# Patient Record
Sex: FEMALE | Race: WHITE | Employment: OTHER | URBAN - METROPOLITAN AREA
[De-identification: names, ages, dates, MRNs, and addresses within clinical notes are randomized per-mention and may not be internally consistent; named-entity substitution may affect disease eponyms.]

---

## 2019-03-18 ENCOUNTER — HOSPITAL ENCOUNTER (EMERGENCY)
Age: 69
Discharge: HOME | End: 2019-03-19
Attending: EMERGENCY MEDICINE | Admitting: EMERGENCY MEDICINE
Payer: COMMERCIAL

## 2019-03-18 DIAGNOSIS — M79.18 PAIN OF LEFT DELTOID: Primary | ICD-10-CM

## 2019-03-18 DIAGNOSIS — M79.10 MYALGIA: ICD-10-CM

## 2019-03-18 PROCEDURE — 99284 EMERGENCY DEPT VISIT MOD MDM: CPT | Performed by: EMERGENCY MEDICINE

## 2019-03-18 PROCEDURE — 74011636637 HC RX REV CODE- 636/637: Performed by: EMERGENCY MEDICINE

## 2019-03-18 PROCEDURE — 74011250637 HC RX REV CODE- 250/637: Performed by: EMERGENCY MEDICINE

## 2019-03-18 RX ORDER — PREDNISONE 20 MG/1
40 TABLET ORAL
Status: COMPLETED | OUTPATIENT
Start: 2019-03-18 | End: 2019-03-18

## 2019-03-18 RX ORDER — METHYLPREDNISOLONE 4 MG/1
4 TABLET ORAL
Qty: 1 DOSE PACK | Refills: 0 | Status: SHIPPED | OUTPATIENT
Start: 2019-03-18 | End: 2019-04-23

## 2019-03-18 RX ORDER — METOPROLOL SUCCINATE 25 MG/1
25 TABLET, EXTENDED RELEASE ORAL DAILY
COMMUNITY

## 2019-03-18 RX ORDER — HYDROCODONE BITARTRATE AND ACETAMINOPHEN 7.5; 325 MG/1; MG/1
1 TABLET ORAL
Status: COMPLETED | OUTPATIENT
Start: 2019-03-18 | End: 2019-03-18

## 2019-03-18 RX ORDER — IBUPROFEN 800 MG/1
800 TABLET ORAL
Status: COMPLETED | OUTPATIENT
Start: 2019-03-18 | End: 2019-03-18

## 2019-03-18 RX ADMIN — IBUPROFEN 800 MG: 800 TABLET, FILM COATED ORAL at 23:14

## 2019-03-18 RX ADMIN — PREDNISONE 40 MG: 20 TABLET ORAL at 23:14

## 2019-03-18 RX ADMIN — HYDROCODONE BITARTRATE AND ACETAMINOPHEN 1 TABLET: 7.5; 325 TABLET ORAL at 23:14

## 2019-03-19 VITALS
HEIGHT: 68 IN | DIASTOLIC BLOOD PRESSURE: 64 MMHG | WEIGHT: 155 LBS | SYSTOLIC BLOOD PRESSURE: 135 MMHG | TEMPERATURE: 98.5 F | OXYGEN SATURATION: 99 % | HEART RATE: 90 BPM | RESPIRATION RATE: 16 BRPM | BODY MASS INDEX: 23.49 KG/M2

## 2019-03-19 NOTE — ED NOTES
I have reviewed discharge instructions with the patient. The patient verbalized understanding. Patient left ED via Discharge Method: ambulatory to Home with daughter. Opportunity for questions and clarification provided. Patient given 1 scripts. To continue your aftercare when you leave the hospital, you may receive an automated call from our care team to check in on how you are doing. This is a free service and part of our promise to provide the best care and service to meet your aftercare needs.  If you have questions, or wish to unsubscribe from this service please call [phone]. Thank you for Choosing our [facility] Emergency Department.

## 2019-03-19 NOTE — DISCHARGE INSTRUCTIONS
1) Use sling for comfort, begin early range of motion exercises as we discussed as soon as possible  2) Use ibuprofen and tylenol for pain  3) Take medrol dose radha for inflammation  4) Take zyrtec as well for non-sedating antihistamine

## 2019-04-23 ENCOUNTER — APPOINTMENT (OUTPATIENT)
Dept: GENERAL RADIOLOGY | Age: 69
End: 2019-04-23
Attending: EMERGENCY MEDICINE
Payer: COMMERCIAL

## 2019-04-23 ENCOUNTER — HOSPITAL ENCOUNTER (EMERGENCY)
Age: 69
Discharge: HOME | End: 2019-04-23
Attending: EMERGENCY MEDICINE
Payer: COMMERCIAL

## 2019-04-23 VITALS
TEMPERATURE: 98.3 F | BODY MASS INDEX: 23.49 KG/M2 | WEIGHT: 155 LBS | HEIGHT: 68 IN | RESPIRATION RATE: 16 BRPM | DIASTOLIC BLOOD PRESSURE: 66 MMHG | OXYGEN SATURATION: 100 % | SYSTOLIC BLOOD PRESSURE: 139 MMHG | HEART RATE: 71 BPM

## 2019-04-23 DIAGNOSIS — F41.1 ANXIETY STATE: Primary | ICD-10-CM

## 2019-04-23 LAB
ALBUMIN SERPL-MCNC: 4 G/DL (ref 3.2–4.6)
ALBUMIN/GLOB SERPL: 1.2 {RATIO}
ALP SERPL-CCNC: 96 U/L (ref 50–130)
ALT SERPL-CCNC: 28 U/L (ref 12–65)
ANION GAP SERPL CALC-SCNC: 8 MMOL/L
AST SERPL-CCNC: 25 U/L (ref 15–37)
ATRIAL RATE: 92 BPM
BASOPHILS # BLD: 0 K/UL (ref 0–0.2)
BASOPHILS NFR BLD: 1 % (ref 0–2)
BILIRUB SERPL-MCNC: 0.6 MG/DL (ref 0.2–1.1)
BUN SERPL-MCNC: 15 MG/DL (ref 8–23)
CALCIUM SERPL-MCNC: 9.8 MG/DL (ref 8.3–10.4)
CALCULATED P AXIS, ECG09: 50 DEGREES
CALCULATED R AXIS, ECG10: 67 DEGREES
CALCULATED T AXIS, ECG11: 41 DEGREES
CHLORIDE SERPL-SCNC: 105 MMOL/L (ref 98–107)
CO2 SERPL-SCNC: 27 MMOL/L (ref 21–32)
CREAT SERPL-MCNC: 1.07 MG/DL (ref 0.6–1)
DIAGNOSIS, 93000: NORMAL
DIFFERENTIAL METHOD BLD: NORMAL
EOSINOPHIL # BLD: 0.2 K/UL (ref 0–0.8)
EOSINOPHIL NFR BLD: 3 % (ref 0.5–7.8)
ERYTHROCYTE [DISTWIDTH] IN BLOOD BY AUTOMATED COUNT: 12.4 % (ref 11.9–14.6)
GLOBULIN SER CALC-MCNC: 3.3 G/DL (ref 2.3–3.5)
GLUCOSE SERPL-MCNC: 133 MG/DL (ref 65–100)
HCT VFR BLD AUTO: 41.5 % (ref 35.8–46.3)
HGB BLD-MCNC: 13.6 G/DL (ref 11.7–15.4)
IMM GRANULOCYTES # BLD AUTO: 0 K/UL (ref 0–0.5)
IMM GRANULOCYTES NFR BLD AUTO: 0 % (ref 0–5)
LYMPHOCYTES # BLD: 1.7 K/UL (ref 0.5–4.6)
LYMPHOCYTES NFR BLD: 32 % (ref 13–44)
MCH RBC QN AUTO: 28.6 PG (ref 26.1–32.9)
MCHC RBC AUTO-ENTMCNC: 32.8 G/DL (ref 31.4–35)
MCV RBC AUTO: 87.4 FL (ref 79.6–97.8)
MONOCYTES # BLD: 0.4 K/UL (ref 0.1–1.3)
MONOCYTES NFR BLD: 8 % (ref 4–12)
NEUTS SEG # BLD: 3.1 K/UL (ref 1.7–8.2)
NEUTS SEG NFR BLD: 56 % (ref 43–78)
NRBC # BLD: 0 K/UL (ref 0–0.2)
P-R INTERVAL, ECG05: 152 MS
PLATELET # BLD AUTO: 226 K/UL (ref 150–450)
PMV BLD AUTO: 10.1 FL (ref 9.4–12.3)
POTASSIUM SERPL-SCNC: 3.3 MMOL/L (ref 3.5–5.1)
PROT SERPL-MCNC: 7.3 G/DL
Q-T INTERVAL, ECG07: 358 MS
QRS DURATION, ECG06: 88 MS
QTC CALCULATION (BEZET), ECG08: 442 MS
RBC # BLD AUTO: 4.75 M/UL (ref 4.05–5.2)
SODIUM SERPL-SCNC: 140 MMOL/L (ref 136–145)
TROPONIN I BLD-MCNC: 0 NG/ML (ref 0.02–0.05)
VENTRICULAR RATE, ECG03: 92 BPM
WBC # BLD AUTO: 5.4 K/UL (ref 4.3–11.1)

## 2019-04-23 PROCEDURE — 93005 ELECTROCARDIOGRAM TRACING: CPT | Performed by: EMERGENCY MEDICINE

## 2019-04-23 PROCEDURE — 99284 EMERGENCY DEPT VISIT MOD MDM: CPT | Performed by: EMERGENCY MEDICINE

## 2019-04-23 PROCEDURE — 74011250636 HC RX REV CODE- 250/636: Performed by: EMERGENCY MEDICINE

## 2019-04-23 PROCEDURE — 85025 COMPLETE CBC W/AUTO DIFF WBC: CPT

## 2019-04-23 PROCEDURE — 96374 THER/PROPH/DIAG INJ IV PUSH: CPT | Performed by: EMERGENCY MEDICINE

## 2019-04-23 PROCEDURE — 84484 ASSAY OF TROPONIN QUANT: CPT

## 2019-04-23 PROCEDURE — 80053 COMPREHEN METABOLIC PANEL: CPT

## 2019-04-23 PROCEDURE — 71045 X-RAY EXAM CHEST 1 VIEW: CPT

## 2019-04-23 RX ORDER — ONDANSETRON 2 MG/ML
4 INJECTION INTRAMUSCULAR; INTRAVENOUS
Status: COMPLETED | OUTPATIENT
Start: 2019-04-23 | End: 2019-04-23

## 2019-04-23 RX ADMIN — ONDANSETRON 4 MG: 2 INJECTION INTRAMUSCULAR; INTRAVENOUS at 21:40

## 2019-04-24 NOTE — ED PROVIDER NOTES
Patient is a 65-year-old female with a history of anxiety who was cleaning in her kitchen around 5 PM tonight and she had the acute onset of some nausea, some shortness of breath, and felt uneasy and anxious. She also had a little fluttering. There was no syncope. No chest pain. She feels much better now. She noticed her blood pressure was high and she checked it several more times and it was persistently elevated so she got nervous. The history is provided by the patient. Shortness of Breath This is a new problem. The current episode started 3 to 5 hours ago. The problem has been resolved. Pertinent negatives include no fever, no headaches, no rhinorrhea, no sore throat, no neck pain, no cough, no wheezing, no chest pain, no syncope, no vomiting, no abdominal pain, no rash, no leg pain and no leg swelling. She has tried nothing for the symptoms. Associated medical issues do not include asthma, COPD, CAD or past MI. Past Medical History:  
Diagnosis Date  Ill-defined condition History reviewed. No pertinent surgical history. History reviewed. No pertinent family history. Social History Socioeconomic History  Marital status:  Spouse name: Not on file  Number of children: Not on file  Years of education: Not on file  Highest education level: Not on file Occupational History  Not on file Social Needs  Financial resource strain: Not on file  Food insecurity:  
  Worry: Not on file Inability: Not on file  Transportation needs:  
  Medical: Not on file Non-medical: Not on file Tobacco Use  Smoking status: Not on file Substance and Sexual Activity  Alcohol use: Not on file  Drug use: Not on file  Sexual activity: Not on file Lifestyle  Physical activity:  
  Days per week: Not on file Minutes per session: Not on file  Stress: Not on file Relationships  Social connections: Talks on phone: Not on file Gets together: Not on file Attends Sikhism service: Not on file Active member of club or organization: Not on file Attends meetings of clubs or organizations: Not on file Relationship status: Not on file  Intimate partner violence:  
  Fear of current or ex partner: Not on file Emotionally abused: Not on file Physically abused: Not on file Forced sexual activity: Not on file Other Topics Concern  Not on file Social History Narrative  Not on file ALLERGIES: Ciprofloxacin and Erythromycin Review of Systems Constitutional: Negative for chills, fatigue and fever. HENT: Negative for congestion, rhinorrhea and sore throat. Eyes: Negative for pain, discharge and visual disturbance. Respiratory: Positive for shortness of breath. Negative for cough and wheezing. Cardiovascular: Positive for palpitations. Negative for chest pain, leg swelling and syncope. Gastrointestinal: Positive for nausea. Negative for abdominal pain, diarrhea and vomiting. Endocrine: Negative for polydipsia and polyuria. Genitourinary: Negative for dysuria, frequency and urgency. Musculoskeletal: Negative for back pain and neck pain. Skin: Negative for rash. Neurological: Negative for seizures, syncope, weakness and headaches. Hematological: Negative. Vitals:  
 04/23/19 1930 BP: 177/80 Pulse: (!) 101 Resp: 18 Temp: 98 °F (36.7 °C) SpO2: 100% Weight: 70.3 kg (155 lb) Height: 5' 8\" (1.727 m) Physical Exam  
Constitutional: She is oriented to person, place, and time. She appears well-developed and well-nourished. HENT:  
Head: Normocephalic and atraumatic. Eyes: Pupils are equal, round, and reactive to light. Conjunctivae and EOM are normal.  
Neck: Normal range of motion. Neck supple. Cardiovascular: Normal rate, regular rhythm and intact distal pulses. Pulmonary/Chest: Effort normal and breath sounds normal.  
Abdominal: Soft. There is no tenderness. There is no rebound and no guarding. Musculoskeletal: Normal range of motion. She exhibits no edema or deformity. Lymphadenopathy:  
  She has no cervical adenopathy. Neurological: She is alert and oriented to person, place, and time. She has normal strength. No cranial nerve deficit or sensory deficit. GCS eye subscore is 4. GCS verbal subscore is 5. GCS motor subscore is 6. Skin: Skin is warm and dry. No rash noted. Nursing note and vitals reviewed. MDM Number of Diagnoses or Management Options Diagnosis management comments: Blood pressures trended down and is now 732 systolic. EKG reviewed by me normal sinus rhythm rate of 92 left ventricular block with no acute ischemia Blood work unremarkable Troponin 0 Chest x-ray clear Evaluation here is all negative patient does have a history of anxiety in the past.  This was likely the culprit tonight. Advised follow-up with her doctor or return for any other acute concerns. Voice dictation software was used during the making of this note. This software is not perfect and grammatical and other typographical errors may be present. This note has been proofread, but may still contain errors. Vernon Armenta MD; 4/23/2019 @9:15 PM  
=================================================================== Amount and/or Complexity of Data Reviewed Clinical lab tests: ordered and reviewed Tests in the radiology section of CPT®: ordered and reviewed Review and summarize past medical records: yes Independent visualization of images, tracings, or specimens: yes Risk of Complications, Morbidity, and/or Mortality Presenting problems: low Management options: low Patient Progress Patient progress: stable Procedures

## 2019-04-24 NOTE — ED NOTES
I have reviewed discharge instructions with the patient. The patient verbalized understanding. Patient left ED via Discharge Method: ambulatory to Home with daughter. Opportunity for questions and clarification provided. Patient given 0 scripts. To continue your aftercare when you leave the hospital, you may receive an automated call from our care team to check in on how you are doing. This is a free service and part of our promise to provide the best care and service to meet your aftercare needs.  If you have questions, or wish to unsubscribe from this service please call [phone]. Thank you for Choosing our [facility] Emergency Department.

## 2019-05-30 NOTE — ED PROVIDER NOTES
Sebastián Ingram Setting is a 81 y.o. female seen on 3/18/2019 at 11:01 PM in the [facility] EMERGENCY DEPT in room FT12/12. Chief Complaint   Patient presents with    Arm Pain       HPI:  24-year-old female presented to the emergency department for evaluation of left shoulder pain following a pneumonia vaccination. Today around 10 AM at her primary care doctor's office she received pneumonia vaccine. She states that since receiving this and her left shoulder she has had increasing pain in her left shoulder. Pain is worse with any movement of her left arm. The pain does not radiate she describes it as a significant aching sensation it's worse right at this site where the shot was given. She is also complaining of some low grade fever and myalgias. No numbness or tingling or weakness in extremities    Historian: patient    REVIEW OF SYSTEMS     Review of Systems   Constitutional: Negative for fever. HENT: Negative. Eyes: Negative. Respiratory: Negative for cough, chest tightness, shortness of breath and wheezing. Cardiovascular: Negative for chest pain. Gastrointestinal: Negative for abdominal distention, abdominal pain, constipation, diarrhea and vomiting. Endocrine: Negative. Genitourinary: Negative for dysuria, flank pain, frequency and urgency. Musculoskeletal: Positive for arthralgias and myalgias. Neurological: Negative for dizziness, syncope and headaches. Psychiatric/Behavioral: Negative. All other systems reviewed and are negative. PAST MEDICAL HISTORY     Past Medical History:   Diagnosis Date    Ill-defined condition      History reviewed. No pertinent surgical history.   Social History     Socioeconomic History    Marital status:      Spouse name: Not on file    Number of children: Not on file    Years of education: Not on file    Highest education level: Not on file     Prior to Admission Medications Prescriptions Last Dose Informant Patient Reported? Taking?   metoprolol succinate (TOPROL-XL) 25 mg XL tablet   Yes Yes   Sig: Take 25 mg by mouth daily. Facility-Administered Medications: None     Allergies   Allergen Reactions    Ciprofloxacin Rash    Erythromycin Diarrhea        PHYSICAL EXAM       Vitals:    03/18/19 2144 03/18/19 2156 03/18/19 2227 03/18/19 2228   BP: 128/76 128/68  137/65   Pulse: 94  (!) 101 (!) 104   Resp: 18   18   Temp: 98.4 °F (36.9 °C)   100.3 °F (37.9 °C)   SpO2: 99%  95% 98%    Vital signs were reviewed. Physical Exam   Constitutional: She is oriented to person, place, and time. She appears well-developed and well-nourished. No distress. HENT:   Head: Normocephalic and atraumatic. Eyes: EOM are normal. Pupils are equal, round, and reactive to light. Neck: Normal range of motion. Neck supple. No midline tenderness, no step-offs or deformities, no meningismus   Cardiovascular: Normal rate, regular rhythm, normal heart sounds and intact distal pulses. Exam reveals no gallop and no friction rub. No murmur heard. Pulmonary/Chest: Effort normal and breath sounds normal. No stridor. No respiratory distress. She has no wheezes. Abdominal: Soft. Bowel sounds are normal. She exhibits no distension and no mass. There is no tenderness. There is no rebound and no guarding. Musculoskeletal: She exhibits tenderness. She exhibits no edema or deformity.    The left shoulder has exquisite tenderness to palpation over the deltoid muscle, significant discomfort with range of motion of the left shoulder, there is no tenderness over the biceps muscle and triceps, no tenderness at the elbow, full range of motion at the elbow and wrist without any discomfort, sensation is intact in all 3 distributions in the left hand, good capillary refill, 2+ radial and ulnar pulses are palpable, normal strength in all 3 distributions of the bautista, clavicles are nontender, AC joints are nontender, there is no overlying erythema, edema, or increased warmth over the shoulder   Neurological: She is alert and oriented to person, place, and time. No sensory deficit. No focal neuro deficits   Skin: Skin is warm and dry. No rash noted. She is not diaphoretic. No erythema. No erythema or increased warmth   Psychiatric: She has a normal mood and affect. Her behavior is normal.   Vitals reviewed. MEDICAL DECISION MAKING       ED Course: The patient has focal localized tenderness over the deltoid muscle at the site of the injection. I think this most likely represents a significant inflammatory response to the vaccination. No evidence of septic arthritis, cellulitis, abscess, etc. OVerall pt is well appearing, HDS. Given the degree of her discomfort, I will place a sling for short-term immobilization and give steroids for inflammatory control. I have recommended early range of motion exercises. She is comfortable with this plan and understands reasons to return to the emergency department. Disposition:  Dc to home  Diagnosis:  Deltoid pain    ____________________________________________________________________  A portion of this note was generated using voice recognition dictation software. While the note has been reviewed for accuracy, please note certain words and phrases may not be transcribed as intended that some grammatical and/or typographical errors may be present. Eye Protection Verbiage: Before proceeding with the stage, a plastic scleral shield was inserted. The globe was anesthetized with a few drops of 1% lidocaine with 1:100,000 epinephrine. Then, an appropriate sized scleral shield was chosen and coated with lacrilube ointment. The shield was gently inserted and left in place for the duration of each stage. After the stage was completed, the shield was gently removed.

## 2019-10-27 ENCOUNTER — HOSPITAL ENCOUNTER (EMERGENCY)
Age: 69
Discharge: HOME | End: 2019-10-27
Attending: EMERGENCY MEDICINE
Payer: COMMERCIAL

## 2019-10-27 ENCOUNTER — APPOINTMENT (OUTPATIENT)
Dept: GENERAL RADIOLOGY | Age: 69
End: 2019-10-27
Attending: EMERGENCY MEDICINE
Payer: COMMERCIAL

## 2019-10-27 VITALS
OXYGEN SATURATION: 96 % | BODY MASS INDEX: 22.13 KG/M2 | TEMPERATURE: 98.3 F | WEIGHT: 146 LBS | DIASTOLIC BLOOD PRESSURE: 67 MMHG | RESPIRATION RATE: 19 BRPM | HEIGHT: 68 IN | HEART RATE: 77 BPM | SYSTOLIC BLOOD PRESSURE: 101 MMHG

## 2019-10-27 DIAGNOSIS — R07.9 CHEST PAIN, UNSPECIFIED TYPE: Primary | ICD-10-CM

## 2019-10-27 LAB
ALBUMIN SERPL-MCNC: 4.1 G/DL (ref 3.2–4.6)
ALBUMIN/GLOB SERPL: 1.1 {RATIO} (ref 1.2–3.5)
ALP SERPL-CCNC: 75 U/L (ref 50–136)
ALT SERPL-CCNC: 20 U/L (ref 12–65)
ANION GAP SERPL CALC-SCNC: 9 MMOL/L (ref 7–16)
AST SERPL-CCNC: 24 U/L (ref 15–37)
BASOPHILS # BLD: 0 K/UL (ref 0–0.2)
BASOPHILS NFR BLD: 1 % (ref 0–2)
BILIRUB SERPL-MCNC: 0.8 MG/DL (ref 0.2–1.1)
BNP SERPL-MCNC: 25 PG/ML
BUN SERPL-MCNC: 15 MG/DL (ref 8–23)
CALCIUM SERPL-MCNC: 9.2 MG/DL (ref 8.3–10.4)
CHLORIDE SERPL-SCNC: 104 MMOL/L (ref 98–107)
CO2 SERPL-SCNC: 25 MMOL/L (ref 21–32)
CREAT SERPL-MCNC: 1.15 MG/DL (ref 0.6–1)
D DIMER PPP FEU-MCNC: <0.27 UG/ML(FEU)
DIFFERENTIAL METHOD BLD: ABNORMAL
EOSINOPHIL # BLD: 0 K/UL (ref 0–0.8)
EOSINOPHIL NFR BLD: 0 % (ref 0.5–7.8)
ERYTHROCYTE [DISTWIDTH] IN BLOOD BY AUTOMATED COUNT: 12.5 % (ref 11.9–14.6)
GLOBULIN SER CALC-MCNC: 3.8 G/DL (ref 2.3–3.5)
GLUCOSE SERPL-MCNC: 107 MG/DL (ref 65–100)
HCT VFR BLD AUTO: 41.5 % (ref 35.8–46.3)
HGB BLD-MCNC: 14.1 G/DL (ref 11.7–15.4)
IMM GRANULOCYTES # BLD AUTO: 0 K/UL (ref 0–0.5)
IMM GRANULOCYTES NFR BLD AUTO: 0 % (ref 0–5)
LYMPHOCYTES # BLD: 1.2 K/UL (ref 0.5–4.6)
LYMPHOCYTES NFR BLD: 14 % (ref 13–44)
MCH RBC QN AUTO: 29.6 PG (ref 26.1–32.9)
MCHC RBC AUTO-ENTMCNC: 34 G/DL (ref 31.4–35)
MCV RBC AUTO: 87 FL (ref 79.6–97.8)
MONOCYTES # BLD: 0.5 K/UL (ref 0.1–1.3)
MONOCYTES NFR BLD: 6 % (ref 4–12)
NEUTS SEG # BLD: 7 K/UL (ref 1.7–8.2)
NEUTS SEG NFR BLD: 80 % (ref 43–78)
NRBC # BLD: 0 K/UL (ref 0–0.2)
PLATELET # BLD AUTO: 272 K/UL (ref 150–450)
PMV BLD AUTO: 10.3 FL (ref 9.4–12.3)
POTASSIUM SERPL-SCNC: 3.2 MMOL/L (ref 3.5–5.1)
PROT SERPL-MCNC: 7.9 G/DL (ref 6.3–8.2)
RBC # BLD AUTO: 4.77 M/UL (ref 4.05–5.2)
SODIUM SERPL-SCNC: 138 MMOL/L (ref 136–145)
TROPONIN I BLD-MCNC: 0 NG/ML (ref 0.02–0.05)
TROPONIN I SERPL-MCNC: <0.02 NG/ML (ref 0.02–0.05)
WBC # BLD AUTO: 8.8 K/UL (ref 4.3–11.1)

## 2019-10-27 PROCEDURE — 84484 ASSAY OF TROPONIN QUANT: CPT

## 2019-10-27 PROCEDURE — 80053 COMPREHEN METABOLIC PANEL: CPT

## 2019-10-27 PROCEDURE — 83880 ASSAY OF NATRIURETIC PEPTIDE: CPT

## 2019-10-27 PROCEDURE — 85379 FIBRIN DEGRADATION QUANT: CPT

## 2019-10-27 PROCEDURE — 74011250637 HC RX REV CODE- 250/637: Performed by: EMERGENCY MEDICINE

## 2019-10-27 PROCEDURE — 99285 EMERGENCY DEPT VISIT HI MDM: CPT | Performed by: EMERGENCY MEDICINE

## 2019-10-27 PROCEDURE — 74011250636 HC RX REV CODE- 250/636: Performed by: EMERGENCY MEDICINE

## 2019-10-27 PROCEDURE — 93005 ELECTROCARDIOGRAM TRACING: CPT | Performed by: EMERGENCY MEDICINE

## 2019-10-27 PROCEDURE — 74011000250 HC RX REV CODE- 250: Performed by: EMERGENCY MEDICINE

## 2019-10-27 PROCEDURE — 96375 TX/PRO/DX INJ NEW DRUG ADDON: CPT | Performed by: EMERGENCY MEDICINE

## 2019-10-27 PROCEDURE — 94640 AIRWAY INHALATION TREATMENT: CPT

## 2019-10-27 PROCEDURE — 71046 X-RAY EXAM CHEST 2 VIEWS: CPT

## 2019-10-27 PROCEDURE — 85025 COMPLETE CBC W/AUTO DIFF WBC: CPT

## 2019-10-27 PROCEDURE — 96374 THER/PROPH/DIAG INJ IV PUSH: CPT | Performed by: EMERGENCY MEDICINE

## 2019-10-27 RX ORDER — LORAZEPAM 2 MG/ML
0.5 INJECTION INTRAMUSCULAR
Status: COMPLETED | OUTPATIENT
Start: 2019-10-27 | End: 2019-10-27

## 2019-10-27 RX ORDER — GUAIFENESIN 100 MG/5ML
162 LIQUID (ML) ORAL
Status: COMPLETED | OUTPATIENT
Start: 2019-10-27 | End: 2019-10-27

## 2019-10-27 RX ORDER — IPRATROPIUM BROMIDE AND ALBUTEROL SULFATE 2.5; .5 MG/3ML; MG/3ML
3 SOLUTION RESPIRATORY (INHALATION)
Status: COMPLETED | OUTPATIENT
Start: 2019-10-27 | End: 2019-10-27

## 2019-10-27 RX ORDER — NITROGLYCERIN 0.4 MG/1
0.4 TABLET SUBLINGUAL
Status: DISCONTINUED | OUTPATIENT
Start: 2019-10-27 | End: 2019-10-27 | Stop reason: HOSPADM

## 2019-10-27 RX ORDER — METOPROLOL TARTRATE 5 MG/5ML
5 INJECTION INTRAVENOUS ONCE
Status: COMPLETED | OUTPATIENT
Start: 2019-10-27 | End: 2019-10-27

## 2019-10-27 RX ADMIN — METOPROLOL TARTRATE 5 MG: 5 INJECTION INTRAVENOUS at 19:11

## 2019-10-27 RX ADMIN — ASPIRIN 81 MG 162 MG: 81 TABLET ORAL at 18:32

## 2019-10-27 RX ADMIN — IPRATROPIUM BROMIDE AND ALBUTEROL SULFATE 3 ML: .5; 3 SOLUTION RESPIRATORY (INHALATION) at 18:15

## 2019-10-27 RX ADMIN — NITROGLYCERIN 0.4 MG: 0.4 TABLET SUBLINGUAL at 18:38

## 2019-10-27 RX ADMIN — LORAZEPAM 0.5 MG: 2 INJECTION INTRAMUSCULAR; INTRAVENOUS at 18:50

## 2019-10-27 NOTE — ED PROVIDER NOTES
73-year-old female presents with a one-week history of intermittent dyspnea and chest fullness. She sometimes feels that the pressure sensation in the center of her chest radiates up to the throat. It is not exertional or positional.  There is no concomitant nausea or diaphoresis. She sometimes feels a little dizzy but does not experience paresthesias of the face, feet, or hands. Symptoms come and go and may last 15 to 30 minutes at a time. Dyspnea is bad enough that she needs to stop doing what she is doing. No PE risk factors specifically no recent travel, surgeries, hormone replacement. No prior history of DVT or PE. Patient takes metoprolol for palpitations and is actually due for 1-1/2 of her 25 mg metoprolol XL's now. Past Medical History:   Diagnosis Date    Ill-defined condition        History reviewed. No pertinent surgical history. History reviewed. No pertinent family history.     Social History     Socioeconomic History    Marital status:      Spouse name: Not on file    Number of children: Not on file    Years of education: Not on file    Highest education level: Not on file   Occupational History    Not on file   Social Needs    Financial resource strain: Not on file    Food insecurity:     Worry: Not on file     Inability: Not on file    Transportation needs:     Medical: Not on file     Non-medical: Not on file   Tobacco Use    Smoking status: Not on file   Substance and Sexual Activity    Alcohol use: Not on file    Drug use: Not on file    Sexual activity: Not on file   Lifestyle    Physical activity:     Days per week: Not on file     Minutes per session: Not on file    Stress: Not on file   Relationships    Social connections:     Talks on phone: Not on file     Gets together: Not on file     Attends Faith service: Not on file     Active member of club or organization: Not on file     Attends meetings of clubs or organizations: Not on file Relationship status: Not on file    Intimate partner violence:     Fear of current or ex partner: Not on file     Emotionally abused: Not on file     Physically abused: Not on file     Forced sexual activity: Not on file   Other Topics Concern    Not on file   Social History Narrative    Not on file         ALLERGIES: Ciprofloxacin and Erythromycin    Review of Systems   Constitutional: Negative for chills and fever. HENT: Negative for rhinorrhea and sore throat. Eyes: Negative for discharge and redness. Respiratory: Positive for shortness of breath. Negative for cough. Cardiovascular: Positive for chest pain and palpitations. Negative for leg swelling. Gastrointestinal: Negative for abdominal pain, diarrhea, nausea and vomiting. Musculoskeletal: Negative for arthralgias and back pain. Skin: Negative for rash. Neurological: Negative for dizziness and headaches. All other systems reviewed and are negative. Vitals:    10/27/19 1614 10/27/19 1816   BP: 140/74    Pulse: 98    Resp: 16    Temp: 98.3 °F (36.8 °C)    SpO2: 100% 98%   Weight: 66.2 kg (146 lb)    Height: 5' 8\" (1.727 m)             Physical Exam   Constitutional: She is oriented to person, place, and time. She appears well-developed and well-nourished. No distress. HENT:   Head: Normocephalic and atraumatic. Eyes: Pupils are equal, round, and reactive to light. Conjunctivae are normal. Right eye exhibits no discharge. Left eye exhibits no discharge. No scleral icterus. Neck: Normal range of motion. Neck supple. Cardiovascular: Normal rate, regular rhythm and normal heart sounds. Exam reveals no gallop. No murmur heard. Pulmonary/Chest: Effort normal and breath sounds normal. No respiratory distress. She has no wheezes. She has no rales. Abdominal: Soft. Bowel sounds are normal. There is no tenderness. There is no guarding. Musculoskeletal: Normal range of motion. She exhibits no edema.    Neurological: She is alert and oriented to person, place, and time. She exhibits normal muscle tone. cni 2-12 grossly   Skin: Skin is warm and dry. She is not diaphoretic. Psychiatric: She has a normal mood and affect. Her behavior is normal.   Nursing note and vitals reviewed. MDM  Number of Diagnoses or Management Options  Diagnosis management comments: Medical decision making note:  53-year-old female with intermittent chest fullness and dyspnea. She shows ischemic changes with ST depression inferolaterally. No improvement with a DuoNeb which was ordered empirically for the dyspnea, fact patient now states she feels like her head is \"buzzing\". When EKG is repeated with consistent inferolateral ST depression. Troponin is negative, and d-dimer and BNP have been added on. Cardiology is asked to see the patient, in light of the EKG changes with intermittent chest fullness and dyspnea    6:45 PM apparently, RN went to give a single nitroglycerin, and 5 of Lopressor IV, and patient's heart rate spiked up from the 110s to the 140s. seems to be overtly anxious now. She states had trouble with anxiety in the past, and used to take Xanax intermittently. We will do a third EKG with the new tachycardia  feels better after 0.5mg iv ativan, and st depression persists. No relief with 1 ntg, s.l. Care turned over to dr bradley at 19:000 on bedside rounds with patient and , opportunities to ask questions provided. Cardiology consult, d-dimer, and bnp pending. If discharged should get a p.o.c. Troponin at 19:19    This concludes the \"medical decision making note\" part of this emergency department visit note. Tomas Bradley.   ALONDRA troponin negative. D-dimer and BNP are good. Cardiology saw EKGs and state no change from previous in April and okay to follow-up. Patient feeling better after Ativan here. Will discharge with follow-up.          Procedures

## 2019-10-27 NOTE — ED TRIAGE NOTES
Pt ambulatory to triage without complications. Pt reports intermittent SOB for few days. Pt states she has productive cough with clear sputum and states it feels like a \"lump\" in her throat. Pt reports hx of palpitations. Pt denies any further cardiac hx. Pt denies hx of COPD or asthma. Pt denies cp, jaw pain, neck pain, shoulder pain, or abd pain. Pt denies coughing up any blood. Pt denies taking anitcoags. Pt denies hx of PE or DVT or long car rides or plane rides lately.

## 2019-10-27 NOTE — ED NOTES
Pt explained the side effects of nitro. When pt started to experience the headache, pt became panicked. Pt states she is prone to anxiety attacks and worries about migraines. Pt HR gone up to 140 Dr. Milagros Woo informed, and meds given.

## 2019-10-28 LAB
ATRIAL RATE: 100 BPM
ATRIAL RATE: 106 BPM
ATRIAL RATE: 136 BPM
ATRIAL RATE: 98 BPM
CALCULATED P AXIS, ECG09: 74 DEGREES
CALCULATED P AXIS, ECG09: 77 DEGREES
CALCULATED P AXIS, ECG09: 79 DEGREES
CALCULATED P AXIS, ECG09: 80 DEGREES
CALCULATED R AXIS, ECG10: 72 DEGREES
CALCULATED R AXIS, ECG10: 73 DEGREES
CALCULATED R AXIS, ECG10: 76 DEGREES
CALCULATED R AXIS, ECG10: 78 DEGREES
CALCULATED T AXIS, ECG11: -55 DEGREES
CALCULATED T AXIS, ECG11: 25 DEGREES
CALCULATED T AXIS, ECG11: 41 DEGREES
CALCULATED T AXIS, ECG11: 51 DEGREES
DIAGNOSIS, 93000: NORMAL
P-R INTERVAL, ECG05: 146 MS
P-R INTERVAL, ECG05: 154 MS
P-R INTERVAL, ECG05: 158 MS
P-R INTERVAL, ECG05: 196 MS
Q-T INTERVAL, ECG07: 284 MS
Q-T INTERVAL, ECG07: 336 MS
Q-T INTERVAL, ECG07: 356 MS
Q-T INTERVAL, ECG07: 362 MS
QRS DURATION, ECG06: 76 MS
QRS DURATION, ECG06: 80 MS
QRS DURATION, ECG06: 82 MS
QRS DURATION, ECG06: 86 MS
QTC CALCULATION (BEZET), ECG08: 427 MS
QTC CALCULATION (BEZET), ECG08: 446 MS
QTC CALCULATION (BEZET), ECG08: 459 MS
QTC CALCULATION (BEZET), ECG08: 462 MS
VENTRICULAR RATE, ECG03: 100 BPM
VENTRICULAR RATE, ECG03: 106 BPM
VENTRICULAR RATE, ECG03: 136 BPM
VENTRICULAR RATE, ECG03: 98 BPM

## 2019-10-28 NOTE — DISCHARGE INSTRUCTIONS

## 2019-10-28 NOTE — ED NOTES
I have reviewed discharge instructions with the patient. The patient verbalized understanding. Patient left ED via Discharge Method: ambulatory to Home with family. Opportunity for questions and clarification provided. Patient given 0 scripts. To continue your aftercare when you leave the hospital, you may receive an automated call from our care team to check in on how you are doing. This is a free service and part of our promise to provide the best care and service to meet your aftercare needs.  If you have questions, or wish to unsubscribe from this service please call [phone]. Thank you for Choosing our [facility] Emergency Department.

## 2020-01-21 ENCOUNTER — HOSPITAL ENCOUNTER (OUTPATIENT)
Dept: MAMMOGRAPHY | Age: 70
Discharge: HOME | End: 2020-01-21
Attending: NURSE PRACTITIONER
Payer: COMMERCIAL

## 2020-01-21 DIAGNOSIS — Z12.31 VISIT FOR SCREENING MAMMOGRAM: ICD-10-CM

## 2020-01-21 PROCEDURE — 77067 SCR MAMMO BI INCL CAD: CPT

## 2020-03-15 ENCOUNTER — HOSPITAL ENCOUNTER (OUTPATIENT)
Age: 70
Setting detail: OBSERVATION
Discharge: HOME | End: 2020-03-16
Attending: EMERGENCY MEDICINE | Admitting: INTERNAL MEDICINE
Payer: COMMERCIAL

## 2020-03-15 ENCOUNTER — APPOINTMENT (OUTPATIENT)
Dept: CT IMAGING | Age: 70
End: 2020-03-15
Attending: EMERGENCY MEDICINE
Payer: COMMERCIAL

## 2020-03-15 DIAGNOSIS — R20.0 NUMBNESS: Primary | ICD-10-CM

## 2020-03-15 PROBLEM — R00.2 PALPITATIONS: Status: ACTIVE | Noted: 2020-03-15

## 2020-03-15 PROBLEM — F41.9 ANXIETY: Status: ACTIVE | Noted: 2020-03-15

## 2020-03-15 PROBLEM — I63.81 LEFT SIDED LACUNAR INFARCTION (HCC): Status: ACTIVE | Noted: 2020-03-15

## 2020-03-15 PROBLEM — G45.9 TIA (TRANSIENT ISCHEMIC ATTACK): Status: ACTIVE | Noted: 2020-03-15

## 2020-03-15 LAB
ALBUMIN SERPL-MCNC: 3.9 G/DL (ref 3.2–4.6)
ALBUMIN/GLOB SERPL: 1.1 {RATIO} (ref 1.2–3.5)
ALP SERPL-CCNC: 76 U/L (ref 50–130)
ALT SERPL-CCNC: 20 U/L (ref 12–65)
ANION GAP SERPL CALC-SCNC: 6 MMOL/L (ref 7–16)
AST SERPL-CCNC: 27 U/L (ref 15–37)
ATRIAL RATE: 92 BPM
BASOPHILS # BLD: 0.1 K/UL (ref 0–0.2)
BASOPHILS NFR BLD: 1 % (ref 0–2)
BILIRUB SERPL-MCNC: 0.8 MG/DL (ref 0.2–1.1)
BUN SERPL-MCNC: 14 MG/DL (ref 8–23)
CALCIUM SERPL-MCNC: 9.6 MG/DL (ref 8.3–10.4)
CALCULATED P AXIS, ECG09: 79 DEGREES
CALCULATED R AXIS, ECG10: 74 DEGREES
CALCULATED T AXIS, ECG11: 52 DEGREES
CHLORIDE SERPL-SCNC: 105 MMOL/L (ref 98–107)
CO2 SERPL-SCNC: 26 MMOL/L (ref 21–32)
CREAT SERPL-MCNC: 0.86 MG/DL (ref 0.6–1)
DIAGNOSIS, 93000: NORMAL
DIFFERENTIAL METHOD BLD: NORMAL
EOSINOPHIL # BLD: 0.1 K/UL (ref 0–0.8)
EOSINOPHIL NFR BLD: 2 % (ref 0.5–7.8)
ERYTHROCYTE [DISTWIDTH] IN BLOOD BY AUTOMATED COUNT: 12.3 % (ref 11.9–14.6)
GLOBULIN SER CALC-MCNC: 3.7 G/DL (ref 2.3–3.5)
GLUCOSE BLD STRIP.AUTO-MCNC: 98 MG/DL (ref 65–100)
GLUCOSE SERPL-MCNC: 97 MG/DL (ref 65–100)
HCT VFR BLD AUTO: 42.8 % (ref 35.8–46.3)
HGB BLD-MCNC: 14.2 G/DL (ref 11.7–15.4)
IMM GRANULOCYTES # BLD AUTO: 0 K/UL (ref 0–0.5)
IMM GRANULOCYTES NFR BLD AUTO: 0 % (ref 0–5)
INR PPP: 0.9
LYMPHOCYTES # BLD: 1.1 K/UL (ref 0.5–4.6)
LYMPHOCYTES NFR BLD: 18 % (ref 13–44)
MCH RBC QN AUTO: 28.5 PG (ref 26.1–32.9)
MCHC RBC AUTO-ENTMCNC: 33.2 G/DL (ref 31.4–35)
MCV RBC AUTO: 85.8 FL (ref 79.6–97.8)
MONOCYTES # BLD: 0.4 K/UL (ref 0.1–1.3)
MONOCYTES NFR BLD: 7 % (ref 4–12)
NEUTS SEG # BLD: 4.4 K/UL (ref 1.7–8.2)
NEUTS SEG NFR BLD: 72 % (ref 43–78)
NRBC # BLD: 0 K/UL (ref 0–0.2)
P-R INTERVAL, ECG05: 152 MS
PLATELET # BLD AUTO: 256 K/UL (ref 150–450)
PMV BLD AUTO: 10.3 FL (ref 9.4–12.3)
POTASSIUM SERPL-SCNC: 3.6 MMOL/L (ref 3.5–5.1)
PROT SERPL-MCNC: 7.6 G/DL (ref 6.3–8.2)
PROTHROMBIN TIME: 12.8 SEC (ref 12–14.7)
Q-T INTERVAL, ECG07: 360 MS
QRS DURATION, ECG06: 82 MS
QTC CALCULATION (BEZET), ECG08: 445 MS
RBC # BLD AUTO: 4.99 M/UL (ref 4.05–5.2)
SODIUM SERPL-SCNC: 137 MMOL/L (ref 136–145)
VENTRICULAR RATE, ECG03: 92 BPM
WBC # BLD AUTO: 6.1 K/UL (ref 4.3–11.1)

## 2020-03-15 PROCEDURE — 77030040361 HC SLV COMPR DVT MDII -B

## 2020-03-15 PROCEDURE — 80053 COMPREHEN METABOLIC PANEL: CPT

## 2020-03-15 PROCEDURE — 93005 ELECTROCARDIOGRAM TRACING: CPT | Performed by: EMERGENCY MEDICINE

## 2020-03-15 PROCEDURE — 70450 CT HEAD/BRAIN W/O DYE: CPT

## 2020-03-15 PROCEDURE — 99218 HC RM OBSERVATION: CPT

## 2020-03-15 PROCEDURE — 74011250637 HC RX REV CODE- 250/637: Performed by: FAMILY MEDICINE

## 2020-03-15 PROCEDURE — 85025 COMPLETE CBC W/AUTO DIFF WBC: CPT

## 2020-03-15 PROCEDURE — 82962 GLUCOSE BLOOD TEST: CPT

## 2020-03-15 PROCEDURE — 74011250637 HC RX REV CODE- 250/637: Performed by: EMERGENCY MEDICINE

## 2020-03-15 PROCEDURE — 85610 PROTHROMBIN TIME: CPT

## 2020-03-15 PROCEDURE — 99285 EMERGENCY DEPT VISIT HI MDM: CPT

## 2020-03-15 PROCEDURE — 74011250637 HC RX REV CODE- 250/637: Performed by: INTERNAL MEDICINE

## 2020-03-15 RX ORDER — GUAIFENESIN 100 MG/5ML
81 LIQUID (ML) ORAL DAILY
Status: DISCONTINUED | OUTPATIENT
Start: 2020-03-16 | End: 2020-03-16 | Stop reason: HOSPADM

## 2020-03-15 RX ORDER — ACETAMINOPHEN 325 MG/1
650 TABLET ORAL
Status: DISCONTINUED | OUTPATIENT
Start: 2020-03-15 | End: 2020-03-16 | Stop reason: HOSPADM

## 2020-03-15 RX ORDER — ONDANSETRON 2 MG/ML
4 INJECTION INTRAMUSCULAR; INTRAVENOUS
Status: DISCONTINUED | OUTPATIENT
Start: 2020-03-15 | End: 2020-03-16 | Stop reason: HOSPADM

## 2020-03-15 RX ORDER — METOPROLOL SUCCINATE 25 MG/1
37.5 TABLET, EXTENDED RELEASE ORAL DAILY
Status: DISCONTINUED | OUTPATIENT
Start: 2020-03-16 | End: 2020-03-15

## 2020-03-15 RX ORDER — ALPRAZOLAM 0.25 MG/1
0.25 TABLET ORAL AS NEEDED
COMMUNITY

## 2020-03-15 RX ORDER — SODIUM CHLORIDE 0.9 % (FLUSH) 0.9 %
5-40 SYRINGE (ML) INJECTION AS NEEDED
Status: DISCONTINUED | OUTPATIENT
Start: 2020-03-15 | End: 2020-03-16 | Stop reason: HOSPADM

## 2020-03-15 RX ORDER — METOPROLOL SUCCINATE 25 MG/1
37.5 TABLET, EXTENDED RELEASE ORAL DAILY
Status: DISCONTINUED | OUTPATIENT
Start: 2020-03-15 | End: 2020-03-16 | Stop reason: HOSPADM

## 2020-03-15 RX ORDER — GUAIFENESIN 100 MG/5ML
324 LIQUID (ML) ORAL
Status: COMPLETED | OUTPATIENT
Start: 2020-03-15 | End: 2020-03-15

## 2020-03-15 RX ORDER — SODIUM CHLORIDE 0.9 % (FLUSH) 0.9 %
5-40 SYRINGE (ML) INJECTION EVERY 8 HOURS
Status: DISCONTINUED | OUTPATIENT
Start: 2020-03-15 | End: 2020-03-16 | Stop reason: HOSPADM

## 2020-03-15 RX ORDER — ATORVASTATIN CALCIUM 40 MG/1
40 TABLET, FILM COATED ORAL
Status: DISCONTINUED | OUTPATIENT
Start: 2020-03-15 | End: 2020-03-16

## 2020-03-15 RX ORDER — ALPRAZOLAM 0.5 MG/1
0.25 TABLET ORAL
Status: DISCONTINUED | OUTPATIENT
Start: 2020-03-15 | End: 2020-03-16 | Stop reason: HOSPADM

## 2020-03-15 RX ADMIN — METOPROLOL SUCCINATE 37.5 MG: 25 TABLET, EXTENDED RELEASE ORAL at 20:13

## 2020-03-15 RX ADMIN — ASPIRIN 81 MG 324 MG: 81 TABLET ORAL at 17:00

## 2020-03-15 RX ADMIN — ALPRAZOLAM 0.25 MG: 0.5 TABLET ORAL at 21:31

## 2020-03-15 RX ADMIN — Medication 10 ML: at 21:36

## 2020-03-15 NOTE — ED PROVIDER NOTES
Patient is a 82-year-old female who presents with left-sided numbness. States her symptoms began around 8:00 this morning and numbness to her left arm and left face lasted a total of about 15 minutes before resolving on its own. States then developed symptoms again at 3:00 this afternoon. No weakness, no headaches, no nausea or vomiting, no fevers or chills, no further complaints. Past Medical History:   Diagnosis Date    Ill-defined condition        History reviewed. No pertinent surgical history. History reviewed. No pertinent family history.     Social History     Socioeconomic History    Marital status:      Spouse name: Not on file    Number of children: Not on file    Years of education: Not on file    Highest education level: Not on file   Occupational History    Not on file   Social Needs    Financial resource strain: Not on file    Food insecurity     Worry: Not on file     Inability: Not on file    Transportation needs     Medical: Not on file     Non-medical: Not on file   Tobacco Use    Smoking status: Not on file   Substance and Sexual Activity    Alcohol use: Not on file    Drug use: Not on file    Sexual activity: Not on file   Lifestyle    Physical activity     Days per week: Not on file     Minutes per session: Not on file    Stress: Not on file   Relationships    Social connections     Talks on phone: Not on file     Gets together: Not on file     Attends Hindu service: Not on file     Active member of club or organization: Not on file     Attends meetings of clubs or organizations: Not on file     Relationship status: Not on file    Intimate partner violence     Fear of current or ex partner: Not on file     Emotionally abused: Not on file     Physically abused: Not on file     Forced sexual activity: Not on file   Other Topics Concern    Not on file   Social History Narrative    Not on file         ALLERGIES: Ciprofloxacin and Erythromycin    Review of Systems   Constitutional: Negative for chills and fever. HENT: Negative for rhinorrhea and sore throat. Eyes: Negative for visual disturbance. Respiratory: Negative for cough and shortness of breath. Cardiovascular: Negative for chest pain and leg swelling. Gastrointestinal: Negative for abdominal pain, diarrhea, nausea and vomiting. Genitourinary: Negative for dysuria. Musculoskeletal: Negative for back pain and neck pain. Skin: Negative for rash. Neurological: Positive for numbness. Negative for weakness and headaches. Psychiatric/Behavioral: The patient is not nervous/anxious. Vitals:    03/15/20 1605   BP: 125/69   Pulse: (!) 113   Resp: 17   Temp: 97.3 °F (36.3 °C)   SpO2: 100%   Weight: 62.6 kg (138 lb)   Height: 5' 8\" (1.727 m)            Physical Exam  Vitals signs and nursing note reviewed. Constitutional:       Appearance: She is well-developed. HENT:      Head: Normocephalic. Right Ear: External ear normal.      Left Ear: External ear normal.   Eyes:      Conjunctiva/sclera: Conjunctivae normal.      Pupils: Pupils are equal, round, and reactive to light. Neck:      Musculoskeletal: Normal range of motion and neck supple. Trachea: No tracheal deviation. Cardiovascular:      Rate and Rhythm: Normal rate and regular rhythm. Heart sounds: Normal heart sounds. No murmur. Pulmonary:      Effort: Pulmonary effort is normal. No respiratory distress. Breath sounds: Normal breath sounds. Abdominal:      Palpations: Abdomen is soft. Tenderness: There is no abdominal tenderness. Musculoskeletal: Normal range of motion. Skin:     Findings: No rash. Neurological:      Mental Status: She is alert and oriented to person, place, and time. Cranial Nerves: No cranial nerve deficit.       Comments: Cn 2-12 fully intact, strength and sensation 5/5 in all extremities, negative pronator drift, ambulates without difficulty, visual fields fully intact, EOMI, finger to nose normal. no focal deficits appreciated.             MDM  Number of Diagnoses or Management Options  Numbness: new and requires workup     Amount and/or Complexity of Data Reviewed  Clinical lab tests: ordered and reviewed  Tests in the radiology section of CPT®: ordered and reviewed  Tests in the medicine section of CPT®: ordered and reviewed  Review and summarize past medical records: yes    Risk of Complications, Morbidity, and/or Mortality  Presenting problems: high  Diagnostic procedures: high  Management options: high    Patient Progress  Patient progress: stable         Procedures  Recent Results (from the past 12 hour(s))   EKG, 12 LEAD, INITIAL    Collection Time: 03/15/20  4:33 PM   Result Value Ref Range    Ventricular Rate 92 BPM    Atrial Rate 92 BPM    P-R Interval 152 ms    QRS Duration 82 ms    Q-T Interval 360 ms    QTC Calculation (Bezet) 445 ms    Calculated P Axis 79 degrees    Calculated R Axis 74 degrees    Calculated T Axis 52 degrees    Diagnosis       Normal sinus rhythm  Possible Left atrial enlargement  ST abnormality, possible digitalis effect  Abnormal ECG  When compared with ECG of 27-OCT-2019 18:59,  No significant change was found  Confirmed by Mirela Ferreira ([id number]) on 3/15/2020 5:31:43 PM     GLUCOSE, POC    Collection Time: 03/15/20  4:37 PM   Result Value Ref Range    Glucose (POC) 98 65 - 100 mg/dL   PROTHROMBIN TIME + INR    Collection Time: 03/15/20  4:45 PM   Result Value Ref Range    Prothrombin time 12.8 12.0 - 14.7 sec    INR 0.9     CBC WITH AUTOMATED DIFF    Collection Time: 03/15/20  4:46 PM   Result Value Ref Range    WBC 6.1 4.3 - 11.1 K/uL    RBC 4.99 4.05 - 5.2 M/uL    HGB 14.2 11.7 - 15.4 g/dL    HCT 42.8 35.8 - 46.3 %    MCV 85.8 79.6 - 97.8 FL    MCH 28.5 26.1 - 32.9 PG    MCHC 33.2 31.4 - 35.0 g/dL    RDW 12.3 11.9 - 14.6 %    PLATELET 986 228 - 279 K/uL    MPV 10.3 9.4 - 12.3 FL    ABSOLUTE NRBC 0.00 0.0 - 0.2 K/uL    DF AUTOMATED NEUTROPHILS 72 43 - 78 %    LYMPHOCYTES 18 13 - 44 %    MONOCYTES 7 4.0 - 12.0 %    EOSINOPHILS 2 0.5 - 7.8 %    BASOPHILS 1 0.0 - 2.0 %    IMMATURE GRANULOCYTES 0 0.0 - 5.0 %    ABS. NEUTROPHILS 4.4 1.7 - 8.2 K/UL    ABS. LYMPHOCYTES 1.1 0.5 - 4.6 K/UL    ABS. MONOCYTES 0.4 0.1 - 1.3 K/UL    ABS. EOSINOPHILS 0.1 0.0 - 0.8 K/UL    ABS. BASOPHILS 0.1 0.0 - 0.2 K/UL    ABS. IMM. GRANS. 0.0 0.0 - 0.5 K/UL   METABOLIC PANEL, COMPREHENSIVE    Collection Time: 03/15/20  4:46 PM   Result Value Ref Range    Sodium 137 136 - 145 mmol/L    Potassium 3.6 3.5 - 5.1 mmol/L    Chloride 105 98 - 107 mmol/L    CO2 26 21 - 32 mmol/L    Anion gap 6 (L) 7 - 16 mmol/L    Glucose 97 65 - 100 mg/dL    BUN 14 8 - 23 MG/DL    Creatinine 0.86 0.6 - 1.0 MG/DL    GFR est AA >60 >60 ml/min/1.73m2    GFR est non-AA >60 >60 ml/min/1.73m2    Calcium 9.6 8.3 - 10.4 MG/DL    Bilirubin, total 0.8 0.2 - 1.1 MG/DL    ALT (SGPT) 20 12 - 65 U/L    AST (SGOT) 27 15 - 37 U/L    Alk. phosphatase 76 50 - 130 U/L    Protein, total 7.6 6.3 - 8.2 g/dL    Albumin 3.9 3.2 - 4.6 g/dL    Globulin 3.7 (H) 2.3 - 3.5 g/dL    A-G Ratio 1.1 (L) 1.2 - 3.5       Ct Head Wo Cont    Result Date: 3/15/2020  NONCONTRAST HEAD CT CLINICAL HISTORY:  Left-sided numbness since this morning. TECHNIQUE:  Axial images were obtained with spiral technique. Radiation dose reduction was achieved using one or all of the following techniques: automated exposure control, weight-based dosing, iterative reconstruction. COMPARISON:  None. REPORT:   Standard noncontrast head CT demonstrates no definite intracranial mass effect, hemorrhage, or evidence of acute geographic infarction. There is a 1.5 cm well circumscribed, near-water attenuation structure and the anterior left corona radiata which is most consistent with a remote lacunar infarct. The ventricles are normal in size and configuration, accounting for the patient's age. Orbits  and paranasal sinuses are clear where imaged.  Bone windows demonstrate no definite fracture or destruction.      IMPRESSION:     LEFT CORONA RADIATA REMOTE LACUNAR INFARCT WITH NO ACUTE INTRACRANIAL ABNORMALITY IDENTIFIED AT NONCONTRAST CT.     19-year-old female with numbness:      Admit for stroke workup

## 2020-03-15 NOTE — H&P
Hospitalist Note     Admit Date:  3/15/2020  4:11 PM   Name:  Julio Gibbs   Age:  78 y.o.  :  1950   MRN:  [id number]   PCP:  Sheyla Mercado NP  Treatment Team: Attending Provider: Christoph Monaco MD; Primary Nurse: Jonna Goldman RN    HPI/Subjective:   Mrs. Denise Millan is a 79 y/o female with a h/o anxiety p/w acute onset numbness of the LUE distal to the elbow that started around 0800 today. This lasted approximately 10 minutes then she noticed some numbness to the left side of her lower lip which last a few more minutes. Symptoms resolved, however her symptoms returned around 3pm. She has a long-standing history of palpitations and was put on Toprol nearly 15 years ago but has never been diagnosed with AFib or flutter. Lately she's been having more frequent episodes of palpitations a/w SOB, but no chest pain, orthopnea, edema or chest pain. Saw her doctor earlier this month for a migraine and was given a shot of steroids. She then developed hallucinations and went to the ER at [facility] on 3/6 and had a head CT that showed only a remote left lacunar infarct. She was ultimately discharged home. Currently her symptoms have resolved and NIH 0. Otherwise she denies headache, visual changes, hallucinations, N/V/D, abdominal pain, fevers. VS and labs are normal. Head CT with old left lacunar infarct, similar to CT on 3/6. Seen by tele-neurology and recommend stroke/TIA work up. Hospitalist consulted for admission and further management. 10 systems reviewed and negative except as noted in HPI.   Past Medical History:   Diagnosis Date    Anxiety     Ill-defined condition     Palpitations       Past Surgical History:   Procedure Laterality Date    HX CHOLECYSTECTOMY      HX HYSTERECTOMY      HX LUMBAR LAMINECTOMY        Allergies   Allergen Reactions    Ciprofloxacin Rash    Erythromycin Diarrhea      Social History     Tobacco Use    Smoking status: Never Smoker    Smokeless tobacco: Never Used   Substance Use Topics    Alcohol use: Not Currently      Family History   Problem Relation Age of Onset    Elevated Lipids Mother     Hypertension Mother     Hypertension Father     Diabetes Father     Heart Failure Father     Elevated Lipids Father       Family history reviewed and noncontributory. There is no immunization history on file for this patient. PTA Medications:  Prior to Admission Medications   Prescriptions Last Dose Informant Patient Reported? Taking? ALPRAZolam (Xanax) 0.25 mg tablet   Yes Yes   Sig: Take 0.25 mg by mouth as needed for Anxiety. metoprolol succinate (TOPROL-XL) 25 mg XL tablet   Yes Yes   Sig: Take 25 mg by mouth daily. Facility-Administered Medications: None       Objective:     Patient Vitals for the past 24 hrs:   Temp Pulse Resp BP SpO2   03/15/20 1747  95 17 150/76 100 %   03/15/20 1727  86 8 133/73 99 %   03/15/20 1708  85 10 132/70 100 %   03/15/20 1643  87 10 151/72 100 %   03/15/20 1635  93 11 138/76 100 %   03/15/20 1605 97.3 °F (36.3 °C) (!) 113 17 125/69 100 %     Oxygen Therapy  O2 Sat (%): 100 % (03/15/20 1747)  Pulse via Oximetry: 95 beats per minute (03/15/20 1747)  O2 Device: Room air (03/15/20 1605)    Estimated body mass index is 20.98 kg/m² as calculated from the following:    Height as of this encounter: 5' 8\" (1.727 m). Weight as of this encounter: 62.6 kg (138 lb). No intake or output data in the 24 hours ending 03/15/20 1812    *Note that automatically entered I/Os may not be accurate; dependent on patient compliance with collection and accurate  by assistants. Physical Exam:  General:    Well nourished. Alert. Eyes:   Normal sclerae. Extraocular movements intact. HENT:  Normocephalic, atraumatic. Moist mucous membranes  CV:   RRR. No m/r/g. Lungs:  CTAB. No wheezing, rhonchi, or rales. Abdomen: Soft, nontender, nondistended. Extremities: Warm and dry. No cyanosis or edema.   Neurologic: CN II-XII grossly intact. Sensation intact. Skin:     No rashes or jaundice. Normal coloration  Psych:  Normal mood and affect. I reviewed the labs, imaging, EKGs, telemetry, and other studies done this admission. Data Review:   Recent Results (from the past 24 hour(s))   EKG, 12 LEAD, INITIAL    Collection Time: 03/15/20  4:33 PM   Result Value Ref Range    Ventricular Rate 92 BPM    Atrial Rate 92 BPM    P-R Interval 152 ms    QRS Duration 82 ms    Q-T Interval 360 ms    QTC Calculation (Bezet) 445 ms    Calculated P Axis 79 degrees    Calculated R Axis 74 degrees    Calculated T Axis 52 degrees    Diagnosis       Normal sinus rhythm  Possible Left atrial enlargement  ST abnormality, possible digitalis effect  Abnormal ECG  When compared with ECG of 27-OCT-2019 18:59,  No significant change was found  Confirmed by Zulma Abreu ([id number]) on 3/15/2020 5:31:43 PM     GLUCOSE, POC    Collection Time: 03/15/20  4:37 PM   Result Value Ref Range    Glucose (POC) 98 65 - 100 mg/dL   PROTHROMBIN TIME + INR    Collection Time: 03/15/20  4:45 PM   Result Value Ref Range    Prothrombin time 12.8 12.0 - 14.7 sec    INR 0.9     CBC WITH AUTOMATED DIFF    Collection Time: 03/15/20  4:46 PM   Result Value Ref Range    WBC 6.1 4.3 - 11.1 K/uL    RBC 4.99 4.05 - 5.2 M/uL    HGB 14.2 11.7 - 15.4 g/dL    HCT 42.8 35.8 - 46.3 %    MCV 85.8 79.6 - 97.8 FL    MCH 28.5 26.1 - 32.9 PG    MCHC 33.2 31.4 - 35.0 g/dL    RDW 12.3 11.9 - 14.6 %    PLATELET 924 063 - 604 K/uL    MPV 10.3 9.4 - 12.3 FL    ABSOLUTE NRBC 0.00 0.0 - 0.2 K/uL    DF AUTOMATED      NEUTROPHILS 72 43 - 78 %    LYMPHOCYTES 18 13 - 44 %    MONOCYTES 7 4.0 - 12.0 %    EOSINOPHILS 2 0.5 - 7.8 %    BASOPHILS 1 0.0 - 2.0 %    IMMATURE GRANULOCYTES 0 0.0 - 5.0 %    ABS. NEUTROPHILS 4.4 1.7 - 8.2 K/UL    ABS. LYMPHOCYTES 1.1 0.5 - 4.6 K/UL    ABS. MONOCYTES 0.4 0.1 - 1.3 K/UL    ABS. EOSINOPHILS 0.1 0.0 - 0.8 K/UL    ABS. BASOPHILS 0.1 0.0 - 0.2 K/UL    ABS. IMM.  GRANS. 0.0 0.0 - 0.5 K/UL   METABOLIC PANEL, COMPREHENSIVE    Collection Time: 03/15/20  4:46 PM   Result Value Ref Range    Sodium 137 136 - 145 mmol/L    Potassium 3.6 3.5 - 5.1 mmol/L    Chloride 105 98 - 107 mmol/L    CO2 26 21 - 32 mmol/L    Anion gap 6 (L) 7 - 16 mmol/L    Glucose 97 65 - 100 mg/dL    BUN 14 8 - 23 MG/DL    Creatinine 0.86 0.6 - 1.0 MG/DL    GFR est AA >60 >60 ml/min/1.73m2    GFR est non-AA >60 >60 ml/min/1.73m2    Calcium 9.6 8.3 - 10.4 MG/DL    Bilirubin, total 0.8 0.2 - 1.1 MG/DL    ALT (SGPT) 20 12 - 65 U/L    AST (SGOT) 27 15 - 37 U/L    Alk. phosphatase 76 50 - 130 U/L    Protein, total 7.6 6.3 - 8.2 g/dL    Albumin 3.9 3.2 - 4.6 g/dL    Globulin 3.7 (H) 2.3 - 3.5 g/dL    A-G Ratio 1.1 (L) 1.2 - 3.5         All Micro Results     None          Current Facility-Administered Medications   Medication Dose Route Frequency    [START ON 3/16/2020] metoprolol succinate (TOPROL-XL) XL tablet 37.5 mg  37.5 mg Oral DAILY     Current Outpatient Medications   Medication Sig    ALPRAZolam (Xanax) 0.25 mg tablet Take 0.25 mg by mouth as needed for Anxiety.  metoprolol succinate (TOPROL-XL) 25 mg XL tablet Take 25 mg by mouth daily. Other Studies:  No results found for this visit on 03/15/20. Ct Head Wo Cont    Result Date: 3/15/2020  NONCONTRAST HEAD CT CLINICAL HISTORY:  Left-sided numbness since this morning. TECHNIQUE:  Axial images were obtained with spiral technique. Radiation dose reduction was achieved using one or all of the following techniques: automated exposure control, weight-based dosing, iterative reconstruction. COMPARISON:  None. REPORT:   Standard noncontrast head CT demonstrates no definite intracranial mass effect, hemorrhage, or evidence of acute geographic infarction. There is a 1.5 cm well circumscribed, near-water attenuation structure and the anterior left corona radiata which is most consistent with a remote lacunar infarct.   The ventricles are normal in size and configuration, accounting for the patient's age. Orbits  and paranasal sinuses are clear where imaged. Bone windows demonstrate no definite fracture or destruction. IMPRESSION:     LEFT CORONA RADIATA REMOTE LACUNAR INFARCT WITH NO ACUTE INTRACRANIAL ABNORMALITY IDENTIFIED AT NONCONTRAST CT. Assessment and Plan:     Hospital Problems as of 3/15/2020 Date Reviewed: 3/15/2020          Codes Class Noted - Resolved POA    * (Principal) Left sided numbness ICD-10-CM: R20.0  ICD-9-CM: 782.0  3/15/2020 - Present Unknown        Anxiety ICD-10-CM: F41.9  ICD-9-CM: 300.00  3/15/2020 - Present Unknown        Left sided lacunar infarction [facility]) ICD-10-CM: I63.81  ICD-9-CM: 434.91  3/15/2020 - Present Unknown        Palpitations ICD-10-CM: R00.2  ICD-9-CM: 785.1  3/15/2020 - Present Unknown        TIA (transient ischemic attack) ICD-10-CM: G45.9  ICD-9-CM: 435.9  3/15/2020 - Present Unknown              Plan:  # Left-sided numbness   - TIA v CVA. Old CVA on CT.   - Admit to obs for stroke work up, neuro consult tomorrow   - May need cardiac monitoring at discharge   - ASA, statin    # H/o palpitations   - Con't Toprol. # Anxiety   - Cause of palpitations? Discharge planning: Home when able. DVT ppx ordered; SCDs. Code status:  Full.  is POA.   Estimated LOS:  Greater than 2 midnights  Risk:  high    Signed:  Daryn Dan MD

## 2020-03-15 NOTE — ED TRIAGE NOTES
Pt c/o left arm numbness that started this morning around 0800, that lasted around 10-15 minutes then immediately left side of mouth became numb that lasted two minutes. Pt states her left arm became numb again around 1500 and lasted 15 minutes. Pt denies any numbness or tingling at this time. Pt denies history of stroke. NIH stroke scale 0.

## 2020-03-15 NOTE — PROGRESS NOTES
Received pt from ER in stable condition. Pt assisted into bed. Dual NIH performed with Kristan ESTES from the ER. Oriented pt to room, call light & meals.

## 2020-03-15 NOTE — PROGRESS NOTES
TRANSFER - IN REPORT:    Verbal report received from Ana Mccall RN (name) on Graciela Sunshine  being received from ER (unit) for routine progression of care      Report consisted of patients Situation, Background, Assessment and   Recommendations(SBAR). Information from the following report(s) SBAR, Kardex, Intake/Output, MAR and Recent Results was reviewed with the receiving nurse. Opportunity for questions and clarification was provided. Assessment completed upon patients arrival to unit and care assumed.

## 2020-03-16 ENCOUNTER — APPOINTMENT (OUTPATIENT)
Dept: MRI IMAGING | Age: 70
End: 2020-03-16
Attending: INTERNAL MEDICINE
Payer: COMMERCIAL

## 2020-03-16 VITALS
WEIGHT: 138 LBS | TEMPERATURE: 95.2 F | HEIGHT: 68 IN | HEART RATE: 115 BPM | SYSTOLIC BLOOD PRESSURE: 85 MMHG | OXYGEN SATURATION: 97 % | RESPIRATION RATE: 15 BRPM | DIASTOLIC BLOOD PRESSURE: 59 MMHG | BODY MASS INDEX: 20.92 KG/M2

## 2020-03-16 LAB
CHOLEST SERPL-MCNC: 136 MG/DL
CK SERPL-CCNC: 35 U/L (ref 21–215)
EST. AVERAGE GLUCOSE BLD GHB EST-MCNC: 108 MG/DL
HBA1C MFR BLD: 5.4 %
HDLC SERPL-MCNC: 59 MG/DL (ref 40–60)
HDLC SERPL: 2.3 {RATIO}
LDLC SERPL CALC-MCNC: 70 MG/DL
LIPID PROFILE,FLP: NORMAL
TRIGL SERPL-MCNC: 35 MG/DL (ref 35–150)
VLDLC SERPL CALC-MCNC: 7 MG/DL (ref 6–23)

## 2020-03-16 PROCEDURE — 74011250637 HC RX REV CODE- 250/637: Performed by: FAMILY MEDICINE

## 2020-03-16 PROCEDURE — 97161 PT EVAL LOW COMPLEX 20 MIN: CPT

## 2020-03-16 PROCEDURE — 80061 LIPID PANEL: CPT

## 2020-03-16 PROCEDURE — 93306 TTE W/DOPPLER COMPLETE: CPT

## 2020-03-16 PROCEDURE — 97165 OT EVAL LOW COMPLEX 30 MIN: CPT

## 2020-03-16 PROCEDURE — 74011250637 HC RX REV CODE- 250/637: Performed by: PSYCHIATRY & NEUROLOGY

## 2020-03-16 PROCEDURE — 70551 MRI BRAIN STEM W/O DYE: CPT

## 2020-03-16 PROCEDURE — 70544 MR ANGIOGRAPHY HEAD W/O DYE: CPT

## 2020-03-16 PROCEDURE — A9575 INJ GADOTERATE MEGLUMI 0.1ML: HCPCS | Performed by: INTERNAL MEDICINE

## 2020-03-16 PROCEDURE — 74011250637 HC RX REV CODE- 250/637: Performed by: INTERNAL MEDICINE

## 2020-03-16 PROCEDURE — 82550 ASSAY OF CK (CPK): CPT

## 2020-03-16 PROCEDURE — 86038 ANTINUCLEAR ANTIBODIES: CPT

## 2020-03-16 PROCEDURE — 99218 HC RM OBSERVATION: CPT

## 2020-03-16 PROCEDURE — 83036 HEMOGLOBIN GLYCOSYLATED A1C: CPT

## 2020-03-16 PROCEDURE — 74011250636 HC RX REV CODE- 250/636: Performed by: INTERNAL MEDICINE

## 2020-03-16 PROCEDURE — 74011000258 HC RX REV CODE- 258: Performed by: INTERNAL MEDICINE

## 2020-03-16 PROCEDURE — 70548 MR ANGIOGRAPHY NECK W/DYE: CPT

## 2020-03-16 PROCEDURE — 36415 COLL VENOUS BLD VENIPUNCTURE: CPT

## 2020-03-16 RX ORDER — GUAIFENESIN 100 MG/5ML
81 LIQUID (ML) ORAL DAILY
Qty: 30 TAB | Refills: 0 | Status: SHIPPED | OUTPATIENT
Start: 2020-03-17

## 2020-03-16 RX ORDER — CLOPIDOGREL BISULFATE 75 MG/1
75 TABLET ORAL DAILY
Status: DISCONTINUED | OUTPATIENT
Start: 2020-03-17 | End: 2020-03-16 | Stop reason: HOSPADM

## 2020-03-16 RX ORDER — CLOPIDOGREL BISULFATE 75 MG/1
300 TABLET ORAL ONCE
Status: COMPLETED | OUTPATIENT
Start: 2020-03-16 | End: 2020-03-16

## 2020-03-16 RX ORDER — ATORVASTATIN CALCIUM 10 MG/1
10 TABLET, FILM COATED ORAL
Qty: 30 TAB | Refills: 0 | Status: SHIPPED | OUTPATIENT
Start: 2020-03-16 | End: 2020-07-20

## 2020-03-16 RX ORDER — CLOPIDOGREL BISULFATE 75 MG/1
75 TABLET ORAL DAILY
Qty: 21 TAB | Refills: 0 | Status: SHIPPED | OUTPATIENT
Start: 2020-03-17 | End: 2020-07-20

## 2020-03-16 RX ORDER — GADOTERATE MEGLUMINE 376.9 MG/ML
20 INJECTION INTRAVENOUS
Status: COMPLETED | OUTPATIENT
Start: 2020-03-16 | End: 2020-03-16

## 2020-03-16 RX ORDER — ATORVASTATIN CALCIUM 10 MG/1
10 TABLET, FILM COATED ORAL
Status: DISCONTINUED | OUTPATIENT
Start: 2020-03-16 | End: 2020-03-16 | Stop reason: HOSPADM

## 2020-03-16 RX ADMIN — CLOPIDOGREL BISULFATE 300 MG: 75 TABLET ORAL at 14:18

## 2020-03-16 RX ADMIN — ASPIRIN 81 MG 81 MG: 81 TABLET ORAL at 08:54

## 2020-03-16 RX ADMIN — ALPRAZOLAM 0.25 MG: 0.5 TABLET ORAL at 07:28

## 2020-03-16 RX ADMIN — SODIUM CHLORIDE 100 ML: 900 INJECTION, SOLUTION INTRAVENOUS at 08:29

## 2020-03-16 RX ADMIN — GADOTERATE MEGLUMINE 20 ML: 376.9 INJECTION INTRAVENOUS at 08:29

## 2020-03-16 RX ADMIN — Medication 10 ML: at 06:16

## 2020-03-16 NOTE — DISCHARGE SUMMARY
Hospitalist Discharge Summary     Admit Date:  3/15/2020  4:11 PM   Name:  Ese Quiroz   Age:  78 y.o.  :  1950   MRN:  [id number]   PCP:  Sulma Shin NP  Treatment Team: Attending Provider: Mckenzie Clemons MD; Consulting Provider: Zainab Resendez DO; Physical Therapist: Burnetta Goltz, PT; Occupational Therapist: Leonard Pantoja OT    Problem List for this Hospitalization:  Hospital Problems as of 3/16/2020 Date Reviewed: 3/15/2020          Codes Class Noted - Resolved POA    * (Principal) Left sided numbness ICD-10-CM: R20.0  ICD-9-CM: 782.0  3/15/2020 - Present Unknown        Anxiety ICD-10-CM: F41.9  ICD-9-CM: 300.00  3/15/2020 - Present Unknown        Left sided lacunar infarction [facility]) ICD-10-CM: I63.81  ICD-9-CM: 434.91  3/15/2020 - Present Unknown        Palpitations ICD-10-CM: R00.2  ICD-9-CM: 785.1  3/15/2020 - Present Unknown        TIA (transient ischemic attack) ICD-10-CM: G45.9  ICD-9-CM: 435.9  3/15/2020 - Present Unknown            Hospital Course:  Mrs. Manuel Young is a 79 y/o female with a h/o anxiety p/w acute onset numbness of the LUE distal to the elbow that started around 0800 today. This lasted approximately 10 minutes then she noticed some numbness to the left side of her lower lip which last a few more minutes. Symptoms resolved, however her symptoms returned around 3pm. Initial head CT showed possible old left lacunar infarct (also seen on CT earlier this month which was done for a migraine). Symptoms did not recur. She was admitted for CVA/TIA work up. MRA brain/neck both normal; MRI brain showed left sided cyst vs prominent perivascular space. Neurology was consulted, suspects a possible TIA. Patient loaded with Plavix 300mg today then will con't 75mg daily for 21 days. Atorvastatin decreased to 10mg. Con't ASA 81mg. No events on telemetry. TTE pending at this time. A1C and lipids normal (LDL 70). No recurrence of symptoms.  Follow up with Dr. Hu Rojo as an outpatient. Hospital course otherwise unremarkable and she is medically stable for discharge. Disposition: Home or Self Care  Activity: Activity as tolerated   Diet: DIET CARDIAC Regular  Code Status: Full Code    Follow Up Orders:  No orders of the defined types were placed in this encounter. Follow-up Information     Follow up With Specialties Details Why Contact Shirley Shah NP Nurse Practitioner   [address]  [phone]      Melva Luther DO Neurology Schedule an appointment as soon as possible for a visit in 2 weeks  [address] [id number]  [phone]            Discharge meds at bottom of this note. Plan was discussed with patient, nursing, CM. All questions answered. Patient was stable at time of discharge. Patient will call a physician or return if any concerns. Discharge summary and encounter summary was sent to PCP electronically via \"Comm Mgt\" link in [facility], if possible. Diagnostic Imaging/Tests:   Mra Brain Wo Cont    Result Date: 3/16/2020  INDICATION:   Left sided numbness. COMPARISON:  None TECHNIQUE:  3-D time-of-flight MRA of the brain was performed. Multiplanar reconstructions were obtained. FINDINGS: The vertebral arteries are codominant. The basilar artery and its branches are normal. The internal carotid, anterior cerebral, and middle cerebral arteries are patent. There is no flow-limiting intracranial stenosis. There is no aneurysm. Fetal origin of the right PCA. Shraddha Rodas IMPRESSION:  Grossly unremarkable study. Mra Neck W Cont    Result Date: 3/16/2020  EXAM:  MRA NECK W CONT INDICATION:    Left sided numbness. TIAs COMPARISON:  None TECHNIQUE:  Contrast enhanced 3D coronal acquisition and 2D time-of-flight MRA of the neck were performed. Multiplanar MIP and 3D shaded surface display reconstructions were obtained. FINDINGS: There is conventional three vessel arch anatomy.  The bilateral subclavian, common carotid, and internal carotid arteries are patent with no flow-limiting stenosis. Mild irregularity of both bulbs suggests mild plaque. % of right carotid artery stenosis: Less than 25 % of left carotid artery stenosis: Less than 25 NASCET method was utilized for calculating stenosis. The vertebral arteries are codominant and patent. IMPRESSION:  No significant stenosis     Mri Brain Wo Cont    Result Date: 3/16/2020  Clinical History: The patient is a 78years year old Female presenting with symptoms of Left arm/face numbness. Comparison:  Head CT 3/15/2020 Technique:  Axial T2, axial FLAIR, axial diffusion-weighted,  sagittal T1 and coronal gradient-echo scans were performed. Findings: There is no evidence of acute intracranial abnormality . A tear drop shaped neural cyst or prominent perivascular space is demonstrated in the left centrum semiovale. Normal gray-white matter differentiation is seen for age. There are no abnormal extra-axial fluid collections. No evidence of mass or mass effect is seen. There is no diffusion signal abnormality. Expected flow voids are maintained in the major intracranial vessels. The cerebellum and brainstem are unremarkable. There is no evidence of Chiari malformation. The ventricular system and CSF containing spaces are unremarkable in appearance. Visualized extracranial soft tissues are unremarkable. The paranasal sinuses are well pneumatized and aerated. Impression:  1. No acute intracranial abnormality. 2. Neural cyst or prominent perivascular space in the left centrum semiovale. CPT code(s) [id number]     Ct Head Wo Cont    Result Date: 3/15/2020  NONCONTRAST HEAD CT CLINICAL HISTORY:  Left-sided numbness since this morning. TECHNIQUE:  Axial images were obtained with spiral technique. Radiation dose reduction was achieved using one or all of the following techniques: automated exposure control, weight-based dosing, iterative reconstruction.  COMPARISON: None. REPORT:   Standard noncontrast head CT demonstrates no definite intracranial mass effect, hemorrhage, or evidence of acute geographic infarction. There is a 1.5 cm well circumscribed, near-water attenuation structure and the anterior left corona radiata which is most consistent with a remote lacunar infarct. The ventricles are normal in size and configuration, accounting for the patient's age. Orbits  and paranasal sinuses are clear where imaged. Bone windows demonstrate no definite fracture or destruction. IMPRESSION:     LEFT CORONA RADIATA REMOTE LACUNAR INFARCT WITH NO ACUTE INTRACRANIAL ABNORMALITY IDENTIFIED AT NONCONTRAST CT. Echocardiogram results:  No results found for this visit on 03/15/20.     Procedures done this admission:  * No surgery found *    All Micro Results     None          Labs: Results:       BMP, Mg, Phos Recent Labs     03/15/20  1646      K 3.6      CO2 26   AGAP 6*   BUN 14   CREA 0.86   CA 9.6   GLU 97      CBC Recent Labs     03/15/20  1646   WBC 6.1   RBC 4.99   HGB 14.2   HCT 42.8      GRANS 72   LYMPH 18   EOS 2   MONOS 7   BASOS 1   IG 0   ANEU 4.4   ABL 1.1   ROBERT 0.1   ABM 0.4   ABB 0.1   AIG 0.0      LFT Recent Labs     03/15/20  1646   SGOT 27   ALT 20   AP 76   TP 7.6   ALB 3.9   GLOB 3.7*   AGRAT 1.1*      Cardiac Testing Lab Results   Component Value Date/Time    BNP 25 (H) 10/27/2019 04:19 PM    Troponin-I, Qt. <0.02 (L) 10/27/2019 04:19 PM      Coagulation Tests Lab Results   Component Value Date/Time    Prothrombin time 12.8 03/15/2020 04:45 PM    INR 0.9 03/15/2020 04:45 PM      A1c Lab Results   Component Value Date/Time    Hemoglobin A1c 5.4 03/16/2020 05:16 AM      Lipid Panel Lab Results   Component Value Date/Time    Cholesterol, total 136 03/16/2020 05:16 AM    HDL Cholesterol 59 03/16/2020 05:16 AM    LDL, calculated 70 03/16/2020 05:16 AM    VLDL, calculated 7 03/16/2020 05:16 AM    Triglyceride 35 03/16/2020 05:16 AM CHOL/HDL Ratio 2.3 03/16/2020 05:16 AM      Thyroid Panel No results found for: TSH, T4, FT4, TT3, T3U, TSHEXT     Most Recent UA No results found for: COLOR, APPRN, REFSG, TOYIN, PROTU, GLUCU, KETU, BILU, BLDU, UROU, MILE, LEUKU, WBCU, RBCU, UEPI, BACTU, CASTS, UCRY, MUCUS, UCOM     Allergies   Allergen Reactions    Ciprofloxacin Rash    Erythromycin Diarrhea       There is no immunization history on file for this patient.     All Labs from Last 24 Hrs:  Recent Results (from the past 24 hour(s))   EKG, 12 LEAD, INITIAL    Collection Time: 03/15/20  4:33 PM   Result Value Ref Range    Ventricular Rate 92 BPM    Atrial Rate 92 BPM    P-R Interval 152 ms    QRS Duration 82 ms    Q-T Interval 360 ms    QTC Calculation (Bezet) 445 ms    Calculated P Axis 79 degrees    Calculated R Axis 74 degrees    Calculated T Axis 52 degrees    Diagnosis       Normal sinus rhythm  Possible Left atrial enlargement  ST abnormality, possible digitalis effect  Abnormal ECG  When compared with ECG of 27-OCT-2019 18:59,  No significant change was found  Confirmed by Chandan Moss ([id number]) on 3/15/2020 5:31:43 PM     GLUCOSE, POC    Collection Time: 03/15/20  4:37 PM   Result Value Ref Range    Glucose (POC) 98 65 - 100 mg/dL   PROTHROMBIN TIME + INR    Collection Time: 03/15/20  4:45 PM   Result Value Ref Range    Prothrombin time 12.8 12.0 - 14.7 sec    INR 0.9     CBC WITH AUTOMATED DIFF    Collection Time: 03/15/20  4:46 PM   Result Value Ref Range    WBC 6.1 4.3 - 11.1 K/uL    RBC 4.99 4.05 - 5.2 M/uL    HGB 14.2 11.7 - 15.4 g/dL    HCT 42.8 35.8 - 46.3 %    MCV 85.8 79.6 - 97.8 FL    MCH 28.5 26.1 - 32.9 PG    MCHC 33.2 31.4 - 35.0 g/dL    RDW 12.3 11.9 - 14.6 %    PLATELET 737 185 - 627 K/uL    MPV 10.3 9.4 - 12.3 FL    ABSOLUTE NRBC 0.00 0.0 - 0.2 K/uL    DF AUTOMATED      NEUTROPHILS 72 43 - 78 %    LYMPHOCYTES 18 13 - 44 %    MONOCYTES 7 4.0 - 12.0 %    EOSINOPHILS 2 0.5 - 7.8 %    BASOPHILS 1 0.0 - 2.0 %    IMMATURE GRANULOCYTES 0 0.0 - 5.0 %    ABS. NEUTROPHILS 4.4 1.7 - 8.2 K/UL    ABS. LYMPHOCYTES 1.1 0.5 - 4.6 K/UL    ABS. MONOCYTES 0.4 0.1 - 1.3 K/UL    ABS. EOSINOPHILS 0.1 0.0 - 0.8 K/UL    ABS. BASOPHILS 0.1 0.0 - 0.2 K/UL    ABS. IMM. GRANS. 0.0 0.0 - 0.5 K/UL   METABOLIC PANEL, COMPREHENSIVE    Collection Time: 03/15/20  4:46 PM   Result Value Ref Range    Sodium 137 136 - 145 mmol/L    Potassium 3.6 3.5 - 5.1 mmol/L    Chloride 105 98 - 107 mmol/L    CO2 26 21 - 32 mmol/L    Anion gap 6 (L) 7 - 16 mmol/L    Glucose 97 65 - 100 mg/dL    BUN 14 8 - 23 MG/DL    Creatinine 0.86 0.6 - 1.0 MG/DL    GFR est AA >60 >60 ml/min/1.73m2    GFR est non-AA >60 >60 ml/min/1.73m2    Calcium 9.6 8.3 - 10.4 MG/DL    Bilirubin, total 0.8 0.2 - 1.1 MG/DL    ALT (SGPT) 20 12 - 65 U/L    AST (SGOT) 27 15 - 37 U/L    Alk.  phosphatase 76 50 - 130 U/L    Protein, total 7.6 6.3 - 8.2 g/dL    Albumin 3.9 3.2 - 4.6 g/dL    Globulin 3.7 (H) 2.3 - 3.5 g/dL    A-G Ratio 1.1 (L) 1.2 - 3.5     LIPID PANEL    Collection Time: 03/16/20  5:16 AM   Result Value Ref Range    LIPID PROFILE          Cholesterol, total 136 MG/DL    Triglyceride 35 35 - 150 MG/DL    HDL Cholesterol 59 40 - 60 MG/DL    LDL, calculated 70 <100 MG/DL    VLDL, calculated 7 6.0 - 23.0 MG/DL    CHOL/HDL Ratio 2.3 <200     HEMOGLOBIN A1C WITH EAG    Collection Time: 03/16/20  5:16 AM   Result Value Ref Range    Hemoglobin A1c 5.4 %    Est. average glucose 108 mg/dL       Current Med List in Hospital:   Current Facility-Administered Medications   Medication Dose Route Frequency    atorvastatin (LIPITOR) tablet 10 mg  10 mg Oral QHS    clopidogreL (PLAVIX) tablet 300 mg  300 mg Oral ONCE    [START ON 3/17/2020] clopidogreL (PLAVIX) tablet 75 mg  75 mg Oral DAILY    sodium chloride (NS) flush 5-40 mL  5-40 mL IntraVENous Q8H    sodium chloride (NS) flush 5-40 mL  5-40 mL IntraVENous PRN    aspirin chewable tablet 81 mg  81 mg Oral DAILY    acetaminophen (TYLENOL) tablet 650 mg  650 mg Oral Q4H PRN    ondansetron (ZOFRAN) injection 4 mg  4 mg IntraVENous Q4H PRN    metoprolol succinate (TOPROL-XL) XL tablet 37.5 mg  37.5 mg Oral DAILY    ALPRAZolam (XANAX) tablet 0.25 mg  0.25 mg Oral TID PRN       Discharge Exam:  Patient Vitals for the past 24 hrs:   Temp Pulse Resp BP SpO2   03/16/20 0722 96.7 °F (35.9 °C) 84 18 110/55 98 %   03/16/20 0453 97.8 °F (36.6 °C) 84 18 100/64 95 %   03/15/20 2240 97.9 °F (36.6 °C) 82 18 105/69 98 %   03/15/20 2013 98.4 °F (36.9 °C) 97 18 111/59 96 %   03/15/20 1851 96.8 °F (36 °C) 82 14 138/61 98 %   03/15/20 1807  88 10 121/77 98 %   03/15/20 1747  95 17 150/76 100 %   03/15/20 1727  86 8 133/73 99 %   03/15/20 1708  85 10 132/70 100 %   03/15/20 1643  87 10 151/72 100 %   03/15/20 1635  93 11 138/76 100 %   03/15/20 1605 97.3 °F (36.3 °C) (!) 113 17 125/69 100 %     Oxygen Therapy  O2 Sat (%): 98 % (03/16/20 0722)  Pulse via Oximetry: 89 beats per minute (03/15/20 1807)  O2 Device: Room air (03/15/20 1605)    Estimated body mass index is 20.98 kg/m² as calculated from the following:    Height as of this encounter: 5' 8\" (1.727 m). Weight as of this encounter: 62.6 kg (138 lb). No intake or output data in the 24 hours ending 03/16/20 1101    *Note that automatically entered I/Os may not be accurate; dependent on patient compliance with collection and accurate  by assistants. General:    Well nourished. Alert. Eyes:   Normal sclerae. Extraocular movements intact. ENT:  Normocephalic, atraumatic. Moist mucous membranes  CV:   Regular rate and rhythm. No murmur, rub, or gallop. Lungs:  Clear to auscultation bilaterally. No wheezing, rhonchi, or rales. Abdomen: Soft, nontender, nondistended. Extremities: Warm and dry. No cyanosis or edema. Neurologic: CN II-XII grossly intact. No gross focal deficits   Skin:     No rashes or jaundice. Psych:  Normal mood and affect.       Discharge Info:   Current Discharge Medication List START taking these medications    Details   aspirin 81 mg chewable tablet Take 1 Tab by mouth daily. Qty: 30 Tab, Refills: 0      atorvastatin (LIPITOR) 10 mg tablet Take 1 Tab by mouth nightly. Qty: 30 Tab, Refills: 0      clopidogreL (PLAVIX) 75 mg tab Take 1 Tab by mouth daily. Qty: 21 Tab, Refills: 0         CONTINUE these medications which have NOT CHANGED    Details   ALPRAZolam (Xanax) 0.25 mg tablet Take 0.25 mg by mouth as needed for Anxiety. metoprolol succinate (TOPROL-XL) 25 mg XL tablet Take 25 mg by mouth daily. Time spent in patient discharge planning and coordination 35 minutes.     Signed:  Rafael Bautista MD

## 2020-03-16 NOTE — PROGRESS NOTES
Xanax 0.25 mg po given. Patient given a Patient Stroke Education book. Patient educated on signs and symptoms of stroke and importance of getting to nearest ED as soon as symptoms occur. Patient educated on risk factors of stroke. Patient refused Lipitor stating she needs read up on medication more before she agrees to take it.

## 2020-03-16 NOTE — CONSULTS
I saw the patient via computer. I did not bill for this encounter. The patient had a 10 to 15-minute episode of unilateral sensory changes in the hand associated with numbness around the lips. It self resolved. Brain MRI is normal.  Otherwise, the patient has a sense of generalized weakness. 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 ASA 81 mg daily    Cholesterol is pretty good. The patient has generalized soreness. I would decrease atorvastatin to 10 mg.        Follow-up with me as an outpatient

## 2020-03-16 NOTE — PROGRESS NOTES
Patient alert and oriented x 4. Slight numbness to left side of face and leg. Denies any pain. Patient has increased anxiety. Remote telemetry on and functioning properly. Lung sounds clear. Spouse at bed side. Side rails up x 2. Bed in low position. Call light in reach. Instructed to call with any needs, patient verbalized understanding.

## 2020-03-16 NOTE — PROGRESS NOTES
Problem: Falls - Risk of  Goal: *Absence of Falls  Description: Document Guzman Liriano Fall Risk and appropriate interventions in the flowsheet.   Outcome: Progressing Towards Goal  Note: Fall Risk Interventions:  Mobility Interventions: Patient to call before getting OOB, PT Consult for mobility concerns         Medication Interventions: Patient to call before getting OOB, Evaluate medications/consider consulting pharmacy    Elimination Interventions: Call light in reach, Patient to call for help with toileting needs, Toileting schedule/hourly rounds              Problem: Patient Education: Go to Patient Education Activity  Goal: Patient/Family Education  Outcome: Progressing Towards Goal     Problem: TIA/CVA Stroke: 0-24 hours  Goal: Activity/Safety  Outcome: Progressing Towards Goal  Goal: Diagnostic Test/Procedures  Outcome: Progressing Towards Goal  Goal: Nutrition/Diet  Outcome: Progressing Towards Goal  Goal: Discharge Planning  Outcome: Progressing Towards Goal  Goal: Medications  Outcome: Progressing Towards Goal  Goal: Respiratory  Outcome: Progressing Towards Goal  Goal: Treatments/Interventions/Procedures  Outcome: Progressing Towards Goal  Goal: Psychosocial  Outcome: Progressing Towards Goal  Goal: *Hemodynamically stable  Outcome: Progressing Towards Goal  Goal: *Neurologically stable  Description: Absence of additional neurological deficits    Outcome: Progressing Towards Goal  Goal: *Verbalizes anxiety and depression are reduced or absent  Outcome: Progressing Towards Goal  Goal: *Absence of deep venous thrombosis signs and symptoms(Stroke Metric)  Outcome: Progressing Towards Goal  Goal: *Ability to perform ADLs and demonstrates progressive mobility and function  Outcome: Progressing Towards Goal  Goal: *Stroke education started(Stroke Metric)  Outcome: Progressing Towards Goal  Goal: *Dysphagia screen performed(Stroke Metric)  Outcome: Progressing Towards Goal     Problem: Anxiety  Goal: *Alleviation of anxiety  Outcome: Progressing Towards Goal  Goal: *Alleviation of anxiety (Palliative Care)  Outcome: Progressing Towards Goal

## 2020-03-16 NOTE — PROGRESS NOTES
Received pt from RN Juventino Torre) in stable condition. Pt in bed resting quietly. Resp even & unlabored on room air; no acute signs of distress noted. Dual NIH performed. Bed low & locked; call light in reach; no needs voiced.  at bedside.

## 2020-03-16 NOTE — DISCHARGE INSTRUCTIONS
Patient Education        Transient Ischemic Attack: Care Instructions  Your Care Instructions    A transient ischemic attack (TIA) is when blood flow to a part of your brain is blocked for a short time. A TIA is like a stroke but usually lasts only a few minutes. A TIA does not cause lasting brain damage. Any vision problems, slurred speech, or other symptoms usually go away in 10 to 20 minutes. But they may last for up to 24 hours. TIAs are often warning signs of a stroke. Some people who have a TIA may have a stroke in the future. A stroke can cause symptoms like those of a TIA. But a stroke causes lasting damage to your brain. You can take steps to help prevent a stroke. One thing you can do is get early treatment. If you have other new symptoms, or if your symptoms do not get better, go back to the emergency room or call your doctor right away. Getting treatment right away may prevent long-term brain damage caused by a stroke. The doctor has checked you carefully, but problems can develop later. If you notice any problems or new symptoms, get medical treatment right away. Follow-up care is a key part of your treatment and safety. Be sure to make and go to all appointments, and call your doctor if you are having problems. It's also a good idea to know your test results and keep a list of the medicines you take. How can you care for yourself at home? Medicines    · Be safe with medicines. Take your medicines exactly as prescribed. Call your doctor if you think you are having a problem with your medicine.     · If you take a blood thinner, such as aspirin, be sure you get instructions about how to take your medicine safely.  Blood thinners can cause serious bleeding problems.     · Call your doctor if you are not able to take your medicines for any reason.     · Do not take any over-the-counter medicines or herbal products without talking to your doctor first.     · If you take birth control pills or hormone therapy, talk to your doctor. Ask if these treatments are right for you.    Lifestyle changes    · Do not smoke. If you need help quitting, talk to your doctor about stop-smoking programs and medicines.     · Be active. If your doctor recommends it, get more exercise. Walking is a good choice. Bit by bit, increase the amount you walk every day. Try for at least 30 minutes on most days of the week. You also may want to swim, bike, or do other activities.     · Eat heart-healthy foods. These include fruits, vegetables, high-fiber foods, lean meats, beans, peas, nuts, seeds, and soy products, and foods that are low in sodium, saturated fat, and trans fat.     · Stay at a healthy weight. Lose weight if you need to.     · Limit alcohol to 2 drinks a day for men and 1 drink a day for women.    Staying healthy    · Manage other health problems such as diabetes, high blood pressure, and high cholesterol.     · Get the flu vaccine every year. When should you call for help? Call 911 anytime you think you may need emergency care. For example, call if:    · You have new or worse symptoms of a stroke. These may include:  ? Sudden numbness, tingling, weakness, or loss of movement in your face, arm, or leg, especially on only one side of your body. ? Sudden vision changes. ? Sudden trouble speaking. ? Sudden confusion or trouble understanding simple statements. ? Sudden problems with walking or balance. ? A sudden, severe headache that is different from past headaches. Call  911 even if these symptoms go away in a few minutes.     · You feel like you are having another TIA.    Watch closely for changes in your health, and be sure to contact your doctor if you have any problems. Where can you learn more? Go to http://zoya-tamanna.info/  Enter I231 in the search box to learn more about \"Transient Ischemic Attack: Care Instructions. \"  Current as of: July 14, 2019Content Version: 12.4  © 1763-4592 [employer]. Care instructions adapted under license by [employer] (which disclaims liability or warranty for this information). If you have questions about a medical condition or this instruction, always ask your healthcare professional. [address] any warranty or liability for your use of this information.

## 2020-03-16 NOTE — PROGRESS NOTES
Problem: Mobility Impaired (Adult and Pediatric)  Goal: *Acute Goals and Plan of Care (Insert Text)  Description: STG:  (1.)Ms. Herlinda Lane will move from supine to sit and sit to supine  with MODIFIED INDEPENDENCE within 1-3 treatment day(s). (2.)Ms. Herlinda Lane will transfer from bed to chair and chair to bed with SUPERVISION using the least restrictive device within 1-3 treatment day(s). (3.)Ms. Herlinda Lane will ambulate with SUPERVISION for 200 feet with the least restrictive device within 1-3 treatment day(s). (4.) Ms. Herlinda Lane will be able to ascend/descend 8 steps with L railing with CGA in 1-3 days. ________________________________________________________________________________________________   Outcome: Progressing Towards Goal     PHYSICAL THERAPY: Initial Assessment 3/16/2020  OBSERVATION: PT Visit Days : 1  Payor: Fernando Trevizo / Plan: [facility] / Product Type: PPO /       NAME/AGE/GENDER: Baldo Zavala is a 78 y.o. female   PRIMARY DIAGNOSIS: TIA (transient ischemic attack) [G45.9] Left sided numbness Left sided numbness        ICD-10: Treatment Diagnosis:    Generalized Muscle Weakness (M62.81)  Difficulty in walking, Not elsewhere classified (R26.2)   Precaution/Allergies:  Ciprofloxacin and Erythromycin      ASSESSMENT:     Ms. Herlinda Lane came to ER due to L distal LE numbness and L facial twitch. These symptoms have resolved per patient. She presents supine in bed- had taken something for anxiety. She transferred to EOB with SBA. She stood and reported she was dizzy. Returned to siting and BP taken 104/66 HR 93 , she stood again  BP 85/59 . She reported she has been dizzy and fatigued since having a steroid shot for her migraines almost two weeks ago. She also reported she is having visual disturbance--\" halogram\" of a person is what she reports she is seeing. She ambulated 150' with chair pushed behind her. Had to sit due to complaints of LE weakness. Poor endurance. She lives with her , 3year old grand daughter and [de-identified]year old mother. She declined Home health PT services. Will follow her in the acute care setting. This section established at most recent assessment   PROBLEM LIST (Impairments causing functional limitations):  Decreased Strength  Decreased ADL/Functional Activities  Decreased Transfer Abilities  Decreased Ambulation Ability/Technique  Decreased Activity Tolerance  Increased Fatigue   INTERVENTIONS PLANNED: (Benefits and precautions of physical therapy have been discussed with the patient.)  Gait Training  Therapeutic Activites  Therapeutic Exercise/Strengthening  Transfer Training     TREATMENT PLAN: Frequency/Duration: daily for duration of hospital stay  Rehabilitation Potential For Stated Goals: Good     REHAB RECOMMENDATIONS (at time of discharge pending progress):    Placement: It is my opinion, based on this patient's performance to date, that Ms. Kemal Rowe may benefit from participating in 1-2 additional therapy sessions in order to continue to assess for rehab potential and then make recommendation for disposition at discharge. Would benefit from HHPT but is not interested. Equipment:   None at this time              HISTORY:   History of Present Injury/Illness (Reason for Referral): PER MD note:  Mrs. Kemal Rowe is a 79 y/o female with a h/o anxiety p/w acute onset numbness of the LUE distal to the elbow that started around 0800 today. This lasted approximately 10 minutes then she noticed some numbness to the left side of her lower lip which last a few more minutes. Symptoms resolved, however her symptoms returned around 3pm. She has a long-standing history of palpitations and was put on Toprol nearly 15 years ago but has never been diagnosed with AFib or flutter. Lately she's been having more frequent episodes of palpitations a/w SOB, but no chest pain, orthopnea, edema or chest pain.  Saw her doctor earlier this month for a migraine and was given a shot of steroids. She then developed hallucinations and went to the ER at [place] on 3/6 and had a head CT that showed only a remote left lacunar infarct. She was ultimately discharged home. Currently her symptoms have resolved and NIH 0. Otherwise she denies headache, visual changes, hallucinations, N/V/D, abdominal pain, fevers. VS and labs are normal. Head CT with old left lacunar infarct, similar to CT on 3/6. Seen by tele-neurology and recommend stroke/TIA work up. Hospitalist consulted for admission and further management. Past Medical History/Comorbidities:   Ms. Ab Malik  has a past medical history of Anxiety, Ill-defined condition, and Palpitations. Ms. Ab Malik  has a past surgical history that includes hx cholecystectomy; hx hysterectomy; and hx lumbar laminectomy. Social History/Living Environment:   Home Environment: Private residence(split level)  # Steps to Enter: (2)  One/Two Story Residence: Split level(8 steps then 5 steps left)  # of Interior Steps: 8  Interior Rails: Left  Living Alone: No  Support Systems: Spouse/Significant Other/Partner([de-identified]year old mother and 3year old grand daughter live with h)  Patient Expects to be Discharged to[de-identified] Private residence  Current DME Used/Available at Home: None  Tub or Shower Type: Shower  Prior Level of Function/Work/Activity:  independent     Number of Personal Factors/Comorbidities that affect the Plan of Care: 1-2: MODERATE COMPLEXITY   EXAMINATION:   Most Recent Physical Functioning:   Gross Assessment:  AROM: Within functional limits  Strength: Within functional limits  Coordination: Within functional limits  Sensation: ( decreased L LE since LB surgery)               Posture:     Balance:  Sitting: Intact  Standing: With support; Without support Bed Mobility:  Supine to Sit: Stand-by assistance  Sit to Supine: Stand-by assistance  Scooting: Stand-by assistance  Wheelchair Mobility:     Transfers:  Sit to Stand: Stand-by assistance;Contact guard assistance  Stand to Sit: Stand-by assistance;Contact guard assistance  Bed to Chair: Stand-by assistance;Contact guard assistance  Gait:     Speed/Rosmery: Slow  Gait Abnormalities: (unsteady)  Distance (ft): 150 Feet (ft)(pulled chair behind her)  Assistive Device: Gait belt  Ambulation - Level of Assistance: Stand-by assistance;Contact guard assistance  Interventions: Manual cues; Safety awareness training;Verbal cues      Body Structures Involved:  Nerves Body Functions Affected:  Neuromusculoskeletal  Movement Related Activities and Participation Affected:  General Tasks and Demands  Mobility   Number of elements that affect the Plan of Care: 3: MODERATE COMPLEXITY   CLINICAL PRESENTATION:   Presentation: Evolving clinical presentation with changing clinical characteristics: MODERATE COMPLEXITY   CLINICAL DECISION MAKIN [facility] Inpatient Short Form  How much difficulty does the patient currently have. .. Unable A Lot A Little None   1. Turning over in bed (including adjusting bedclothes, sheets and blankets)? [] 1   [] 2   [] 3   [x] 4   2. Sitting down on and standing up from a chair with arms ( e.g., wheelchair, bedside commode, etc.)   [] 1   [] 2   [x] 3   [] 4   3. Moving from lying on back to sitting on the side of the bed? [] 1   [] 2   [x] 3   [] 4   How much help from another person does the patient currently need. .. Total A Lot A Little None   4. Moving to and from a bed to a chair (including a wheelchair)? [] 1   [] 2   [x] 3   [] 4   5. Need to walk in hospital room? [] 1   [] 2   [x] 3   [] 4   6. Climbing 3-5 steps with a railing? [] 1   [] 2   [x] 3   [] 4   © 2007, Trustees of [employer], under license to [employer].  All rights reserved      Score:  Initial: 19 Most Recent: X (Date: -- )    Interpretation of Tool:  Represents activities that are increasingly more difficult (i.e. Bed mobility, Transfers, Gait).    Medical Necessity:     Patient demonstrates   good   rehab potential due to higher previous functional level. Reason for Services/Other Comments:  Patient   continues to require present interventions due to patient's inability to safely and independently perform functional mobility    . Use of outcome tool(s) and clinical judgement create a POC that gives a: Clear prediction of patient's progress: LOW COMPLEXITY            TREATMENT:   (In addition to Assessment/Re-Assessment sessions the following treatments were rendered)   Pre-treatment Symptoms/Complaints:  no pain; weakness and lightheadedness  Pain: Initial:   Pain Intensity 1: 0  Post Session:  0     Assessment/Reassessment only, no treatment provided today    Braces/Orthotics/Lines/Etc:   O2 Device: Room air  Treatment/Session Assessment:    Response to Treatment:  tolerated fair; poor endurance  Interdisciplinary Collaboration:   Physical Therapist  Occupational Therapist  Registered Nurse    After treatment position/precautions:   Supine in bed  Bed/Chair-wheels locked  Bed in low position  Call light within reach  RN notified  Family at bedside  Side rails x 2   Compliance with Program/Exercises: Will assess as treatment progresses  Recommendations/Intent for next treatment session: \"Next visit will focus on advancements to more challenging activities\".   Total Treatment Duration:  PT Patient Time In/Time Out  Time In: 1130  Time Out: [address]

## 2020-03-16 NOTE — PROGRESS NOTES
Care Management Interventions  PCP Verified by CM: Yes  Transition of Care Consult (CM Consult): Discharge Planning  Physical Therapy Consult: Yes  Occupational Therapy Consult: Yes  Current Support Network: Own Home, Lives with Spouse  Confirm Follow Up Transport: Family  Discharge Location  Discharge Placement: Home        Per MD pt stable for d/c. Pt was admitted for CVA/TIA. Per d/c summary it was determined pt had a TIA. SW spoke with pt who is A&O, indep with ADL'S. Spouse at bedside. Pt was evaluated by PT and OT who state pt would benefit from [facility] . Pt was offered [facility] services but pt declined, stating she did not need. Pt did not voice any other needs.

## 2020-03-16 NOTE — PROGRESS NOTES
Problem: Self Care Deficits Care Plan (Adult)  Goal: *Acute Goals and Plan of Care (Insert Text)  Description: 1. Patient will perform grooming with supervision. 2. Patient will perform Upper body dressing with supervision  3. Patient will perform lower body dressing with supervision  4. Patient will perform upper and lower body bathing with supervision. 5. Patient will perform toilet transfers with SBA  6. Patient will perform shower transfer with SBA  7. Patient will participate in 30 + minutes of ADL/ therapeutic exercise/therapeutic activity with min rest breaks to increase activity tolerance for self care. 8. Patient will perform ADL functional mobility in room with SBA    Goals to be achieved in 7 days. Outcome: Progressing Towards Goal     OCCUPATIONAL THERAPY: Initial Assessment and AM 3/16/2020  OBSERVATION: OT Visit Days: 1  Payor: Sarah Valdez / Plan: [address] / Product Type: PPO /      NAME/AGE/GENDER: Connie Bashir is a 78 y.o. female   PRIMARY DIAGNOSIS:  TIA (transient ischemic attack) [G45.9] Left sided numbness Left sided numbness        ICD-10: Treatment Diagnosis:    Generalized Muscle Weakness (M62.81)  Other lack of cordination (R27.8)  Difficulty in walking, Not elsewhere classified (R26.2)   Precautions/Allergies:     Ciprofloxacin and Erythromycin      ASSESSMENT:     Ms. Gonzalez Smoker presents with above diagnosis. She came to ER due to L distal LE numbness and L facial twitch. She presents supine in bed had taken something for anxiety. She transferred to EOB with SBA. She donned socks without difficulty. Her B UE at symmetrical and grossly 4+/5 strength  with no odd sensation. All her symptoms have resolved. She stood and reported she was dizzy. Returned to siting and BP taken 104/66 HR 93 , she stood with BP 85/59 . She reported she has been dizzy and fatigued since having a steroid shot for her migraines almost two weeks ago.  She also reported she is having visual disturbance\" halogram\" is what she is seeing. She was able read the board without difficulty while sitting on EOB. She ambulated with PT and appears weak with decreased endurance. She lives with her , 3year old grand daughter and [de-identified]year old mother. She declined Home health OT services. Will follow her in the acute care setting. This section established at most recent assessment   PROBLEM LIST (Impairments causing functional limitations):  Decreased Strength  Decreased ADL/Functional Activities  Decreased Transfer Abilities  Decreased Ambulation Ability/Technique  Decreased Balance   INTERVENTIONS PLANNED: (Benefits and precautions of occupational therapy have been discussed with the patient.)  Activities of daily living training  Adaptive equipment training  Balance training  Clothing management  Therapeutic activity  Therapeutic exercise     TREATMENT PLAN: Frequency/Duration: Follow patient 2 times to address above goals. Rehabilitation Potential For Stated Goals: Good     REHAB RECOMMENDATIONS (at time of discharge pending progress):    Placement: It is my opinion, based on this patient's performance to date, that Ms. Raj Nunez may benefit from  home health but declined services at this time  Equipment:   None at this time              Jemma 86:   History of Present Injury/Illness (Reason for Referral):  See H and P  Past Medical History/Comorbidities:   Ms. Raj Nunez  has a past medical history of Anxiety, Ill-defined condition, and Palpitations. Ms. Raj Nunez  has a past surgical history that includes hx cholecystectomy; hx hysterectomy; and hx lumbar laminectomy.   Social History/Living Environment:   Home Environment: Private residence(split level)  # Steps to Enter: (2)  One/Two Story Residence: Split level(8 steps then 5 steps left)  # of Interior Steps: 8  Interior Rails: Left  Living Alone: No  Support Systems: Spouse/Significant Other/Partner([de-identified]year old mother and 3year old grand daughter live with h)  Patient Expects to be Discharged to[de-identified] Private residence  Current DME Used/Available at Home: None  Tub or Shower Type: Shower  Prior Level of Function/Work/Activity:  independent     Number of Personal Factors/Comorbidities that affect the Plan of Care: Brief history (0):  LOW COMPLEXITY   ASSESSMENT OF OCCUPATIONAL PERFORMANCE[de-identified]   Activities of Daily Living:   Basic ADLs (From Assessment) Complex ADLs (From Assessment)   Feeding: Supervision  Oral Facial Hygiene/Grooming: Supervision  Bathing: Stand-by assistance, Contact guard assistance  Upper Body Dressing: Supervision  Lower Body Dressing: Stand-by assistance, Contact guard assistance  Toileting: Stand by assistance, Contact guard assistance     Grooming/Bathing/Dressing Activities of Daily Living     Cognitive Retraining  Safety/Judgement: Awareness of environment; Fall prevention; Insight into deficits                       Bed/Mat Mobility  Supine to Sit: Stand-by assistance  Sit to Supine: Stand-by assistance  Sit to Stand: Stand-by assistance;Contact guard assistance  Stand to Sit: Stand-by assistance;Contact guard assistance  Bed to Chair: Stand-by assistance;Contact guard assistance  Scooting: Stand-by assistance     Most Recent Physical Functioning:   Gross Assessment:                  Posture:     Balance:  Sitting: Intact  Standing: With support; Without support Bed Mobility:  Supine to Sit: Stand-by assistance  Sit to Supine: Stand-by assistance  Scooting: Stand-by assistance  Wheelchair Mobility:     Transfers:  Sit to Stand: Stand-by assistance;Contact guard assistance  Stand to Sit: Stand-by assistance;Contact guard assistance  Bed to Chair: Stand-by assistance;Contact guard assistance            Patient Vitals for the past 6 hrs:   BP BP Patient Position SpO2 Pulse   03/16/20 0722 110/55 At rest 98 % 84   03/16/20 1115 104/66 -- -- 93   03/16/20 1130 (!) 85/59 -- -- (!) 115       Mental Status  Neurologic State: Alert, Appropriate for age  Orientation Level: Oriented X4  Cognition: Follows commands, Appropriate decision making  Perception: Appears intact  Perseveration: No perseveration noted  Safety/Judgement: Awareness of environment, Fall prevention, Insight into deficits                          Physical Skills Involved:  Strength  Activity Tolerance Cognitive Skills Affected (resulting in the inability to perform in a timely and safe manner):  none  Psychosocial Skills Affected:  Self-Awareness   Number of elements that affect the Plan of Care: 1-3:  LOW COMPLEXITY   CLINICAL DECISION MAKIN[address] [place] [address] AM-PAC 6 Clicks   Daily Activity Inpatient Short Form  How much help from another person does the patient currently need. .. Total A Lot A Little None   1. Putting on and taking off regular lower body clothing? [] 1   [] 2   [x] 3   [] 4   2. Bathing (including washing, rinsing, drying)? [] 1   [] 2   [x] 3   [] 4   3. Toileting, which includes using toilet, bedpan or urinal?   [] 1   [] 2   [x] 3   [] 4   4. Putting on and taking off regular upper body clothing? [] 1   [] 2   [] 3   [x] 4   5. Taking care of personal grooming such as brushing teeth? [] 1   [] 2   [] 3   [x] 4   6. Eating meals? [] 1   [] 2   [] 3   [x] 4   © , Trustees of [address] [place] [address], under license to [employer]. All rights reserved      Score:  Initial: 21 Most Recent: X (Date: -- )    Interpretation of Tool:  Represents activities that are increasingly more difficult (i.e. Bed mobility, Transfers, Gait).   Medical Necessity:     · Patient is expected to demonstrate progress in   · Self care skills and functional mobility  ·     Reason for Services/Other Comments:  · Patient continues to require skilled intervention due to   · Above listed deficits     Use of outcome tool(s) and clinical judgement create a POC that gives a:          TREATMENT:   (In addition to Assessment/Re-Assessment sessions the following treatments were rendered)     Pre-treatment Symptoms/Complaints:    Pain: Initial:   Pain Intensity 1: 2  Pain Location 1: Hip  Pain Orientation 1: Right  Pain Intervention(s) 1: Ambulation/Increased Activity  Post Session:  0/10     Assessment/Reassessment only, no treatment provided today    Braces/Orthotics/Lines/Etc:   O2 Device: Room air  Treatment/Session Assessment:    Response to Treatment:  tolerated well, drowsy form anxiety meds  Interdisciplinary Collaboration:   Physical Therapist  Occupational Therapist  Registered Nurse    After treatment position/precautions:   Supine in bed  Bed/Chair-wheels locked  Bed in low position  Call light within reach  RN notified  Family at bedside  Side rails x 3   Compliance with Program/Exercises: Compliant all of the time. Recommendations/Intent for next treatment session: \"Next visit will focus on advancements to more challenging activities and reduction in assistance provided\".   Total Treatment Duration:  OT Patient Time In/Time Out  Time In: 1115  Time Out: 9152 [facility],

## 2020-03-16 NOTE — PROGRESS NOTES
Discharge instructions & prescription given to pt. Verbalized understanding. IV removed. Tip intact. Opportunity for questions provided. Instructed pt to call when ready to be taken down.

## 2020-03-16 NOTE — PROGRESS NOTES
Patient takes 0.25 mg of Xanax as needed at home and is requesting to be ordered here since she feels like her anxiety is increasing. Dr. Lion Camara notified, order received.

## 2020-03-17 LAB — ANA SER QL: NEGATIVE

## 2021-05-26 ENCOUNTER — TRANSCRIBE ORDER (OUTPATIENT)
Dept: SCHEDULING | Age: 71
End: 2021-05-26

## 2021-05-26 DIAGNOSIS — Z78.0 ASYMPTOMATIC MENOPAUSE: Primary | ICD-10-CM

## 2021-05-26 DIAGNOSIS — Z12.31 ENCOUNTER FOR SCREENING MAMMOGRAM FOR BREAST CANCER: Primary | ICD-10-CM

## 2021-07-27 ENCOUNTER — HOSPITAL ENCOUNTER (OUTPATIENT)
Dept: MAMMOGRAPHY | Age: 71
Discharge: HOME | End: 2021-07-27
Attending: NURSE PRACTITIONER
Payer: COMMERCIAL

## 2021-07-27 DIAGNOSIS — Z12.31 ENCOUNTER FOR SCREENING MAMMOGRAM FOR BREAST CANCER: ICD-10-CM

## 2021-07-27 DIAGNOSIS — Z78.0 ASYMPTOMATIC MENOPAUSE: ICD-10-CM

## 2021-07-27 PROCEDURE — 77063 BREAST TOMOSYNTHESIS BI: CPT

## 2021-07-27 PROCEDURE — 77080 DXA BONE DENSITY AXIAL: CPT

## 2021-08-06 ENCOUNTER — HOSPITAL ENCOUNTER (OUTPATIENT)
Dept: MRI IMAGING | Age: 71
Discharge: HOME | End: 2021-08-06
Attending: PSYCHIATRY & NEUROLOGY
Payer: COMMERCIAL

## 2021-08-06 DIAGNOSIS — G89.29 CHRONIC NECK PAIN WITH ABNORMAL NEUROLOGIC EXAMINATION: ICD-10-CM

## 2021-08-06 DIAGNOSIS — M54.2 CHRONIC NECK PAIN WITH ABNORMAL NEUROLOGIC EXAMINATION: ICD-10-CM

## 2021-08-06 DIAGNOSIS — R29.2 HYPERREFLEXIA: ICD-10-CM

## 2021-08-06 PROCEDURE — 72141 MRI NECK SPINE W/O DYE: CPT

## 2022-02-16 PROBLEM — L81.9 DISCOLORATION OF SKIN OF TOE: Status: ACTIVE | Noted: 2022-02-16

## 2022-03-18 PROBLEM — G45.9 TIA (TRANSIENT ISCHEMIC ATTACK): Status: ACTIVE | Noted: 2020-03-15

## 2022-03-18 PROBLEM — I63.81 LEFT SIDED LACUNAR INFARCTION (HCC): Status: ACTIVE | Noted: 2020-03-15

## 2022-03-19 PROBLEM — F41.9 ANXIETY: Status: ACTIVE | Noted: 2020-03-15

## 2022-03-19 PROBLEM — R00.2 PALPITATIONS: Status: ACTIVE | Noted: 2020-03-15

## 2022-03-19 PROBLEM — R20.0 LEFT SIDED NUMBNESS: Status: ACTIVE | Noted: 2020-03-15

## 2022-03-19 PROBLEM — L81.9 DISCOLORATION OF SKIN OF TOE: Status: ACTIVE | Noted: 2022-02-16

## 2022-06-02 ENCOUNTER — PROCEDURE VISIT (OUTPATIENT)
Dept: NEUROLOGY | Age: 72
End: 2022-06-02

## 2022-06-02 VITALS
HEART RATE: 82 BPM | DIASTOLIC BLOOD PRESSURE: 83 MMHG | SYSTOLIC BLOOD PRESSURE: 101 MMHG | BODY MASS INDEX: 22.88 KG/M2 | WEIGHT: 151 LBS | HEIGHT: 68 IN

## 2022-06-02 DIAGNOSIS — G20 PARKINSON DISEASE (HCC): Primary | ICD-10-CM

## 2022-06-02 PROCEDURE — 11105 PUNCH BX SKIN EA SEP/ADDL: CPT | Performed by: PSYCHIATRY & NEUROLOGY

## 2022-06-02 PROCEDURE — 11104 PUNCH BX SKIN SINGLE LESION: CPT | Performed by: PSYCHIATRY & NEUROLOGY

## 2022-06-02 NOTE — PROGRESS NOTES
Skin Biopsy Procedure Note:       Indication: Evaluation for Parkinson's disease spectrum. Consent: Written consent was completed        The patient was placed on an examining table to the right side. Total 3 biopsy sites. The left lower limb- The first site was lateral lower leg 10 cm above the lateral malleolus. The second site the left lateral lower thigh 10 cm above the knee. The third site the left posterior cervical 3 cm laterally from C7 vertebral process. Each site was steriled with Betadine swabs x 3, and then alcohol patch x 1.  2% Lidocaine was injected intra and subcutaneously in V shape to each site. The biopsy punch and other instrument were provided by [employer]. About 3 mm diameter and 7-8 mm depth of dermis/ epidermis was sampled from the above sites, and each placed into individual solution of a test tube provided by [employer]. The label of each tube was correctly matched with the sample. Minimal bleeding was easily stopped by pressure with sterile Quikclot 4 x 4, then the each biopsy site was covered a wide Band-Aid. Patient tolerated the procedure well. Postprocedural instruction was given. Patient was advised to call for any discomfort related to the procedure.

## 2022-09-09 ENCOUNTER — OFFICE VISIT (OUTPATIENT)
Dept: NEUROLOGY | Age: 72
End: 2022-09-09
Payer: MEDICARE

## 2022-09-09 VITALS
SYSTOLIC BLOOD PRESSURE: 118 MMHG | DIASTOLIC BLOOD PRESSURE: 82 MMHG | WEIGHT: 149 LBS | HEART RATE: 58 BPM | HEIGHT: 68 IN | BODY MASS INDEX: 22.58 KG/M2

## 2022-09-09 DIAGNOSIS — G20 PARKINSON'S DISEASE (HCC): Primary | ICD-10-CM

## 2022-09-09 DIAGNOSIS — G89.29 CHRONIC NECK PAIN: ICD-10-CM

## 2022-09-09 DIAGNOSIS — M54.2 CHRONIC NECK PAIN: ICD-10-CM

## 2022-09-09 DIAGNOSIS — G25.2 RESTING TREMOR: ICD-10-CM

## 2022-09-09 DIAGNOSIS — Z86.73 HISTORY OF TIA (TRANSIENT ISCHEMIC ATTACK): ICD-10-CM

## 2022-09-09 PROCEDURE — 3017F COLORECTAL CA SCREEN DOC REV: CPT | Performed by: PSYCHIATRY & NEUROLOGY

## 2022-09-09 PROCEDURE — 99214 OFFICE O/P EST MOD 30 MIN: CPT | Performed by: PSYCHIATRY & NEUROLOGY

## 2022-09-09 PROCEDURE — G8428 CUR MEDS NOT DOCUMENT: HCPCS | Performed by: PSYCHIATRY & NEUROLOGY

## 2022-09-09 PROCEDURE — G8399 PT W/DXA RESULTS DOCUMENT: HCPCS | Performed by: PSYCHIATRY & NEUROLOGY

## 2022-09-09 PROCEDURE — 1090F PRES/ABSN URINE INCON ASSESS: CPT | Performed by: PSYCHIATRY & NEUROLOGY

## 2022-09-09 PROCEDURE — 1123F ACP DISCUSS/DSCN MKR DOCD: CPT | Performed by: PSYCHIATRY & NEUROLOGY

## 2022-09-09 PROCEDURE — 1036F TOBACCO NON-USER: CPT | Performed by: PSYCHIATRY & NEUROLOGY

## 2022-09-09 PROCEDURE — G8420 CALC BMI NORM PARAMETERS: HCPCS | Performed by: PSYCHIATRY & NEUROLOGY

## 2022-09-09 NOTE — PROGRESS NOTES
Negative for constipation. Genitourinary:  Negative for dysuria. Musculoskeletal:  Positive for arthralgias and neck pain. Skin:  Negative for rash. Allergic/Immunologic: Negative for immunocompromised state. Neurological:  Positive for tremors. Psychiatric/Behavioral:  The patient is nervous/anxious. Lab/Imaging Review:   I REVIEWED PERTINENT LABS, IMAGES, AND REPORTS WITH THE PATIENT PERSONALLY, DIRECTLY AND FULLY. 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 There is reduced intraepidermal nerve fiber density consistent with a small fiber neuropathy. There is no pathologic evidence of amyloid deposition in cutaneous nerves. MRI Cervical Spine August 2021:  Findings: The cervical vertebral bodies are normal in height and signal. There is multilevel degenerative disc signal with disc space narrowing noted at C4-C5, C5-C6 and C6-C7. The cervical spinal cord is normal in signal throughout. The craniocervical junction and imaged posterior fossa are unremarkable. There is no prevertebral or posterior soft tissue edema. C2-C3: No central stenosis or foramina narrowing. C3-C4: There is no central stenosis or foramina narrowing. C4-C5: There is a shallow broad-based disc osteophyte complex without central stenosis or foramina narrowing. C5-C6: There is bulky broad-based disc osteophyte complex present. This results in mild central spinal stenosis. The foramina are patent. C6-C7: There is a broad-based slightly eccentric to the left disc osteophyte complex. Mild spinal stenosis is present. Foramina are patent. C7-T1: No central spinal stenosis or foramina narrowing. Limited evaluation the paraspinal soft tissues is unremarkable. IMPRESSION  1.  Multilevel degenerative disc disease most evident at C5-C6 resulting in mild central spinal stenosis. There is mild central spinal stenosis also evident at C6-C7. 2. No acute musculoskeletal abnormality or abnormal spinal cord signal evident. MRI Brain March 2020: Impression:    1. No acute intracranial abnormality. 2. Neural cyst or prominent perivascular space in the left centrum semiovale. MRA Brain March 2020:  IMPRESSION:  Grossly unremarkable study. MRA Neck March 2020:  IMPRESSION:  No significant stenosis       Past Medical History:     Past medical history, surgical history, social history, family history, medications, and allergies were reviewed and updated as appropriate. PAST MEDICAL HISTORY:  Past Medical History:   Diagnosis Date    Anxiety     Ill-defined condition     Palpitations      PAST SURGICAL HISTORY:   Past Surgical History:   Procedure Laterality Date    CHOLECYSTECTOMY      HYSTERECTOMY (CERVIX STATUS UNKNOWN)      LUMBAR LAMINECTOMY       FAMILY HISTORY:  Family History   Problem Relation Age of Onset    Hypertension Mother     Hypertension Father     Diabetes Father     Heart Failure Father     Elevated Lipids Father     Breast Cancer Neg Hx     Elevated Lipids Mother       SOCIAL HISTORY:  Social History     Socioeconomic History    Marital status:      Spouse name: None    Number of children: None    Years of education: None    Highest education level: None   Tobacco Use    Smoking status: Never    Smokeless tobacco: Never   Substance and Sexual Activity    Alcohol use: Not Currently    Drug use: Not Currently       Medications/Allergies:     MEDICATIONS:   Outpatient Encounter Medications as of 9/9/2022   Medication Sig Dispense Refill    ALPRAZolam (XANAX) 0.25 MG tablet Take 0.25 mg by mouth as needed.       aspirin 81 MG chewable tablet Take 81 mg by mouth daily      calcium carbonate (OYSTER SHELL CALCIUM 500 MG) 1250 (500 Ca) MG tablet Take by mouth daily      Cholecalciferol 50 MCG (2000 UT) TABS Take by mouth      cyanocobalamin 100 MCG tablet Take 100 mcg by mouth daily      fexofenadine (ALLEGRA) 180 MG tablet Take by mouth      metoprolol succinate (TOPROL XL) 25 MG extended release tablet Take 25 mg by mouth daily       No facility-administered encounter medications on file as of 9/9/2022. ALLERGIES:  Allergies   Allergen Reactions    Dexamethasone Other (See Comments)    Erythromycin Diarrhea    Ciprofloxacin Rash       Physical Exam:     /82   Pulse 58   Ht 5' 8\" (1.727 m)   Wt 149 lb (67.6 kg)   BMI 22.66 kg/m²     General Exam:  General: Well developed, well nourished, in no apparent distress. She is quite anxious. HEENT: Normocephalic, atraumatic. Sclera anicteric. Oropharynx clear. Neck: There is some subjective stiffness and limited range of motion particularly with head turning to the left. Cardiovascular: Regular rate and rhythm. No carotid bruits. Lungs: Non-labored breathing. Abdomen: Soft, nontender, nondistended. Extremities: Peripheral pulses intact. No edema and no rashes. Neurological Exam:      MS/Language/Speech:  Alert. Oriented x 3. Language fluent. Speech normal.      Cranial Nerves: PERRL. Eye movements full. No nystagmus. Face was symmetric with good activation and normal sensation. Tongue and palate were midline. Shoulder shrug symmetric. Motor: Strength was full in all proximal and distal muscle groups. There is a mild increase in tone of the right upper extremity with some cogwheeling noted which enhances with contralateral activation. Abnormal Movements: There is an intermittent resting tremor of the right upper extremity which augments with distraction. The tremor tends to attenuate with kinesis and is not noticeable with finger-nose-finger. There is no tremor on the left. Rapid alternating movements show mild bradykinesia. Her handwriting is very legible and spiral drawing showed no discernible evidence of tremor. Sensory: Normal to light touch throughout.      Cerebellar: No ataxia or dysmetria with finger-nose-finger bilaterally. Reflexes (R/L): Biceps (3+/3+), Brachioradialis (3+/3+), Patellar (3+/3+). Gait: She is able to rise from a seated position without difficulty. Her posture is upright. Romberg was normal. She walks with a relatively fluid gait with adequate stride length though there is really diminished arm swing on the right with tremor noted. There is no freezing or hesitation. Assessment and Plan:     Alex Grant is a 81 y.o. female who presents with the following issues:     Mora Lawrence was seen today for neurologic problem. Diagnoses and all orders for this visit:    Parkinson's disease ([address]    Resting tremor    History of TIA (transient ischemic attack)    Chronic neck pain  -     [address] - Physical Therapy, [facility]    Patient presents for follow-up of a unilateral resting tremor with other evidence of parkinsonism clinically suggestive of idiopathic Parkinson's disease. We have reviewed the results of her recent cutaneous nerve/skin biopsy which does show suggestive evidence for an synucleinopathy and would be supportive of the diagnosis of PD. We addressed the diagnosis today and I answered her questions to the best of my ability. We again discussed medication options such as dopaminergic therapy but again she has declined at this time as her tremor is still not causing enough in the way of significant day-to-day limitations. We have discussed the benefits of aerobic exercise with respect to maintaining functional mobility and delaying disease progression. She will continue follow-up with her orthopedic surgeons with respect to her shoulder. MRI of the cervical spine has been reviewed with only evidence of mild degenerative changes but nothing that looks amenable to surgical intervention at this time. I did offer to send her to physical therapy locally in [place] for the chronic neck pain.      No other significant nonmotor symptoms to address except possibly chronic fatigue. We will continue to monitor. Follow up in 6 months. Signature: Rd Campos MD      Date:  9/11/2022    [facility]   [address] , [facility], [address] [place] [address]  Ph: [phone]  Fax: [phone]         I have personally interviewed and examined Mrs. Mehul Mccoy and I have personally reviewed all relevant records including labs and imaging as noted above. I have written all aspects of this note. More than 50% of this time was used for counseling regarding my diagnosis, prognosis, and plans for management. Total visit time: 36 minutes.

## 2022-09-11 ASSESSMENT — ENCOUNTER SYMPTOMS
CONSTIPATION: 0
COUGH: 0

## 2022-09-21 ENCOUNTER — HOSPITAL ENCOUNTER (OUTPATIENT)
Dept: PHYSICAL THERAPY | Age: 72
Setting detail: RECURRING SERIES
Discharge: HOME | End: 2022-09-24
Payer: MEDICARE

## 2022-09-21 PROCEDURE — 97110 THERAPEUTIC EXERCISES: CPT

## 2022-09-21 PROCEDURE — 97161 PT EVAL LOW COMPLEX 20 MIN: CPT

## 2022-09-21 NOTE — PROGRESS NOTES
Mehul Mccoy  : 1950  Primary: Medicare Part A And B  Secondary: David Cornejo @ [place] [address] [address]  Phone: [phone]  Fax: [phone] Plan Frequency: 1-2 visits per week for 8 weeks  Plan of Care/Certification Expiration Date: 22    PT Visit Info:   Visit Count:  1   OUTPATIENT PHYSICAL THERAPY:OP NOTE TYPE: Treatment Note 2022       Episode  }Appt Desk             Treatment Diagnosis:  Cervicalgia (M54.2)  Medical/Referring Diagnosis:  Chronic neck pain [M54.2, G89.29]  Referring Physician:  Griselda Galindo MD MD Orders:  PT Eval and Treat   Date of Onset:  Onset Date: 21 (Approximately 1 year ago)   Allergies:   Dexamethasone, Erythromycin, and Ciprofloxacin  Restrictions/Precautions:  No data recordedNo data recorded   Interventions Planned (Treatment may consist of any combination of the following):    Current Treatment Recommendations: Strengthening; ROM; Integrated dry needling; Modalities; Patient/Caregiver education & training; Home exercise program; Manual Therapy - Soft Tissue Mobilization; Manual Therapy - Joint Manipulation; Neuromuscular re-education   Subjective Comments:  See evaluation note for details. Initial:}     (None at rest. States that when having an episode she has intense pain.)/10Post Session:        (Not rated)/10  Medications Last Reviewed:  2022  Updated Objective Findings:  See evaluation note from today  Treatment   THERAPEUTIC EXERCISE: (10 minutes) in order to reduce neck pain and tightness. Patient performed cervical retractions in supine x 10 with demonstration and verbal cues to perform. Patient performed scapular retractions x 10 in sitting with demonstration. Upper trapezius stretching in sitting to reduce tightness to bilateral upper trapezius. HEP: Patient received handout for the exercises above.     Treatment/Session Summary:    Treatment Assessment:  Patient was able to perform exercise well, but with some increase in pain. She was able to perform cervical retractions with minimal cueing. Communication/Consultation:  None today  Equipment provided today:  None  Recommendations/Intent for next treatment session: Next visit will focus on reducing cervical pain and tightness.     Total Treatment Billable Duration:  10 minutes for therex + 30 minutes for evaluation  Time In: 1500  Time Out: [address] JADE, PT       Charge Capture  }Post Session Pain  PT Visit Info  [facility] Portal  MD Guidelines  Scanned Media  Benefits  MyChart    Future Appointments   Date Time Provider Sarai Pozo   9/23/2022  9:15 AM Cheryal Bollard, PT [facility]   9/28/2022  8:15 AM Aleida Quan, PTA [facility]   9/30/2022  9:15 AM Cheryal Bollard, PT [facility]   10/4/2022  9:30 AM Cheryal Bollard, PT [facility]   10/6/2022  9:30 AM Cheryal Bollard, PT [facility]   10/11/2022  9:30 AM Cheryal Bollard, PT [facility]   10/13/2022  9:30 AM Cheryal Bollard, PT [facility]   10/18/2022  9:30 AM Cheryal Bollard, PT [facility]   10/20/2022  8:45 AM Aleida Quan, PTA [facility]   10/25/2022  9:30 AM Cheryal Bollard, PT [facility]   10/27/2022  9:30 AM Cheryal Bollard, PT [facility]   11/1/2022  8:45 AM Aleida Quan, PTA [facility]   11/3/2022  9:30 AM Cheryal Bollard, PT [facility]   3/17/2023  9:30 AM Ronald Stephens MD [facility]

## 2022-09-21 NOTE — THERAPY EVALUATION
Yanira Harvey  : 1950  Primary: Medicare Part A And B  Secondary: [address] @ [address] [address]  Phone: [phone]  Fax: [phone] Plan Frequency: 1-2 visits per week for 8 weeksFrequency: 1-2 visits per week for 8 weeks  Plan of Care/Certification Expiration Date: 2022. Plan of Care/Certification Expiration Date: 22    PT Visit Info:         Visit Count:  1    OUTPATIENT PHYSICAL THERAPY:OP NOTE TYPE: Initial Assessment 2022               Episode  Appt Desk         Treatment Diagnosis:  Cervicalgia (M54.2)  Medical/Referring Diagnosis:  Chronic neck pain [M54.2, G89.29]  Referring Physician:  Linda Pacheco MD MD Orders:  PT Eval and Treat   Return MD Appt:  TBD  Date of Onset:  Onset Date: 21 (Approximately 1 year ago)   Allergies:  Dexamethasone, Erythromycin, and Ciprofloxacin  Restrictions/Precautions:         Medications Last Reviewed:  2022     SUBJECTIVE   History of Injury/Illness (Reason for Referral):  Patient reports that approximately one year ago she fell and landed on her elbows. She received treatment for shoulder impingement afterward and her neck pain began about this time as well. Will have episodes where he neck \"locking down\" when she sneezes or has high anxiety. This is very painful, and it occurs daily. Has been having episodes of L pinkie finger tingling. The duration of the episode is inconsistent. Has to change positions to relieve symptoms, or massage the area. Had a neck injury approximately 40 years ago when she tore some tendons and has had some pain on again/off again since. Has Parkinson's Disease. Patient Stated Goal(s):   \"To be able to sneeze without pain\"  Initial:      (None at rest. States that when having an episode she has intense pain.)/10 Post Session:      (Not rated)/10  Past Medical History/Comorbidities: per EMR  Ms. Pelayo Case  has a past medical history of Anxiety, Ill-defined condition, and Palpitations. Ms. Cecily Mcburney  has a past surgical history that includes Cholecystectomy; lumbar laminectomy; and Hysterectomy. Social History/Living Environment:   Lives With: Spouse  Type of Home: House   Prior Level of Function/Work/Activity:   Prior level of function: Independent Education: >4 years of college   Learning:   Does the patient/guardian have any barriers to learning?: No barriers  Will there be a co-learner?: No  What is the preferred language of the patient/guardian?: English  Is an  required?: No  How does the patient/guardian prefer to learn new concepts?: Listening; Pictures/Videos; Demonstration; Reading   Fall Risk Scale: Gordillo Total Score: 15  Gordillo Fall Risk: Low (0-24)       OBJECTIVE   Observations:    Description: Rounded shoulders, forward head posture. Decreased cervical lordosis. Sensation:  Overall Sensation Status: WNL (Sensation to light touch symmetrical to bilateral upper extremities along all dermatomes). Tender to palpation of bilateral upper trapezius. Cervical:  AROM Cervical Spine   Cervical spine general AROM: Flexion and extension WFL; rotation 60% to L, 70% rotation to R.  Side bending 50% limited bilaterally with pain on left  Cervical and UE Strength  R Shoulder Flexion: 5/5  R Shoulder ABduction: 5/5  R Shoulder External Rotation: 5/5  R Elbow Flexion: 5/5  L Shoulder Flexion: 5/5  L Shoulder ABduction: 5/5  L Shoulder External Rotation: 5/5  L Elbow Flexion: 5/5  Shoulder:  AROM Shoulder (Degrees)  R Shoulder Flexion 0-180: 120 (Forward elevation, in sitting)  L Shoulder Flexion 0-180: 112 (Forward elevation, in sitting)  PROM Shoulder (Degrees)  R Shoulder Flex  0-180: 160  R Shoulder ABduction 0-180: 160  R Shoulder Int Rotation  0-70: 55  R Shoulder Ext Rotation  0-90: 90  L Shoulder Flex  0-170: 150 (with pain at end range)  L Shoulder ABduction 0-140: 150 (with pain at end range)  L Shoulder Int Rotation  0-70: 60  L Shoulder Ext Rotation  0-90: 90  Shoulder Elbow Strength Testing (MMT)  R Shoulder Flexion: 5/5  R Shoulder ABduction: 5/5  R Shoulder External Rotation: 5/5  R Elbow Flexion: 5/5  L Shoulder Flexion: 5/5  L Shoulder ABduction: 5/5  L Shoulder External Rotation: 5/5  L Elbow Flexion: 5/5  Special Tests:   Cervical compression did not reproduce symptoms  Felt relief with cervical distraction  ASSESSMENT   Initial Assessment:  Patient presents to PT with chronic neck pain that is aggravated by sneezing or high anxiety, which leads to bouts of her neck \"locking down\". She has point tenderness to bilateral upper trapezius, R more than the L. She also exhibits reduced shoulder range of motion bilaterally. She is likely to benefit from PT intervention in order to reduce B upper trapezius pain and tightness. Problem List: (Impacting functional limitations): Body Structures, Functions, Activity Limitations Requiring Skilled Therapeutic Intervention: Decreased ROM; Increased pain; Decreased posture; Decreased strength   Therapy Prognosis:   Therapy Prognosis: Good   Assessment Complexity:   Low Complexity  PLAN   Effective Dates: 9/21/2022 TO Plan of Care/Certification Expiration Date: 11/18/22   Frequency/Duration: Plan Frequency: 1-2 visits per week for 8 weeks   Interventions Planned (Treatment may consist of any combination of the following):    Current Treatment Recommendations: Strengthening; ROM; Integrated dry needling; Modalities; Patient/Caregiver education & training; Home exercise program; Manual Therapy - Soft Tissue Mobilization; Manual Therapy - Joint Manipulation; Neuromuscular re-education   Goals: (Goals have been discussed and agreed upon with patient.)    Discharge Goals: Time Frame: 8 weeks  Patient will report cessation of episodes of neck \"locking down\" with sneezing or anxiety. Patient will be independent with HEP.   Patient will improve score on NDI to <10% limited. Patient will have 125 degrees of active shoulder forward elevation bilaterally. Patient will have symmetrical cervical rotation range of motion without increased discomfort. Outcome Measure: Tool Used: Neck Disability Index (NDI)  Score:  Initial: 10/50 or 20% limited Most Recent: X/50 (Date: -- )   Interpretation of Score: The Neck Disability Index is a revised form of the Oswestry Low Back Pain Index and is designed to measure the activities of daily living in person's with neck pain. Each section is scored on a 0-5 scale, 5 representing the greatest disability. The scores of each section are added together for a total score of 50. Medical Necessity:   > Skilled intervention continues to be required due to chronic neck pain and subsequent reduction in quality of life. Reason For Services/Other Comments:  > Patient continues to require skilled intervention due to chronic neck pain and subsequent deficits with functional activity. Regarding Tequila Ivan therapy, I certify that the treatment plan above will be carried out by a therapist or under their direction.   Thank you for this referral,    Asael Chew PT       Referring Physician Signature: Atilano Bumpers, MD _______________________________ Date _____________        Post Session Pain  Charge Capture  PT Visit Info MD Maury Urbina

## 2022-09-23 ENCOUNTER — HOSPITAL ENCOUNTER (OUTPATIENT)
Dept: PHYSICAL THERAPY | Age: 72
Setting detail: RECURRING SERIES
Discharge: HOME | End: 2022-09-26
Payer: MEDICARE

## 2022-09-23 PROCEDURE — 97110 THERAPEUTIC EXERCISES: CPT

## 2022-09-23 PROCEDURE — 97140 MANUAL THERAPY 1/> REGIONS: CPT

## 2022-09-23 NOTE — PROGRESS NOTES
Alex Grant  : 1950  Primary: Medicare Part A And B  Secondary: [address] @ [address] [address]  Phone: [phone]  Fax: [phone] Plan Frequency: 1-2 visits per week for 8 weeks  Plan of Care/Certification Expiration Date: 22      PT Visit Info:   Visit Count:  2   OUTPATIENT PHYSICAL THERAPY:OP NOTE TYPE: Treatment Note 2022       Episode  }Appt Desk             Treatment Diagnosis:  Cervicalgia (M54.2)  Medical/Referring Diagnosis:  Cervicalgia [M54.2]  Other chronic pain [G89.29]  Referring Physician:  Santosh Mcneil MD MD Orders:  PT Eval and Treat   Date of Onset:  Onset Date: 21 (Approximately 1 year ago)     Allergies:   Dexamethasone, Erythromycin, and Ciprofloxacin  Restrictions/Precautions:  No data recordedNo data recorded   Interventions Planned (Treatment may consist of any combination of the following):    Current Treatment Recommendations: Strengthening; ROM; Integrated dry needling; Modalities; Patient/Caregiver education & training; Home exercise program; Manual Therapy - Soft Tissue Mobilization; Manual Therapy - Joint Manipulation; Neuromuscular re-education     Subjective Comments:  Reports that she is having some allergy/sinus issues today. Wishes for treatment to be a little bit shorter today secondary to that. She was in more discomfort after her first visit. Initial:}     (Not rated)/10Post Session:        (Not rated)/10  Medications Last Reviewed:  2022  Updated Objective Findings:  None Today  Treatment   THERAPEUTIC EXERCISE: (15 minutes) in order to reduce neck pain and tightness. Stretches to B upper trapezius to reduce tightness and discomfort. Active-assisted PNF diagonals to each upper quarter for anterior elevation/posterior depression and anterior depression/posterior elevation.  Scapular retractions with elbows flexed 3 x 10, and with elbows extended 3 x 10 with yellow resistance band. Standing Y's with forearms supinated 3 x 10 bilaterally. MANUAL THERAPY (15 minutes) to reduce bilateral upper trapezius discomfort. Gentle manual traction and gentle soft tissue mobilizations to each upper trapezius to reduce discomfort. Grade 2-3 accessory joint mobilizations to R glenohumeral joint. Grade 3-4 physio mobilizations to shoulder flexion, abduction, IR and ER to each shoulder in supine. HEP: Patient received handout for the exercises above. Treatment/Session Summary:    Treatment Assessment:  Patient did well with strengthening exercises. Remains tight to bilateral upper trapezius muscles. Communication/Consultation:  None today  Equipment provided today:  None  Recommendations/Intent for next treatment session: Next visit will focus on reducing cervical pain and tightness.     Total Treatment Billable Duration:  30 minutes  Time In: 7823  Time Out: 7176    Marilynn Moreno, PT       Charge Capture  }Post Session Pain  PT Visit Info  [facility] Portal  MD Guidelines  Scanned Media  Benefits  MyChart    Future Appointments   Date Time Provider Sarai Pozo   9/28/2022  8:15 AM Roxine Meth, PTA [facility]   9/30/2022  9:15 AM Sonna Lords, PT [facility]   10/4/2022  9:30 AM Sonna Lords, PT [facility]   10/6/2022  9:30 AM Sonna Lords, PT [facility]   10/11/2022  9:30 AM Sonna Lords, PT [facility]   10/13/2022  9:30 AM Sonna Lords, PT [facility]   10/18/2022  9:30 AM Sonna Lords, PT [facility]   10/20/2022  8:45 AM Roxine Meth, PTA [facility]   10/25/2022  9:30 AM Sonna Lords, PT [facility]   10/27/2022  9:30 AM Sonna Lords, PT [facility]   11/1/2022  8:45 AM Roxine Meth, PTA [facility]   11/3/2022  9:30 AM Sonna Lords, PT [facility]   3/17/2023  9:30 AM Chuy Land MD [facility]

## 2022-09-28 ENCOUNTER — HOSPITAL ENCOUNTER (OUTPATIENT)
Dept: PHYSICAL THERAPY | Age: 72
Setting detail: RECURRING SERIES
End: 2022-09-28
Payer: MEDICARE

## 2022-09-30 ENCOUNTER — HOSPITAL ENCOUNTER (OUTPATIENT)
Dept: PHYSICAL THERAPY | Age: 72
Setting detail: RECURRING SERIES
Discharge: HOME | End: 2022-10-03
Payer: MEDICARE

## 2022-09-30 PROCEDURE — 97110 THERAPEUTIC EXERCISES: CPT

## 2022-09-30 PROCEDURE — 97140 MANUAL THERAPY 1/> REGIONS: CPT

## 2022-09-30 NOTE — PROGRESS NOTES
Rad Duran  : 1950  Primary: Medicare Part A And B  Secondary: [address] @ [address] [address]  Phone: [phone]  Fax: [phone] Plan Frequency: 1-2 visits per week for 8 weeks  Plan of Care/Certification Expiration Date: 22      PT Visit Info:   Visit Count:  3   OUTPATIENT PHYSICAL THERAPY:OP NOTE TYPE: Treatment Note 2022       Episode  }Appt Desk             Treatment Diagnosis:  Cervicalgia (M54.2)  Medical/Referring Diagnosis:  Cervicalgia [M54.2]  Other chronic pain [G89.29]  Referring Physician:  Pop Resendiz MD MD Orders:  PT Eval and Treat   Date of Onset:  Onset Date: 21 (Approximately 1 year ago)     Allergies:   Dexamethasone, Erythromycin, and Ciprofloxacin  Restrictions/Precautions:  No data recordedNo data recorded   Interventions Planned (Treatment may consist of any combination of the following):    Current Treatment Recommendations: Strengthening; ROM; Integrated dry needling; Modalities; Patient/Caregiver education & training; Home exercise program; Manual Therapy - Soft Tissue Mobilization; Manual Therapy - Joint Manipulation; Neuromuscular re-education     Subjective Comments:  States that she felt much better after her last appointment. Is sore again today though. Initial:}     (Not rated, just sore)/10Post Session:        (Sore but looser)/10  Medications Last Reviewed:  2022  Updated Objective Findings:  None Today  Treatment   THERAPEUTIC EXERCISE: (25 minutes) in order to reduce neck pain and tightness. Stretches to B upper trapezius to reduce tightness and discomfort. Active-assisted PNF diagonals to each upper quarter for anterior elevation/posterior depression and anterior depression/posterior elevation. Scapular retractions with elbows flexed 3 x 10, and with elbows extended 3 x 10 with red resistance band.  Standing Y's with forearms supinated 3 x 10 bilaterally with red resistance band. Chin tucks in standing against wall, 3 x 10 with 2-3 second holds. MANUAL THERAPY (15 minutes) to reduce bilateral upper trapezius discomfort. Gentle manual traction and gentle soft tissue mobilizations to each upper trapezius to reduce discomfort. Grade 2-3 accessory joint mobilizations to R glenohumeral joint. Grade 3-4 physio mobilizations to shoulder flexion, abduction, IR and ER to each shoulder in supine. HEP: No changes today    Treatment/Session Summary:    Treatment Assessment:  Was able to perform exercises without significant increase in pain. Remains tight and tender throughout bilateral upper trapezius. Communication/Consultation:  None today  Equipment provided today:  None  Recommendations/Intent for next treatment session: Next visit will focus on reducing cervical pain and tightness.     Total Treatment Billable Duration:  40 minutes  Time In: 4272  Time Out: 9264    Valeriy Johnson PT       Charge Capture  }Post Session Pain  PT Visit Info  [employer] Portal  MD Guidelines  Scanned Media  Benefits  MyChart    Future Appointments   Date Time Provider Sarai Pozo   10/4/2022  9:30 AM Andrew Cirri, PT [facility]   10/6/2022  9:30 AM Andrew Cirri, PT [facility]   10/11/2022  9:30 AM Andrew Cirri, PT [facility]   10/13/2022  9:30 AM Andrew Cirri, PT [facility]   10/18/2022  9:30 AM Andrew Cirri, PT [facility]   10/20/2022  8:45 AM Virgilio An, PTA [facility]   10/25/2022  9:30 AM Andrew Cirri, PT [facility]   10/27/2022  9:30 AM Andrew Cirri, PT [facility]   11/1/2022  8:45 AM [place] An, PTA [facility]   11/3/2022  9:30 AM Andrew Cirri, PT [facility]   3/17/2023  9:30 AM Rock Vee MD [facility]

## 2022-10-04 ENCOUNTER — HOSPITAL ENCOUNTER (OUTPATIENT)
Dept: PHYSICAL THERAPY | Age: 72
Setting detail: RECURRING SERIES
Discharge: HOME | End: 2022-10-07
Payer: MEDICARE

## 2022-10-04 PROCEDURE — 97530 THERAPEUTIC ACTIVITIES: CPT

## 2022-10-04 PROCEDURE — 97140 MANUAL THERAPY 1/> REGIONS: CPT

## 2022-10-04 NOTE — PROGRESS NOTES
Reena Moreno  : 1950  Primary: Medicare Part A And B  Secondary: [address] @ [address] [address]  Phone: [phone]  Fax: [phone] Plan Frequency: 1-2 visits per week for 8 weeks  Plan of Care/Certification Expiration Date: 22      PT Visit Info:   Visit Count:  4   OUTPATIENT PHYSICAL THERAPY:OP NOTE TYPE: Treatment Note 10/4/2022       Episode  }Appt Desk             Treatment Diagnosis:  Cervicalgia (M54.2)  Medical/Referring Diagnosis:  Cervicalgia [M54.2]  Other chronic pain [G89.29]  Referring Physician:  Yana Davis MD MD Orders:  PT Eval and Treat   Date of Onset:  Onset Date: 21 (Approximately 1 year ago)     Allergies:   Dexamethasone, Erythromycin, and Ciprofloxacin  Restrictions/Precautions:  No data recordedNo data recorded   Interventions Planned (Treatment may consist of any combination of the following):    Current Treatment Recommendations: Strengthening; ROM; Integrated dry needling; Modalities; Patient/Caregiver education & training; Home exercise program; Manual Therapy - Soft Tissue Mobilization; Manual Therapy - Joint Manipulation; Neuromuscular re-education     Subjective Comments:  Has much less pain than she had previously. Still has issues when stressed. Initial:     (None upon arrival)/10Post Session:        (Alger looser)/10  Medications Last Reviewed:  10/4/2022  Updated Objective Findings:  None Today  Treatment   THERAPEUTIC EXERCISE: (25 minutes) in order to reduce neck pain and tightness. Stretches to B upper trapezius to reduce tightness and discomfort. Active-assisted PNF diagonals to each upper quarter for anterior elevation/posterior depression and anterior depression/posterior elevation. Scapular retractions with elbows flexed 3 x 10, and with elbows extended 3 x 10 with black resistance band. Side-lying middle trap raises 3 x 10 R.      MANUAL THERAPY (15 minutes) to reduce bilateral upper trapezius discomfort. Gentle manual traction and gentle soft tissue mobilizations to each upper trapezius to reduce discomfort. Grade 2-3 accessory joint mobilizations to R glenohumeral joint. Grade 3-4 physio mobilizations to shoulder flexion, abduction, IR and ER to each shoulder in supine. HEP: No changes today    Treatment/Session Summary:    Treatment Assessment:  Patient reports good progress with symptoms. Remains tight to upper trapezius but tolerated increased resistance with scapular strengthening. Communication/Consultation:  None today  Equipment provided today:  None  Recommendations/Intent for next treatment session: Next visit will focus on reducing cervical pain and tightness.     Total Treatment Billable Duration:  40 minutes  Time In: 0930  Time Out: 1015    Bridger Terry PT       Charge Capture  }Post Session Pain  PT Visit Info  [employer] Portal  MD Guidelines  Scanned Media  Benefits  MyChart    Future Appointments   Date Time Provider Sarai Pozo   10/6/2022  9:30 AM Christean Laser, PT [facility]   10/11/2022  9:30 [facility]   10/13/2022  9:30 AM [facility]   10/18/2022  9:30 AM Christean Laser, PT [facility]   10/20/2022  8:45 AM Joaquin Lesch, PTA [facility]   10/25/2022  9:30 AM Christean Laser, PT [facility]   10/27/2022  9:30 AM Christean Laser, PT [facility]   11/1/2022  8:45 AM Jed Lesch, PTA [facility]   11/3/2022  9:30 AM Christean Laser, PT [facility]   3/17/2023  9:30 AM Perlita Wright MD [facility]

## 2022-10-06 ENCOUNTER — APPOINTMENT (OUTPATIENT)
Dept: PHYSICAL THERAPY | Age: 72
End: 2022-10-06
Payer: MEDICARE

## 2022-10-06 ENCOUNTER — APPOINTMENT (OUTPATIENT)
Dept: GENERAL RADIOLOGY | Age: 72
End: 2022-10-06
Payer: MEDICARE

## 2022-10-06 ENCOUNTER — APPOINTMENT (OUTPATIENT)
Dept: CT IMAGING | Age: 72
End: 2022-10-06
Payer: MEDICARE

## 2022-10-06 ENCOUNTER — NURSE TRIAGE (OUTPATIENT)
Dept: OTHER | Facility: CLINIC | Age: 72
End: 2022-10-06

## 2022-10-06 ENCOUNTER — HOSPITAL ENCOUNTER (EMERGENCY)
Age: 72
Discharge: HOME | End: 2022-10-06
Attending: EMERGENCY MEDICINE
Payer: MEDICARE

## 2022-10-06 VITALS
TEMPERATURE: 98.1 F | HEIGHT: 68 IN | OXYGEN SATURATION: 99 % | RESPIRATION RATE: 11 BRPM | WEIGHT: 150 LBS | SYSTOLIC BLOOD PRESSURE: 119 MMHG | DIASTOLIC BLOOD PRESSURE: 71 MMHG | HEART RATE: 80 BPM | BODY MASS INDEX: 22.73 KG/M2

## 2022-10-06 DIAGNOSIS — R06.02 SHORTNESS OF BREATH: ICD-10-CM

## 2022-10-06 DIAGNOSIS — R42 VERTIGO: Primary | ICD-10-CM

## 2022-10-06 LAB
ALBUMIN SERPL-MCNC: 4.1 G/DL (ref 3.2–4.6)
ALBUMIN/GLOB SERPL: 1.2 {RATIO} (ref 1.2–3.5)
ALP SERPL-CCNC: 76 U/L (ref 50–136)
ALT SERPL-CCNC: 23 U/L (ref 12–65)
ANION GAP SERPL CALC-SCNC: 5 MMOL/L (ref 4–13)
AST SERPL-CCNC: 23 U/L (ref 15–37)
BASOPHILS # BLD: 0.1 K/UL (ref 0–0.2)
BASOPHILS NFR BLD: 1 % (ref 0–2)
BILIRUB SERPL-MCNC: 0.8 MG/DL (ref 0.2–1.1)
BUN SERPL-MCNC: 12 MG/DL (ref 8–23)
CALCIUM SERPL-MCNC: 9.7 MG/DL (ref 8.3–10.4)
CHLORIDE SERPL-SCNC: 103 MMOL/L (ref 101–110)
CO2 SERPL-SCNC: 30 MMOL/L (ref 21–32)
CREAT SERPL-MCNC: 0.8 MG/DL (ref 0.6–1)
DIFFERENTIAL METHOD BLD: NORMAL
EOSINOPHIL # BLD: 0.1 K/UL (ref 0–0.8)
EOSINOPHIL NFR BLD: 2 % (ref 0.5–7.8)
ERYTHROCYTE [DISTWIDTH] IN BLOOD BY AUTOMATED COUNT: 12.6 % (ref 11.9–14.6)
GLOBULIN SER CALC-MCNC: 3.5 G/DL (ref 2.3–3.5)
GLUCOSE SERPL-MCNC: 108 MG/DL (ref 65–100)
HCT VFR BLD AUTO: 44.3 % (ref 35.8–46.3)
HGB BLD-MCNC: 14.3 G/DL (ref 11.7–15.4)
IMM GRANULOCYTES # BLD AUTO: 0 K/UL (ref 0–0.5)
IMM GRANULOCYTES NFR BLD AUTO: 0 % (ref 0–5)
LYMPHOCYTES # BLD: 0.9 K/UL (ref 0.5–4.6)
LYMPHOCYTES NFR BLD: 13 % (ref 13–44)
MAGNESIUM SERPL-MCNC: 2.4 MG/DL (ref 1.8–2.4)
MCH RBC QN AUTO: 29.2 PG (ref 26.1–32.9)
MCHC RBC AUTO-ENTMCNC: 32.3 G/DL (ref 31.4–35)
MCV RBC AUTO: 90.4 FL (ref 79.6–97.8)
MONOCYTES # BLD: 0.4 K/UL (ref 0.1–1.3)
MONOCYTES NFR BLD: 7 % (ref 4–12)
NEUTS SEG # BLD: 5.1 K/UL (ref 1.7–8.2)
NEUTS SEG NFR BLD: 77 % (ref 43–78)
NRBC # BLD: 0 K/UL (ref 0–0.2)
PLATELET # BLD AUTO: 263 K/UL (ref 150–450)
PMV BLD AUTO: 10.1 FL (ref 9.4–12.3)
POTASSIUM SERPL-SCNC: 3.8 MMOL/L (ref 3.5–5.1)
PROT SERPL-MCNC: 7.6 G/DL (ref 6.3–8.2)
RBC # BLD AUTO: 4.9 M/UL (ref 4.05–5.2)
SODIUM SERPL-SCNC: 138 MMOL/L (ref 136–145)
WBC # BLD AUTO: 6.7 K/UL (ref 4.3–11.1)

## 2022-10-06 PROCEDURE — 80053 COMPREHEN METABOLIC PANEL: CPT

## 2022-10-06 PROCEDURE — 70450 CT HEAD/BRAIN W/O DYE: CPT

## 2022-10-06 PROCEDURE — 83735 ASSAY OF MAGNESIUM: CPT

## 2022-10-06 PROCEDURE — 6360000004 HC RX CONTRAST MEDICATION: Performed by: EMERGENCY MEDICINE

## 2022-10-06 PROCEDURE — 71045 X-RAY EXAM CHEST 1 VIEW: CPT

## 2022-10-06 PROCEDURE — 85025 COMPLETE CBC W/AUTO DIFF WBC: CPT

## 2022-10-06 PROCEDURE — 2580000003 HC RX 258: Performed by: EMERGENCY MEDICINE

## 2022-10-06 PROCEDURE — 99285 EMERGENCY DEPT VISIT HI MDM: CPT

## 2022-10-06 PROCEDURE — 70498 CT ANGIOGRAPHY NECK: CPT

## 2022-10-06 RX ORDER — SODIUM CHLORIDE 0.9 % (FLUSH) 0.9 %
10 SYRINGE (ML) INJECTION
Status: COMPLETED | OUTPATIENT
Start: 2022-10-06 | End: 2022-10-06

## 2022-10-06 RX ORDER — 0.9 % SODIUM CHLORIDE 0.9 %
100 INTRAVENOUS SOLUTION INTRAVENOUS
Status: COMPLETED | OUTPATIENT
Start: 2022-10-06 | End: 2022-10-06

## 2022-10-06 RX ADMIN — IOPAMIDOL 65 ML: 755 INJECTION, SOLUTION INTRAVENOUS at 10:26

## 2022-10-06 RX ADMIN — SODIUM CHLORIDE, PRESERVATIVE FREE 10 ML: 5 INJECTION INTRAVENOUS at 10:25

## 2022-10-06 RX ADMIN — SODIUM CHLORIDE 100 ML: 9 INJECTION, SOLUTION INTRAVENOUS at 10:25

## 2022-10-06 ASSESSMENT — PAIN - FUNCTIONAL ASSESSMENT: PAIN_FUNCTIONAL_ASSESSMENT: NONE - DENIES PAIN

## 2022-10-06 ASSESSMENT — ENCOUNTER SYMPTOMS
COUGH: 0
SORE THROAT: 0
COLOR CHANGE: 0
DIARRHEA: 0
BACK PAIN: 0
SHORTNESS OF BREATH: 0
NAUSEA: 0
CONSTIPATION: 0
VOMITING: 0
VISUAL CHANGE: 0
ABDOMINAL PAIN: 0
RHINORRHEA: 0

## 2022-10-06 NOTE — ED PROVIDER NOTES
Emergency Department Provider Note                   PCP:                Jackie Stephens               Age: 81 y.o. Sex: female       ICD-10-CM    1. Vertigo  R42       2. Shortness of breath  R06.02           DISPOSITION Decision To Discharge 10/06/2022 11:26:28 AM       MDM  Number of Diagnoses or Management Options  Shortness of breath  Vertigo  Diagnosis management comments: Patient with a history of vertigo with episodes of dizziness every day. Feeling better here. No acute on CT. Also with episodes of shortness of breath frequently. No longer short of breath and feels well. Will discharge. Amount and/or Complexity of Data Reviewed  Clinical lab tests: ordered and reviewed  Tests in the radiology section of CPT®: ordered and reviewed  Tests in the medicine section of CPT®: ordered and reviewed    Patient Progress  Patient progress: stable             Orders Placed This Encounter   Procedures    XR CHEST PORTABLE    CT HEAD WO CONTRAST    CTA HEAD NECK W CONTRAST    CBC with Auto Differential    Comprehensive Metabolic Panel    Magnesium    Cardiac Monitor - ED Only    Continuous Pulse Oximetry    EKG 12 Lead    Saline lock IV        Medications   iopamidol (ISOVUE-370) 76 % injection 65 mL (65 mLs IntraVENous Given 10/6/22 1026)   0.9 % sodium chloride bolus (100 mLs IntraVENous New Bag 10/6/22 1025)   sodium chloride flush 0.9 % injection 10 mL (10 mLs IntraVENous Given 10/6/22 1025)       New Prescriptions    No medications on file        Agueda Castillo is a 81 y.o. female who presents to the Emergency Department with chief complaint of    Chief Complaint   Patient presents with    Dizziness      Patient with history of Parkinson's. Has a history of vertigo and episodes of dizziness every day. This morning had an episode of dizziness that lasted longer than normal.  She also had some mild shortness of breath afterwards. Came in for evaluation. Now here feels much better. No chest pain. No headache or blurry vision. No extremity numbness or weakness. The history is provided by the patient. No  was used. Dizziness  Quality:  Vertigo  Severity:  Mild  Chronicity:  Recurrent  Relieved by:  Being still  Worsened by: Movement  Associated symptoms: no chest pain, no diarrhea, no headaches, no nausea, no palpitations, no shortness of breath, no vision changes, no vomiting and no weakness      All other systems reviewed and are negative unless otherwise stated in the history of present illness section. Review of Systems   Constitutional:  Negative for chills and fever. HENT:  Negative for rhinorrhea and sore throat. Respiratory:  Negative for cough and shortness of breath. Cardiovascular:  Negative for chest pain and palpitations. Gastrointestinal:  Negative for abdominal pain, constipation, diarrhea, nausea and vomiting. Genitourinary:  Negative for dysuria and hematuria. Musculoskeletal:  Negative for back pain and neck pain. Skin:  Negative for color change and rash. Neurological:  Positive for dizziness and tremors. Negative for facial asymmetry, weakness, light-headedness, numbness and headaches. Psychiatric/Behavioral:  Negative for confusion. All other systems reviewed and are negative.     Past Medical History:   Diagnosis Date    Anxiety     Ill-defined condition     Palpitations         Past Surgical History:   Procedure Laterality Date    CHOLECYSTECTOMY      HYSTERECTOMY (CERVIX STATUS UNKNOWN)      LUMBAR LAMINECTOMY          Family History   Problem Relation Age of Onset    Hypertension Mother     Hypertension Father     Diabetes Father     Heart Failure Father     Elevated Lipids Father     Breast Cancer Neg Hx     Elevated Lipids Mother         Social History     Socioeconomic History    Marital status:    Tobacco Use    Smoking status: Never    Smokeless tobacco: Never   Substance and Sexual Activity    Alcohol use: Not Currently Drug use: Not Currently        Allergies: Dexamethasone, Erythromycin, and Ciprofloxacin    Previous Medications    ALPRAZOLAM (XANAX) 0.25 MG TABLET    Take 0.25 mg by mouth as needed. ASPIRIN 81 MG CHEWABLE TABLET    Take 81 mg by mouth daily    CALCIUM CARBONATE (OYSTER SHELL CALCIUM 500 MG) 1250 (500 CA) MG TABLET    Take by mouth daily    CHOLECALCIFEROL 50 MCG (2000 UT) TABS    Take by mouth    CYANOCOBALAMIN 100 MCG TABLET    Take 100 mcg by mouth daily    FEXOFENADINE (ALLEGRA) 180 MG TABLET    Take by mouth    METOPROLOL SUCCINATE (TOPROL XL) 25 MG EXTENDED RELEASE TABLET    Take 25 mg by mouth daily        Vitals signs and nursing note reviewed. Patient Vitals for the past 4 hrs:   Temp Pulse Resp BP SpO2   10/06/22 1000 -- 82 14 129/61 100 %   10/06/22 0924 98.1 °F (36.7 °C) 94 20 136/76 99 %          Physical Exam  Vitals and nursing note reviewed. Constitutional:       Appearance: Normal appearance. HENT:      Head: Normocephalic and atraumatic. Eyes:      Extraocular Movements: Extraocular movements intact. Pupils: Pupils are equal, round, and reactive to light. Cardiovascular:      Rate and Rhythm: Normal rate and regular rhythm. Pulmonary:      Effort: Pulmonary effort is normal.      Breath sounds: Normal breath sounds. No wheezing. Abdominal:      General: Bowel sounds are normal.      Palpations: Abdomen is soft. Tenderness: There is no abdominal tenderness. Musculoskeletal:         General: No swelling. Normal range of motion. Cervical back: Normal range of motion. No tenderness. Skin:     General: Skin is warm and dry. Neurological:      General: No focal deficit present. Mental Status: She is alert. Mental status is at baseline. Cranial Nerves: No cranial nerve deficit.         Procedures        Results for orders placed or performed during the hospital encounter of 10/06/22   XR CHEST PORTABLE    Narrative    EXAMINATION: XR CHEST PORTABLE 10/6/2022 9:50 AM    ACCESSION NUMBER: [id number]    COMPARISON: 10/27/2019    INDICATION: Shortness of Breath    TECHNIQUE: A single view of the chest was obtained. FINDINGS:   Support Devices:   *  None    Cardiac Silhouette: Within normal limits in size. Mediastinum: Normal mediastinal contours. Lungs: No airspace consolidation. No pneumothorax or sizable pleural effusion. Upper Abdomen: Normal     Miscellaneous: No fracture or suspicious osseous lesion. Impression    No acute cardiopulmonary abnormality. CT HEAD WO CONTRAST    Narrative    EXAMINATION: CT HEAD WO CONTRAST 10/6/2022 9:42 AM    ACCESSION NUMBER: [id number]    COMPARISON: Brain MR dated 3/16/2020    INDICATION: dizziness    TECHNIQUE: Multiple-row detector helical CT examination of the head without  intravenous contrast.     Radiation dose reduction techniques were used for this study. Our CT scanners  use one or all of the following: Automated exposure control, adjustment of the  mA and/or kV according to patient size, iterative reconstruction. FINDINGS:  BRAIN: No intracranial hemorrhage. No mass effect or herniation. The grey-white  matter differentiation is maintained. White matter is within normal limits for  age. VENTRICLES/EXTRA-AXIAL: No hydrocephalus or extra-axial fluid collection. BONES: No acute fracture. SOFT TISSUES: The paranasal sinuses and mastoid air cells are clear. Impression    No evidence of acute ischemia, hemorrhage, or mass effect. CTA HEAD NECK W CONTRAST    Narrative    Title:  CT arteriogram of the neck and head. Indication: Dizziness. .    Technique: Axial images of the neck and head were obtained after the uneventful   administration of intravenous iodinated contrast media. Contrast was used to  best identify the arterial structures.   Images were reviewed on a separate, free  standing, three-dimensional workstation as per the referring physicians request.       All stenosis percentages derived by comparing the narrowest segment with the  distal Internal Carotid Artery luminal diameter, as described in the [place]  American Symptomatic Carotid Endarterectomy Trial (NASCET) criteria. All CT scans at this facility are performed using dose reduction/dose modulation  techniques, as appropriate the performed exam, including the following:   Automated Exposure Control; Adjustment of the mA and/or kV according to patient  size (this includes techniques or standardized protocols for targeted exams  where dose is matched to indication/reason for exam); and Use of Iterative  Reconstruction Technique. Comparison: None. Findings:     Lungs:  No focal consolidation or pleural effusions. No suspicious pulmonary  nodules. .    Bones:  No osseous destruction. . Degenerative disease is present at C4-5, C5-6,  and C6-7. Paranasal sinuses:  Paranasal sinuses are clear. .    Brain:  No midline shift. No enhancing mass lesion or hydrocephalus. .  Small  focus of encephalomalacia in the posterior left frontal lobe, unchanged. Soft tissues:  Within normal limits. .      Dural venous sinuses:  Patent. Aortic arch:  Non-aneurysmal. Arch vessels are patent. .      ANTERIOR CIRCULATION    Right common carotid artery:  No significant stenosis or occlusion. Right internal carotid artery:  No significant stenosis or occlusion. Right middle cerebral artery:  No significant stenosis, occlusion, or aneurysm. Right anterior cerebral artery:  No significant stenosis, occlusion, or  aneurysm. Left common carotid artery: No significant stenosis or occlusion. Left internal carotid artery:  No significant stenosis or occlusion. Left middle cerebral artery:  No significant stenosis, occlusion, or aneurysm. Left anterior cerebral artery:  No significant stenosis, occlusion, or aneurysm.     Anterior communicating artery: No significant stenosis, occlusion, or aneurysm. POSTERIOR CIRCULATION    Right vertebral artery:  No significant stenosis or occlusion. Metal streak  artifact related to dental amalgam limits evaluation of the distal cervical  right vertebral artery. Left vertebral artery:  Patent. Beadlike appearance of the cervical left  vertebral artery at the level of C1-C2. Mild to moderate stenoses appear to be  present in this area. Basilar artery:  No significant stenosis, occlusion, or aneurysm. Right posterior communicating artery: No significant stenosis, occlusion, or  aneurysm. Left posterior communicating artery:  No significant stenosis, occlusion, or  aneurysm. Right posterior cerebral artery:  No significant stenosis, occlusion, or  aneurysm. Left posterior cerebral artery:  No significant stenosis, occlusion, or  aneurysm. Impression    Findings concerning for fibromuscular dysplasia (FMD) with a  bead-like appearance of the distal cervical left vertebral artery at the level  of C1-C2. The vertebral arteries are codominant.        CBC with Auto Differential   Result Value Ref Range    WBC 6.7 4.3 - 11.1 K/uL    RBC 4.90 4.05 - 5.2 M/uL    Hemoglobin 14.3 11.7 - 15.4 g/dL    Hematocrit 44.3 35.8 - 46.3 %    MCV 90.4 79.6 - 97.8 FL    MCH 29.2 26.1 - 32.9 PG    MCHC 32.3 31.4 - 35.0 g/dL    RDW 12.6 11.9 - 14.6 %    Platelets 804 573 - 455 K/uL    MPV 10.1 9.4 - 12.3 FL    nRBC 0.00 0.0 - 0.2 K/uL    Differential Type AUTOMATED      Seg Neutrophils 77 43 - 78 %    Lymphocytes 13 13 - 44 %    Monocytes 7 4.0 - 12.0 %    Eosinophils % 2 0.5 - 7.8 %    Basophils 1 0.0 - 2.0 %    Immature Granulocytes 0 0.0 - 5.0 %    Segs Absolute 5.1 1.7 - 8.2 K/UL    Absolute Lymph # 0.9 0.5 - 4.6 K/UL    Absolute Mono # 0.4 0.1 - 1.3 K/UL    Absolute Eos # 0.1 0.0 - 0.8 K/UL    Basophils Absolute 0.1 0.0 - 0.2 K/UL    Absolute Immature Granulocyte 0.0 0.0 - 0.5 K/UL   Comprehensive Metabolic Panel   Result Value Ref Range    Sodium 138 136 - 145 mmol/L Potassium 3.8 3.5 - 5.1 mmol/L    Chloride 103 101 - 110 mmol/L    CO2 30 21 - 32 mmol/L    Anion Gap 5 4 - 13 mmol/L    Glucose 108 (H) 65 - 100 mg/dL    BUN 12 8 - 23 MG/DL    Creatinine 0.80 0.6 - 1.0 MG/DL    Est, Glom Filt Rate >60 >60 ml/min/1.73m2    Calcium 9.7 8.3 - 10.4 MG/DL    Total Bilirubin 0.8 0.2 - 1.1 MG/DL    ALT 23 12 - 65 U/L    AST 23 15 - 37 U/L    Alk Phosphatase 76 50 - 136 U/L    Total Protein 7.6 6.3 - 8.2 g/dL    Albumin 4.1 3.2 - 4.6 g/dL    Globulin 3.5 2.3 - 3.5 g/dL    Albumin/Globulin Ratio 1.2 1.2 - 3.5     Magnesium   Result Value Ref Range    Magnesium 2.4 1.8 - 2.4 mg/dL        CTA HEAD NECK W CONTRAST   Final Result   Findings concerning for fibromuscular dysplasia (FMD) with a   bead-like appearance of the distal cervical left vertebral artery at the level   of C1-C2. The vertebral arteries are codominant. XR CHEST PORTABLE   Final Result   No acute cardiopulmonary abnormality. CT HEAD WO CONTRAST   Final Result   No evidence of acute ischemia, hemorrhage, or mass effect. NIH Stroke Scale  Interval: Hand-off/Transfer  Level of Consciousness (1a): Alert  LOC Questions (1b): Answers both correctly  LOC Commands (1c): Performs both tasks correctly  Best Gaze (2): Normal  Visual (3): No visual loss  Facial Palsy (4): Normal symmetrical movement  Motor Arm, Left (5a): No drift  Motor Arm, Right (5b): No drift  Motor Leg, Left (6a): No drift  Motor Leg, Right (6b): No drift  Limb Ataxia (7): Absent  Sensory (8): Normal  Best Language (9): No aphasia  Dysarthria (10): Normal  Extinction and Inattention (11): No abnormality  Total: 0                Voice dictation software was used during the making of this note. This software is not perfect and grammatical and other typographical errors may be present. This note has not been completely proofread for errors.      Leia Ledesma MD  10/06/22 1125

## 2022-10-06 NOTE — ED NOTES
I have reviewed discharge instructions with the patient and spouse. The patient and spouse verbalized understanding. Patient left ED via Discharge Method: ambulatory to Home with spouse. Opportunity for questions and clarification provided. Patient given 0 scripts. To continue your aftercare when you leave the hospital, you may receive an automated call from our care team to check in on how you are doing. This is a free service and part of our promise to provide the best care and service to meet your aftercare needs.  If you have questions, or wish to unsubscribe from this service please call [phone]. Thank you for Choosing our [facility] Emergency Department.         Priscilla Pablo RN  10/06/22 5415

## 2022-10-06 NOTE — TELEPHONE ENCOUNTER
Received call from [facility] at [facility] with Red Flag Complaint. Subjective: Caller states \"has extreme vertigo. Its not going away. Having trouble walking. Also has diarrhea  Hx Parkinsons  Current Symptoms: lightheaded    Onset: 1hr ago  ago;     Associated Symptoms:   Pain: denies pain    Temperature:  denies fever    What has been tried:     LMP: NA Pregnant: NA    Recommended disposition: Go to ED Now    Care advice provided, patient verbalizes understanding; denies any other questions or concerns; instructed to call back for any new or worsening symptoms. Patient/Caller agrees with recommended disposition; writer provided warm transfer to Jordon Roper at [facility] for appointment scheduling    Attention Provider: Thank you for allowing me to participate in the care of your patient. The patient was connected to triage in response to information provided to the ECC/PSC. Please do not respond through this encounter as the response is not directed to a shared pool.         Reason for Disposition   [1] Dizziness (vertigo) present now AND [2] one or more STROKE RISK FACTORS (i.e., hypertension, diabetes, prior stroke/TIA, heart attack)  (Exception: prior physician evaluation for this AND no different/worse than usual)    Protocols used: Dizziness - Vertigo-ADULT-

## 2022-10-06 NOTE — ED TRIAGE NOTES
Patient ambulatory to triage with mask in place. Patient reports dizziness that started around 0800 this morning. Pt also reports some shob this morning that started prior to the vertigo. Pt reports generalized weakness.  Denies chest pain, fever, cough, sore throat

## 2022-10-06 NOTE — PROGRESS NOTES
Karen Estrada (:  1950) is a 81 y.o. female,New patient, here for evaluation of the following chief complaint(s):  New Patient (Establish with doctor)         ASSESSMENT/PLAN:  1. Spinal stenosis, cervical region  MRI cervical spine from 2021 with multilevel DDD most evident at C5-6 resulting in mild central spinal stenosis; mild central spinal stenosis also evident at C6-7  Recommended topical therapy. Continue with physical therapy    2. Parkinson's disease (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 Last notes reviewed with patient declining medication at this time    3. Vitamin D deficiency  Obtain last labs  Continue current vitamin D supplementation    4. B12 deficiency  Obtain last labs  Continue current B12 supplementation    5. Allergic rhinitis, unspecified seasonality, unspecified trigger  Continue Allegra and Flonase    6. Chest pain, unspecified type  EKG in office today shows NSR, normal axis, no acute ST segment or T wave abnormalities  Suspect chest congestion, occasional tightness and shortness of breath due to underlying allergies and possible reactive airway disease  Management of allergies as above  Recent chest x-ray unremarkable  Offered prescription for as needed albuterol inhaler but respectfully declines at this time    - EKG 12 Lead; Future  - EKG 12 Lead    7. Palpitations  EKG as noted above  Continue metoprolol succinate    - metoprolol succinate (TOPROL XL) 25 MG extended release tablet; Take 1.5 tablets by mouth daily  Dispense: 135 tablet; Refill: 1    8.  Anxiety and depression  PHQ-9 score of 15, JANINE-7 score of 18 in the office today however likely in large part due to patient's recent diagnosis of Parkinson's disease  Encouraged follow-up with therapist, PDMP reviewed. Refill as needed alprazolam with patient counseled on potential sedating effects and not to drive or operate heavy machinery after use  Verbal contract made with patient that should she experience worsening mood symptoms including new onset suicidal/homicidal ideations that she will contact provider on call or seek other immediate medical attention    - ALPRAZolam (XANAX) 0.25 MG tablet; Take 1 tablet by mouth 3 times daily as needed for Anxiety for up to 60 days. Dispense: 90 tablet; Refill: 1    9. Abnormal CT scan  CTA of the head and neck on 10/6/2022 with beadlike appearance of cervical left vertebral artery at the level of C1-2 (mild to moderate stenoses appear to be present in this area); small focus of encephalomalacia in the posterior left frontal lobe, unchanged from prior  Echocardiogram on 3/16/2020 with a EF of 55 to 60%, no evidence of right to left shunt with provocative testing  I will reach out to neurology to discuss imaging findings to see if additional treatment and/or work-up is needed    No follow-ups on file. Subjective   SUBJECTIVE/OBJECTIVE:  Patient is a 79-year-old  female who presents to the office today to establish care. Patient was recently diagnosed with Parkinson's disease causing her a great amount of distress with some anxiety and depression. Does have occasional thoughts that would be better if she were not here but denies suicidal or homicidal intention or plan. Is thinking about establishing back with her therapist that she was seeing before the pandemic. Does have chronic neck pain currently undergoing physical therapy which seems to help. Describes a burning sensation.   Does occasionally have tingling in the the left fourth and fifth fingers and around her left eye but often times improved with using a manual massager to her neck. Does have a history of a torn ligament in her neck in the past.  Does also have chronic chest congestion with some shortness of breath and occasional chest tightness. Congestion has been present for years with some cough with clear sputum production in the morning. Does suffer from postnasal drip year long. Remains on Allegra and Flonase. Chest tightness is sternal but does not radiate, occurs randomly and has been present for the past few months. Patient was seen in the emergency room yesterday for new onset vertigo and some shortness of breath. Does suffer from chronic insomnia with difficulty remaining asleep, typically awakening once or twice a night to urinate but states if she cuts off her fluid intake earlier in the evening this is improved. No nighttime reflux. Denies fevers, chills, paroxysmal nocturnal dyspnea, apnea or hemoptysis. Does have a good support system in the form of her  and one of her daughters. Review of Systems   Constitutional:  Negative for chills and fever. HENT:  Positive for congestion. Respiratory:  Positive for cough, chest tightness, shortness of breath and wheezing. Denies hemoptysis, paroxysmal nocturnal dyspnea or apnea   Cardiovascular:  Positive for chest pain. Gastrointestinal:  Negative for nausea and vomiting. Musculoskeletal:  Positive for myalgias and neck pain. Neurological:         Positive for paresthesias along the left thigh and along the left fourth and fifth fingers   Psychiatric/Behavioral:  Positive for sleep disturbance. Negative for self-injury and suicidal ideas. The patient is nervous/anxious. Positive for depression        Objective   Physical Exam  Vitals reviewed. Constitutional:       General: She is not in acute distress. Appearance: She is not ill-appearing, toxic-appearing or diaphoretic. Interventions: She is not intubated.      Comments: Elderly, frail appearing  female in no acute cardiopulmonary distress   HENT:      Head: Normocephalic and atraumatic. Eyes:      General:         Right eye: No discharge. Left eye: No discharge. Extraocular Movements: Extraocular movements intact. Neck:      Vascular: No carotid bruit. Cardiovascular:      Rate and Rhythm: Normal rate and regular rhythm. Heart sounds: No murmur heard. No friction rub. No gallop. Pulmonary:      Effort: Pulmonary effort is normal. No tachypnea, accessory muscle usage, respiratory distress or retractions. She is not intubated. Breath sounds: No wheezing, rhonchi or rales. Abdominal:      General: Abdomen is flat. Bowel sounds are normal.      Palpations: Abdomen is soft. Tenderness: There is no abdominal tenderness. There is no guarding. Skin:     General: Skin is warm and dry. Coloration: Skin is not jaundiced. Neurological:      Cranial Nerves: No cranial nerve deficit, dysarthria or facial asymmetry. Sensory: Sensation is intact. Motor: Tremor (Resting tremor of right hand) present. No weakness (Full strength 5/5 in all 4 extremities). Psychiatric:         Attention and Perception: Attention normal.         Mood and Affect: Mood is depressed. Affect is blunt. Affect is not tearful. Speech: Speech normal. Speech is not rapid and pressured or slurred. Behavior: Behavior normal. Behavior is not agitated, aggressive or combative. Behavior is cooperative. Thought Content: Thought content normal. Thought content does not include homicidal or suicidal plan. On this date 10/7/2022 I have spent 60 minutes reviewing previous notes, test results and face to face with the patient discussing the diagnosis and importance of compliance with the treatment plan as well as documenting on the day of the visit. An electronic signature was used to authenticate this note.     --Nacho Barker DO

## 2022-10-07 ENCOUNTER — OFFICE VISIT (OUTPATIENT)
Dept: INTERNAL MEDICINE CLINIC | Facility: CLINIC | Age: 72
End: 2022-10-07
Payer: MEDICARE

## 2022-10-07 VITALS
OXYGEN SATURATION: 100 % | HEART RATE: 83 BPM | DIASTOLIC BLOOD PRESSURE: 72 MMHG | WEIGHT: 146.8 LBS | SYSTOLIC BLOOD PRESSURE: 116 MMHG | BODY MASS INDEX: 22.25 KG/M2 | TEMPERATURE: 97.7 F | HEIGHT: 68 IN

## 2022-10-07 DIAGNOSIS — G20 PARKINSON'S DISEASE (HCC): ICD-10-CM

## 2022-10-07 DIAGNOSIS — R07.9 CHEST PAIN, UNSPECIFIED TYPE: ICD-10-CM

## 2022-10-07 DIAGNOSIS — R00.2 PALPITATIONS: ICD-10-CM

## 2022-10-07 DIAGNOSIS — R93.89 ABNORMAL CT SCAN: ICD-10-CM

## 2022-10-07 DIAGNOSIS — M48.02 SPINAL STENOSIS, CERVICAL REGION: Primary | ICD-10-CM

## 2022-10-07 DIAGNOSIS — J30.9 ALLERGIC RHINITIS, UNSPECIFIED SEASONALITY, UNSPECIFIED TRIGGER: ICD-10-CM

## 2022-10-07 DIAGNOSIS — E55.9 VITAMIN D DEFICIENCY: ICD-10-CM

## 2022-10-07 DIAGNOSIS — E53.8 B12 DEFICIENCY: ICD-10-CM

## 2022-10-07 DIAGNOSIS — F41.9 ANXIETY AND DEPRESSION: ICD-10-CM

## 2022-10-07 DIAGNOSIS — F51.04 CHRONIC INSOMNIA: ICD-10-CM

## 2022-10-07 DIAGNOSIS — F32.A ANXIETY AND DEPRESSION: ICD-10-CM

## 2022-10-07 PROCEDURE — 93000 ELECTROCARDIOGRAM COMPLETE: CPT | Performed by: STUDENT IN AN ORGANIZED HEALTH CARE EDUCATION/TRAINING PROGRAM

## 2022-10-07 PROCEDURE — 3017F COLORECTAL CA SCREEN DOC REV: CPT | Performed by: STUDENT IN AN ORGANIZED HEALTH CARE EDUCATION/TRAINING PROGRAM

## 2022-10-07 PROCEDURE — 1090F PRES/ABSN URINE INCON ASSESS: CPT | Performed by: STUDENT IN AN ORGANIZED HEALTH CARE EDUCATION/TRAINING PROGRAM

## 2022-10-07 PROCEDURE — G8484 FLU IMMUNIZE NO ADMIN: HCPCS | Performed by: STUDENT IN AN ORGANIZED HEALTH CARE EDUCATION/TRAINING PROGRAM

## 2022-10-07 PROCEDURE — G8399 PT W/DXA RESULTS DOCUMENT: HCPCS | Performed by: STUDENT IN AN ORGANIZED HEALTH CARE EDUCATION/TRAINING PROGRAM

## 2022-10-07 PROCEDURE — G8420 CALC BMI NORM PARAMETERS: HCPCS | Performed by: STUDENT IN AN ORGANIZED HEALTH CARE EDUCATION/TRAINING PROGRAM

## 2022-10-07 PROCEDURE — 1036F TOBACCO NON-USER: CPT | Performed by: STUDENT IN AN ORGANIZED HEALTH CARE EDUCATION/TRAINING PROGRAM

## 2022-10-07 PROCEDURE — 1123F ACP DISCUSS/DSCN MKR DOCD: CPT | Performed by: STUDENT IN AN ORGANIZED HEALTH CARE EDUCATION/TRAINING PROGRAM

## 2022-10-07 PROCEDURE — G8427 DOCREV CUR MEDS BY ELIG CLIN: HCPCS | Performed by: STUDENT IN AN ORGANIZED HEALTH CARE EDUCATION/TRAINING PROGRAM

## 2022-10-07 PROCEDURE — 99205 OFFICE O/P NEW HI 60 MIN: CPT | Performed by: STUDENT IN AN ORGANIZED HEALTH CARE EDUCATION/TRAINING PROGRAM

## 2022-10-07 RX ORDER — ALPRAZOLAM 0.25 MG/1
0.25 TABLET ORAL 3 TIMES DAILY PRN
Qty: 90 TABLET | Refills: 1 | Status: SHIPPED | OUTPATIENT
Start: 2022-10-07 | End: 2022-12-06

## 2022-10-07 RX ORDER — METOPROLOL SUCCINATE 25 MG/1
TABLET, EXTENDED RELEASE ORAL
Qty: 135 TABLET | Refills: 1 | Status: SHIPPED | OUTPATIENT
Start: 2022-10-07

## 2022-10-07 ASSESSMENT — ANXIETY QUESTIONNAIRES
1. FEELING NERVOUS, ANXIOUS, OR ON EDGE: 3
IF YOU CHECKED OFF ANY PROBLEMS ON THIS QUESTIONNAIRE, HOW DIFFICULT HAVE THESE PROBLEMS MADE IT FOR YOU TO DO YOUR WORK, TAKE CARE OF THINGS AT HOME, OR GET ALONG WITH OTHER PEOPLE: VERY DIFFICULT
2. NOT BEING ABLE TO STOP OR CONTROL WORRYING: 3
GAD7 TOTAL SCORE: 18
3. WORRYING TOO MUCH ABOUT DIFFERENT THINGS: 3
7. FEELING AFRAID AS IF SOMETHING AWFUL MIGHT HAPPEN: 3
5. BEING SO RESTLESS THAT IT IS HARD TO SIT STILL: 2
4. TROUBLE RELAXING: 3
6. BECOMING EASILY ANNOYED OR IRRITABLE: 1

## 2022-10-07 ASSESSMENT — COLUMBIA-SUICIDE SEVERITY RATING SCALE - C-SSRS
2. HAVE YOU ACTUALLY HAD ANY THOUGHTS OF KILLING YOURSELF?: NO
6. HAVE YOU EVER DONE ANYTHING, STARTED TO DO ANYTHING, OR PREPARED TO DO ANYTHING TO END YOUR LIFE?: NO
1. WITHIN THE PAST MONTH, HAVE YOU WISHED YOU WERE DEAD OR WISHED YOU COULD GO TO SLEEP AND NOT WAKE UP?: YES

## 2022-10-07 ASSESSMENT — PATIENT HEALTH QUESTIONNAIRE - PHQ9
1. LITTLE INTEREST OR PLEASURE IN DOING THINGS: 2
SUM OF ALL RESPONSES TO PHQ9 QUESTIONS 1 & 2: 5
3. TROUBLE FALLING OR STAYING ASLEEP: 0
6. FEELING BAD ABOUT YOURSELF - OR THAT YOU ARE A FAILURE OR HAVE LET YOURSELF OR YOUR FAMILY DOWN: 1
7. TROUBLE CONCENTRATING ON THINGS, SUCH AS READING THE NEWSPAPER OR WATCHING TELEVISION: 1
5. POOR APPETITE OR OVEREATING: 2
8. MOVING OR SPEAKING SO SLOWLY THAT OTHER PEOPLE COULD HAVE NOTICED. OR THE OPPOSITE, BEING SO FIGETY OR RESTLESS THAT YOU HAVE BEEN MOVING AROUND A LOT MORE THAN USUAL: 2
SUM OF ALL RESPONSES TO PHQ QUESTIONS 1-9: 15
9. THOUGHTS THAT YOU WOULD BE BETTER OFF DEAD, OR OF HURTING YOURSELF: 1
SUM OF ALL RESPONSES TO PHQ QUESTIONS 1-9: 15
10. IF YOU CHECKED OFF ANY PROBLEMS, HOW DIFFICULT HAVE THESE PROBLEMS MADE IT FOR YOU TO DO YOUR WORK, TAKE CARE OF THINGS AT HOME, OR GET ALONG WITH OTHER PEOPLE: 1
SUM OF ALL RESPONSES TO PHQ QUESTIONS 1-9: 15
SUM OF ALL RESPONSES TO PHQ QUESTIONS 1-9: 14
2. FEELING DOWN, DEPRESSED OR HOPELESS: 3
4. FEELING TIRED OR HAVING LITTLE ENERGY: 3

## 2022-10-07 ASSESSMENT — ENCOUNTER SYMPTOMS
COUGH: 1
WHEEZING: 1
SHORTNESS OF BREATH: 1
NAUSEA: 0
CHEST TIGHTNESS: 1
VOMITING: 0

## 2022-10-11 ENCOUNTER — HOSPITAL ENCOUNTER (OUTPATIENT)
Dept: PHYSICAL THERAPY | Age: 72
Setting detail: RECURRING SERIES
Discharge: HOME | End: 2022-10-14
Payer: MEDICARE

## 2022-10-11 PROCEDURE — 97110 THERAPEUTIC EXERCISES: CPT

## 2022-10-11 PROCEDURE — 97140 MANUAL THERAPY 1/> REGIONS: CPT

## 2022-10-11 NOTE — PROGRESS NOTES
Francisco Pan  : 1950  Primary: Medicare Part A And B  Secondary: [address] @ [address] [address]  Phone: [phone]  Fax: [phone] Plan Frequency: 1-2 visits per week for 8 weeks  Plan of Care/Certification Expiration Date: 22      PT Visit Info:   Visit Count:  5   OUTPATIENT PHYSICAL THERAPY:OP NOTE TYPE: Treatment Note 10/11/2022       Episode  }Appt Desk             Treatment Diagnosis:  Cervicalgia (M54.2)  Medical/Referring Diagnosis:  Cervicalgia [M54.2]  Other chronic pain [G89.29]  Referring Physician:  Soy Rivero MD MD Orders:  PT Eval and Treat   Date of Onset:  Onset Date: 21 (Approximately 1 year ago)     Allergies:   Dexamethasone, Erythromycin, Gluten, and Ciprofloxacin  Restrictions/Precautions:  No data recordedNo data recorded   Interventions Planned (Treatment may consist of any combination of the following):    Current Treatment Recommendations: Strengthening; ROM; Integrated dry needling; Modalities; Patient/Caregiver education & training; Home exercise program; Manual Therapy - Soft Tissue Mobilization; Manual Therapy - Joint Manipulation; Neuromuscular re-education     Subjective Comments:  Has been having less bouts of severe onsets of pain. Was very sore after her last appointment. Has found relief from back/neck pain with chin tucks. Initial:     (Not rated)/10Post Session:        (Not rated - feels looser)/10  Medications Last Reviewed:  10/11/2022  Updated Objective Findings:  None Today  Treatment   THERAPEUTIC EXERCISE: (30 minutes) in order to reduce neck pain and tightness. Stretches to B upper trapezius to reduce tightness and discomfort. Active-assisted PNF diagonals to each upper quarter for anterior elevation/posterior depression and anterior depression/posterior elevation.  Scapular retractions with elbows flexed 3 x 10, and with elbows extended 3 x 10 with blue resistance band. Supine chin tucks 3 x 10. MANUAL THERAPY (15 minutes) to reduce bilateral upper trapezius discomfort. Gentle manual traction and gentle soft tissue mobilizations to each upper trapezius to reduce discomfort. HEP: No changes today    Treatment/Session Summary:    Treatment Assessment:  Good performance with exercises. Demonstrates good form with exercises. Communication/Consultation:  None today  Equipment provided today:  None  Recommendations/Intent for next treatment session: Next visit will focus on reducing cervical pain and tightness.     Total Treatment Billable Duration:  45 minutes  Time In: 5630  Time Out: Princess HANSEN PT       Charge Capture  }Post Session Pain  PT Visit Info  [facility] Portal  MD Guidelines  Scanned Media  Benefits  MyChart    Future Appointments   Date Time Provider Sarai Pozo   10/13/2022  9:30 AM Ramona Youssef, PT [facility]   10/18/2022  9:30 AM Ramona Youssef, PT [facility]   10/20/2022  8:45 AM Swethaa Sous, PTA [facility]   10/25/2022  9:30 AM Ramona Youssef, PT [facility]   10/27/2022  9:30 AM Ramona Youssef, PT [facility]   11/1/2022  8:45 AM Marni Sous, PTA [facility]   11/3/2022  9:30 AM Ramona Youssef, PT [facility]   11/23/2022 10:20 AM DO PATRICIO Ferrer [facility]   3/17/2023  9:30 AM MD RENAY Ring [facility] AM[facility]

## 2022-10-13 ENCOUNTER — HOSPITAL ENCOUNTER (OUTPATIENT)
Dept: PHYSICAL THERAPY | Age: 72
Setting detail: RECURRING SERIES
Discharge: HOME | End: 2022-10-16
Payer: MEDICARE

## 2022-10-13 PROCEDURE — 97110 THERAPEUTIC EXERCISES: CPT

## 2022-10-13 PROCEDURE — 97140 MANUAL THERAPY 1/> REGIONS: CPT

## 2022-10-13 ASSESSMENT — PAIN SCALES - GENERAL: PAINLEVEL_OUTOF10: 0

## 2022-10-13 NOTE — PROGRESS NOTES
Fredrick Judah  : 1950  Primary: Medicare Part A And B  Secondary: [address] @ [address] [address]  Phone: [phone]  Fax: [phone] Plan Frequency: 1-2 visits per week for 8 weeks  Plan of Care/Certification Expiration Date: 22      PT Visit Info:   Visit Count:  6   OUTPATIENT PHYSICAL THERAPY:OP NOTE TYPE: Treatment Note 10/13/2022       Episode  }Appt Desk             Treatment Diagnosis:  Cervicalgia (M54.2)  Medical/Referring Diagnosis:  Cervicalgia [M54.2]  Other chronic pain [G89.29]  Referring Physician:  Sultana Locke MD MD Orders:  PT Eval and Treat   Date of Onset:  Onset Date: 21 (Approximately 1 year ago)     Allergies:   Dexamethasone, Erythromycin, Gluten, and Ciprofloxacin  Restrictions/Precautions:  No data recordedNo data recorded   Interventions Planned (Treatment may consist of any combination of the following):    Current Treatment Recommendations: Strengthening; ROM; Integrated dry needling; Modalities; Patient/Caregiver education & training; Home exercise program; Manual Therapy - Soft Tissue Mobilization; Manual Therapy - Joint Manipulation; Neuromuscular re-education     Subjective Comments:  Reports just a little bit of soreness upon arrival to PT. Initial:    0/10Post Session:        (Not rated)/10  Medications Last Reviewed:  10/13/2022  Updated Objective Findings:  None Today  Treatment   THERAPEUTIC EXERCISE: (25 minutes) in order to reduce neck pain and tightness. Stretches to B upper trapezius to reduce tightness and discomfort. Active-assisted PNF diagonals to each upper quarter for anterior elevation/posterior depression and anterior depression/posterior elevation. Scapular retractions with elbows flexed 3 x 10 using black resistance band. Supine chin tucks 3 x 10. Side-lying middle trap raises 3 x 10 to R UE.  Active-assisted scapular protraction-retraction in side-lying. MANUAL THERAPY (15 minutes) to reduce bilateral upper trapezius discomfort. Gentle manual traction and gentle soft tissue mobilizations to each upper trapezius to reduce discomfort. HEP: No changes today    Treatment/Session Summary:    Treatment Assessment:  Was unable to perform middle trap raises in prone, but was able to perform in side lying. Fatigued with middle trap raises. Not as palpably tight to upper trapezius today. Communication/Consultation:  None today  Equipment provided today:  None  Recommendations/Intent for next treatment session: Next visit will focus on reducing cervical pain and tightness.     Total Treatment Billable Duration:  40 minutes  Time In: 0930  Time Out: 1010    Candice Pardo PT       Charge Capture  }Post Session Pain  PT Visit Info  [facility] Portal  MD Guidelines  Scanned Media  Benefits  MyChart    Future Appointments   Date Time Provider Sarai Pozo   10/18/2022  9:30 AM Jeremie Ruelas, PT [facility]   10/20/2022  8:45 AM TIFFANIE MakiORPTTATO [facility]   10/25/2022  9:30 AM Jeremie Ruelas, PT [facility]   10/27/2022  9:30 AM Jeremie Ruelas, PT [facility]   11/1/2022  8:45 AM Grace Bates PTA [facility]   11/3/2022  9:30 AM Jeremie Ruelas, PT [facility]   11/23/2022 10:20 AM DO PATRICIO Chandler [facility] AMB   3/17/2023  9:30 AM MD RENAY Ruiz [facility] AMB

## 2022-10-18 ENCOUNTER — HOSPITAL ENCOUNTER (OUTPATIENT)
Dept: PHYSICAL THERAPY | Age: 72
Setting detail: RECURRING SERIES
Discharge: HOME | End: 2022-10-21
Payer: MEDICARE

## 2022-10-18 PROCEDURE — 97110 THERAPEUTIC EXERCISES: CPT

## 2022-10-18 PROCEDURE — 97140 MANUAL THERAPY 1/> REGIONS: CPT

## 2022-10-18 NOTE — PROGRESS NOTES
Marco Antonio Lam  : 1950  Primary: Medicare Part A And B  Secondary: [address] @ [address] [address]  Phone: [phone]  Fax: [phone] Plan Frequency: 1-2 visits per week for 8 weeks  Plan of Care/Certification Expiration Date: 22      PT Visit Info:   Visit Count:  7   OUTPATIENT PHYSICAL THERAPY:OP NOTE TYPE: Treatment Note 10/18/2022       Episode  }Appt Desk             Treatment Diagnosis:  Cervicalgia (M54.2)  Medical/Referring Diagnosis:  Cervicalgia [M54.2]  Other chronic pain [G89.29]  Referring Physician:  Shayy Gallardo MD MD Orders:  PT Eval and Treat   Date of Onset:  Onset Date: 21 (Approximately 1 year ago)     Allergies:   Dexamethasone, Erythromycin, Gluten, and Ciprofloxacin  Restrictions/Precautions:  No data recordedNo data recorded   Interventions Planned (Treatment may consist of any combination of the following):    Current Treatment Recommendations: Strengthening; ROM; Integrated dry needling; Modalities; Patient/Caregiver education & training; Home exercise program; Manual Therapy - Soft Tissue Mobilization; Manual Therapy - Joint Manipulation; Neuromuscular re-education     Subjective Comments:  Neck pain is feeling better. Has not been having as many episodes of intense pain. Initial:     (Not rated)/10Post Session:        (Not rated - felt better)/10  Medications Last Reviewed:  10/18/2022  Updated Objective Findings:  None Today  Treatment   THERAPEUTIC EXERCISE: (25 minutes) in order to reduce neck pain and tightness. Stretches to B upper trapezius to reduce tightness and discomfort. Active-assisted PNF diagonals to each upper quarter for anterior elevation/posterior depression and anterior depression/posterior elevation. Scapular retractions with elbows flexed 2 x 15 using black resistance band. Side-lying middle trap raises 3 x 12 to B UE.  Scaption 2# 2 x 10 MANUAL THERAPY (15 minutes) to reduce bilateral upper trapezius discomfort. Gentle manual traction and gentle soft tissue mobilizations to each upper trapezius to reduce discomfort. HEP: No changes today    Treatment/Session Summary:    Treatment Assessment:  She did very well with exercises. Less palpable tightness than she had previously to bilteral upper trapezius. Communication/Consultation:  None today  Equipment provided today:  None  Recommendations/Intent for next treatment session: Next visit will focus on reducing cervical pain and tightness.     Total Treatment Billable Duration:  40 minutes  Time In: 0930  Time Out: 1010    Ana Cristina Stallworth PT       Charge Capture  }Post Session Pain  PT Visit Info  [facility] Portal  MD Guidelines  Scanned Media  Benefits  MyChart    Future Appointments   Date Time Provider Sarai Pozo   10/20/2022  8:45 AM Eileen Dhillon PTA [facility]   10/25/2022  9:30 AM Dannis Kehr, PT [facility]   10/27/2022  9:30 AM Dannis Kehr, PT [facility]   11/1/2022  8:45 AM TIFFANIE [facility]varezORPJOSE [facility]   11/3/2022  9:30 AM Dannis Kehr, PT [facility]   11/23/2022 10:20 AM DO PATRICIO PatriciaL AMB   3/17/2023  9:30 AM MD RENAY Elena [facility] AM[facility]

## 2022-10-20 ENCOUNTER — TELEPHONE (OUTPATIENT)
Dept: INTERNAL MEDICINE CLINIC | Facility: CLINIC | Age: 72
End: 2022-10-20

## 2022-10-20 ENCOUNTER — HOSPITAL ENCOUNTER (OUTPATIENT)
Dept: PHYSICAL THERAPY | Age: 72
Setting detail: RECURRING SERIES
Discharge: HOME | End: 2022-10-23
Payer: MEDICARE

## 2022-10-20 DIAGNOSIS — I67.9 INTRACRANIAL VASCULAR STENOSIS: Primary | ICD-10-CM

## 2022-10-20 PROCEDURE — 97140 MANUAL THERAPY 1/> REGIONS: CPT

## 2022-10-20 PROCEDURE — 97110 THERAPEUTIC EXERCISES: CPT

## 2022-10-20 NOTE — TELEPHONE ENCOUNTER
----- Message from Hansa Coronado DO sent at 10/20/2022 10:04 AM EDT -----  Regarding: FW: Neuroimaging  Please call patient and let her know that I have discussed her recent head imaging with her neurologist. We agree with continuing her daily aspirin. No additional imaging needed at this time. He and I would both recommend at least a low dose cholesterol medication (statin) to help with plaque buildup and lower her risk for heart attack and stroke. Potential side effects can include muscle pain or constipation but we would start her on a low dose to see how she tolerates it. If agreeable please let me know and I will place a referral. Thanks! Jackqueline Button   ----- Message -----  From: Wyatt Rinne, MD  Sent: 10/20/2022   9:30 AM EDT  To: Hansa Coronado DO  Subject: RE: Neuroimaging                                 Sorry for the delay. I think aspirin alone should be sufficient for stroke prevention. I do think a statin, even a low intensity one, would be reasonable. I do not feel strongly that any other additional imaging is needed at this time but I we will address it with her when I see her back in follow-up. Devyn Avendaño MD  [address] [place] [address] Norman Colby, [address] [place] [address]  Ph: [phone]  Fax: [phone]        ----- Message -----  From: Hansa Coronado DO  Sent: 10/7/2022   5:55 PM EDT  To: Wyatt Rinne, MD  Subject: Neuroimaging                                     Dr. Day Ribeiro,    I was hoping to touch base regarding a mutual patient, Mrs. Leticia Rivers whom you follow for newly diagnosed Parkinson's disease. I saw her for the first time today to establish care. She was seen in the emergency room yesterday for vertigo/dizziness.   A CTA of the head and neck was done showing what appeared to be fibromuscular dysplasia versus mild to moderate stenosis at the left vertebral artery as well as a small focus of encephalomalacia in the posterior left frontal lobe. I understand she has a history of a questionable TIA from 2020. Continues to remain on aspirin. However given her recent vascular findings I was wondering if you agreed with starting her on a statin and if she needed any additional imaging or antiplatelet therapy? Thanks for all your help!     Mauro Karimi

## 2022-10-20 NOTE — TELEPHONE ENCOUNTER
Pt advised  Per Dr Racquel Medeiros   Please call patient and let her know that I have discussed her recent head imaging with her neurologist. We agree with continuing her daily aspirin. No additional imaging needed at this time. He and I would both recommend at least a low dose cholesterol medication (statin) to help with plaque buildup and lower her risk for heart attack and stroke. Potential side effects can include muscle pain or constipation but we would start her on a low dose to see how she tolerates it. If agreeable please let me know and I will place a referral.   Pt ok with you sending in Med. Pt advised you were already gone this afternoon and you're off tomorrow so it may be Mon before Med sent to pharm.

## 2022-10-20 NOTE — PROGRESS NOTES
Wilmar Shoemaker  : 1950  Primary: Medicare Part A And B  Secondary: [address] @ [address] [address]  Phone: [phone]  Fax: [phone] Plan Frequency: 1-2 visits per week for 8 weeks  Plan of Care/Certification Expiration Date: 22      PT Visit Info:   Visit Count:  8   OUTPATIENT PHYSICAL THERAPY:OP NOTE TYPE: Treatment Note 10/20/2022       Episode  }Appt Desk             Treatment Diagnosis:  Cervicalgia (M54.2)  Medical/Referring Diagnosis:  Cervicalgia [M54.2]  Other chronic pain [G89.29]  Referring Physician:  Kelly Bartholomew MD MD Orders:  PT Eval and Treat   Date of Onset:  Onset Date: 21 (Approximately 1 year ago)     Allergies:   Dexamethasone, Erythromycin, Gluten, and Ciprofloxacin  Restrictions/Precautions:  No data recordedNo data recorded   Interventions Planned (Treatment may consist of any combination of the following):    Current Treatment Recommendations: Strengthening; ROM; Integrated dry needling; Modalities; Patient/Caregiver education & training; Home exercise program; Manual Therapy - Soft Tissue Mobilization; Manual Therapy - Joint Manipulation; Neuromuscular re-education     Subjective Comments:  Pt reports less tingling in the left hand but reports moderate levels of neck pain today. Initial:     moderate neck pain Post Session:        no increase reported  Medications Last Reviewed:  10/20/2022  Updated Objective Findings:  None Today  Treatment   THERAPEUTIC EXERCISE: (25 minutes) in order to reduce neck pain and tightness. Stretches to B upper trapezius to reduce tightness and discomfort.     -supine chin tucks with tactile cues x15  -cervical AROM flexion/extension x15 seated  -side lying middle trap 2x10 B 1# with tactile cues  -seated scapula retraction 2x10 B elbows flexed      MANUAL THERAPY (15 minutes) to reduce bilateral upper trapezius discomfort.  Gentle manual traction and gentle cervical PROM to reduce discomfort. Pt was in supine. HEP: No changes today    Treatment/Session Summary:    Treatment Assessment: Pt reported mild dizziness transferring from supine to sitting. She fatigues easily with middle trap exercises but is able to correct her posture with verbal cues. Communication/Consultation:  None today  Equipment provided today:  None  Recommendations/Intent for next treatment session: Next visit will focus on reducing cervical pain and tightness.     Total Treatment Billable Duration:  40 minutes  Time In: 1544  Time Out: 0930    Elizabet Benitez PTA       Charge Capture  }Post Session Pain  PT Visit Info  [facility] Portal  MD Guidelines  Scanned Media  Benefits  MyChart    Future Appointments   Date Time Provider Sarai Pozo   10/25/2022  9:30 AM Moriah Mota PT [facility]   10/27/2022  9:30 AM [facility]   11/1/2022  8:45 AM Elizabet Benitez PTA [facility]   11/3/2022  9:30 AM STEVE Rubalcava [facility]   11/23/2022 10:20 AM DO PATRICIO BatistaL AMB   3/17/2023  9:30 AM Wyatt Rinne, MD [facility]

## 2022-10-23 RX ORDER — ROSUVASTATIN CALCIUM 5 MG/1
5 TABLET, COATED ORAL DAILY
Qty: 90 TABLET | Refills: 2 | Status: SHIPPED | OUTPATIENT
Start: 2022-10-23

## 2022-10-23 NOTE — TELEPHONE ENCOUNTER
Prescription for crestor sent to pharmacy.      Orders Placed This Encounter    rosuvastatin (CRESTOR) 5 MG tablet     Sig: Take 1 tablet by mouth daily     Dispense:  90 tablet     Refill:  2

## 2022-10-25 ENCOUNTER — HOSPITAL ENCOUNTER (OUTPATIENT)
Dept: PHYSICAL THERAPY | Age: 72
Setting detail: RECURRING SERIES
Discharge: HOME | End: 2022-10-28
Payer: MEDICARE

## 2022-10-25 PROCEDURE — 97140 MANUAL THERAPY 1/> REGIONS: CPT

## 2022-10-25 PROCEDURE — 97110 THERAPEUTIC EXERCISES: CPT

## 2022-10-25 NOTE — THERAPY RECERTIFICATION
Yari Moonland  : 1950  Primary: Medicare Part A And B  Secondary: [address] @ [address] [address]  Phone: [phone]  Fax: [phone] Plan Frequency: 1-2 visits per week for 8 weeks  Frequency: 1-2 visits per week for 8 weeks  . Plan of Care/Certification Expiration Date: 22      PT Visit Info:         Visit Count:  9    OUTPATIENT PHYSICAL THERAPY:OP NOTE TYPE: Progress Report 10/25/2022               Episode  Appt Desk         Treatment Diagnosis:  Cervicalgia (M54.2)  Medical/Referring Diagnosis:  Cervicalgia [M54.2]  Other chronic pain [G89.29]  Referring Physician:  Demetris Ambriz MD MD Orders:  PT Eval and Treat   Return MD Appt:  TBD  Date of Onset:  Onset Date: 21 (Approximately 1 year ago)     Allergies:  Dexamethasone, Erythromycin, Gluten, and Ciprofloxacin  Restrictions/Precautions:         Medications Last Reviewed:  10/25/2022           OBJECTIVE      Sensation:   Tender to palpation of B upper trapezius and B SCM  Cervical:  AROM Cervical Spine   Cervical spine general AROM: Rotation to the R 70%, rotation to the left 60%. Shoulder:  AROM Shoulder (Degrees)  R Shoulder Flexion 0-180: 125  L Shoulder Flexion 0-180: 110    ASSESSMENT   Progress/Recertification Assessment:  Patient has attended 9 PT appointments to address neck pain and upper trapezius tightness. She reports progress with neck spasms but that they still occur. Patient's remains limited with L shoulder AROM. Patient will benefit from continued PT to continue to improve her cervical tightness and discomfort and improve her shoulder ROM.        PLAN   Effective Dates: 10/25/2022 TO Plan of Care/Certification Expiration Date: 22     Frequency/Duration: Plan Frequency: 1-2 visits per week for 8 weeks     Interventions Planned (Treatment may consist of any combination of the following):    Current Treatment Recommendations: Strengthening; ROM; Integrated dry needling; Modalities; Patient/Caregiver education & training; Home exercise program; Manual Therapy - Soft Tissue Mobilization; Manual Therapy - Joint Manipulation; Neuromuscular re-education     Goals: (Goals have been discussed and agreed upon with patient.)    Discharge Goals: Time Frame: 8 weeks  Patient will report cessation of episodes of neck \"locking down\" with sneezing or anxiety. Ongoing, progressing. Frequency has decreased but still has them. 10/25/22  Patient will be independent with HEP. MET 10/25/22  Patient will improve score on NDI to <10% limited. Patient will have 125 degrees of active shoulder forward elevation bilaterally. Ongoing 10/25/22  Patient will have symmetrical cervical rotation range of motion without increased discomfort. Ongoing, 10/25/22         Outcome Measure: Tool Used: Neck Disability Index (NDI)  Score:  Initial: 10/50 or 20% limited Most Recent: 11/50 (Date: 10/26/22 )   Interpretation of Score: The Neck Disability Index is a revised form of the Oswestry Low Back Pain Index and is designed to measure the activities of daily living in person's with neck pain. Each section is scored on a 0-5 scale, 5 representing the greatest disability. The scores of each section are added together for a total score of 50. Medical Necessity:   > Skilled intervention continues to be required due to chronic neck pain and subsequent reduction in quality of life. Reason For Services/Other Comments:  > Patient continues to require skilled intervention due to chronic neck pain and subsequent deficits with functional activity. Regarding Lanelle Jordon therapy, I certify that the treatment plan above will be carried out by a therapist or under their direction.   Thank you for this referral,    Charlie Hooker PT       Referring Physician Signature: Madhavi Silva MD _______________________________ Date _____________        Autumn Morris Session Pain  Charge Capture  PT Visit Info MD Guidelines  Ciara

## 2022-10-25 NOTE — PROGRESS NOTES
Edwin London  : 1950  Primary: Medicare Part A And B  Secondary: [address]  @ [address] [address]  Phone: [phone]  Fax: [phone] Plan Frequency: 1-2 visits per week for 8 weeks  Plan of Care/Certification Expiration Date: 22      PT Visit Info:   Visit Count:  9   OUTPATIENT PHYSICAL THERAPY:OP NOTE TYPE: Treatment Note 10/25/2022       Episode  }Appt Desk             Treatment Diagnosis:  Cervicalgia (M54.2)  Medical/Referring Diagnosis:  Cervicalgia [M54.2]  Other chronic pain [G89.29]  Referring Physician:  Ashley Plummer MD MD Orders:  PT Eval and Treat   Date of Onset:  Onset Date: 21 (Approximately 1 year ago)     Allergies:   Dexamethasone, Erythromycin, Gluten, and Ciprofloxacin  Restrictions/Precautions:  No data recordedNo data recorded   Interventions Planned (Treatment may consist of any combination of the following):    Current Treatment Recommendations: Strengthening; ROM; Integrated dry needling; Modalities; Patient/Caregiver education & training; Home exercise program; Manual Therapy - Soft Tissue Mobilization; Manual Therapy - Joint Manipulation; Neuromuscular re-education     Subjective Comments:  Pt reports that neck feels stiff. Pain isnt too bad but is stiff. Initial:     2/10 Post Session:        Not rated  Medications Last Reviewed:  10/25/2022  Updated Objective Findings:   See progress report  Treatment   THERAPEUTIC EXERCISE: (25 minutes) in order to reduce neck pain and tightness. Stretches to B upper trapezius to reduce tightness and discomfort. R shoulder diagonals in side lying to reduce tightness to R upper traepzius.     -supine nods x 15  -Shoulder wand flexion AAROM 2 x 10  -Scapular retractions black 2 x 15, with elbows flexed  -Scapular retractions black 2 x 15 with elbows extended  - Doorway stretch 6 x 10 seconds      MANUAL THERAPY (15 minutes) to reduce bilateral upper trapezius discomfort. Gentle manual traction and gentle cervical PROM to reduce discomfort. Pt was in supine. HEP: No changes today    Treatment/Session Summary:    Treatment Assessment: Patient did well with exercises. More stiff and limited to SCM bilaterally today. Communication/Consultation:  None today  Equipment provided today:  None  Recommendations/Intent for next treatment session: Next visit will focus on reducing cervical pain and tightness.     Total Treatment Billable Duration:  40 minutes  Time In: 0930  Time Out: 1015    Carol Ann Omalley PT       Charge Capture  }Post Session Pain  PT Visit Info  [employer] Portal  MD Guidelines  Scanned Media  Benefits  MyChart    Future Appointments   Date Time Provider Sarai Pozo   10/27/2022  9:30 AM Ramona Youssef, PT [facility]   11/1/2022  8:45 AM Marni Chisholm PTA [facility] [place] [place]   11/3/2022  9:30 AM Ramona Youssef PT [facility] [place] [place]   11/23/2022 10:20 AM DO PATRICIO Ferrer AMB   3/17/2023  9:30 AM MD RENAY Ring [facility] AMB

## 2022-10-27 ENCOUNTER — HOSPITAL ENCOUNTER (OUTPATIENT)
Dept: PHYSICAL THERAPY | Age: 72
Setting detail: RECURRING SERIES
Discharge: HOME | End: 2022-10-30
Payer: MEDICARE

## 2022-10-27 PROCEDURE — 97140 MANUAL THERAPY 1/> REGIONS: CPT

## 2022-10-27 PROCEDURE — 97110 THERAPEUTIC EXERCISES: CPT

## 2022-10-27 NOTE — PROGRESS NOTES
Zane Favorite  : 1950  Primary: Medicare Part A And B  Secondary: [address] @ [address] [address]  Phone: [phone]  Fax: [phone] Plan Frequency: 1-2 visits per week for 8 weeks  Plan of Care/Certification Expiration Date: 22      PT Visit Info:   Visit Count:  10   OUTPATIENT PHYSICAL THERAPY:OP NOTE TYPE: Treatment Note 10/27/2022       Episode  }Appt Desk             Treatment Diagnosis:  Cervicalgia (M54.2)  Medical/Referring Diagnosis:  Cervicalgia [M54.2]  Other chronic pain [G89.29]  Referring Physician:  Gregorio Cisse MD MD Orders:  PT Eval and Treat   Date of Onset:  Onset Date: 21 (Approximately 1 year ago)     Allergies:   Dexamethasone, Erythromycin, Gluten, and Ciprofloxacin  Restrictions/Precautions:  No data recordedNo data recorded   Interventions Planned (Treatment may consist of any combination of the following):    Current Treatment Recommendations: Strengthening; ROM; Integrated dry needling; Modalities; Patient/Caregiver education & training; Home exercise program; Manual Therapy - Soft Tissue Mobilization; Manual Therapy - Joint Manipulation; Neuromuscular re-education     Subjective Comments:  Pain isn't bad but the neck does feel stiff. Initial:     Not rated but stiff Post Session:        Not rated, but feels looser  Medications Last Reviewed:  10/27/2022  Updated Objective Findings:  None today  Treatment   THERAPEUTIC EXERCISE: (15 minutes) in order to reduce neck pain and tightness. Stretches to B upper trapezius to reduce tightness and discomfort. R shoulder diagonals in side lying to reduce tightness to R upper traepzius.  Passive range of motion to each shoulder for reduced tightness and discomfort.    -supine cervical nods x 15  -Scapular retractions black 3 x 10, with elbows flexed with cervical retractions  -Scapular retractions black 2 x 15 with elbows extended    MANUAL THERAPY (25 minutes) to reduce bilateral upper trapezius discomfort. Gentle manual traction and gentle cervical PROM to reduce discomfort. Soft tissue mobilizations to B cervical paraspinals and R upper trapezius. HEP: No changes today    Treatment/Session Summary:    Treatment Assessment: Did well with exercises. Significant tightness to each upper trapezius initially that lessened. Communication/Consultation:  None today  Equipment provided today:  None  Recommendations/Intent for next treatment session: Next visit will focus on reducing cervical pain and tightness.     Total Treatment Billable Duration:  40 minutes  Time In: 0930  Time Out: 1015    Anusha Yip, PT       Charge Capture  }Post Session Pain  PT Visit Info  [employer] Portal  MD Guidelines  Scanned Media  Benefits  MyChart    Future Appointments   Date Time Provider Sarai Pozo   11/1/2022  8:45 AM Dawood Villalta PTA [facility]   11/3/2022  9:30 AM Prudence Garces PT [facility]   11/23/2022 10:20 AM DO PATRICIO Noble [facility] AMB   3/17/2023  9:30 AM Lauretha Meigs, MD [facility]VL AMB

## 2022-10-31 ENCOUNTER — TELEPHONE (OUTPATIENT)
Dept: INTERNAL MEDICINE CLINIC | Facility: CLINIC | Age: 72
End: 2022-10-31

## 2022-10-31 NOTE — TELEPHONE ENCOUNTER
Pt sts she tried taking the statin that you put her on but it made her feel very weak and dopey feeling so she stopped med.

## 2022-11-01 ENCOUNTER — HOSPITAL ENCOUNTER (OUTPATIENT)
Dept: PHYSICAL THERAPY | Age: 72
Setting detail: RECURRING SERIES
Discharge: HOME | End: 2022-11-04
Payer: MEDICARE

## 2022-11-01 PROCEDURE — 97140 MANUAL THERAPY 1/> REGIONS: CPT

## 2022-11-01 PROCEDURE — 97110 THERAPEUTIC EXERCISES: CPT

## 2022-11-01 NOTE — PROGRESS NOTES
Sincere Dowd  : 1950  Primary: Medicare Part A And B  Secondary: [address] @ [facility] [place]  [address] [place] [address] Way [address]  Phone: [phone]  Fax: [phone] Plan Frequency: 1-2 visits per week for 8 weeks  Plan of Care/Certification Expiration Date: 22      PT Visit Info:   Visit Count:  11   OUTPATIENT PHYSICAL THERAPY:OP NOTE TYPE: Treatment Note 2022       Episode  }Appt Desk             Treatment Diagnosis:  Cervicalgia (M54.2)  Medical/Referring Diagnosis:  Cervicalgia [M54.2]  Other chronic pain [G89.29]  Referring Physician:  Flakito Sanchez MD MD Orders:  PT Eval and Treat   Date of Onset:  Onset Date: 21 (Approximately 1 year ago)     Allergies:   Dexamethasone, Erythromycin, Gluten, and Ciprofloxacin  Restrictions/Precautions:  No data recordedNo data recorded   Interventions Planned (Treatment may consist of any combination of the following):    Current Treatment Recommendations: Strengthening; ROM; Integrated dry needling; Modalities; Patient/Caregiver education & training; Home exercise program; Manual Therapy - Soft Tissue Mobilization; Manual Therapy - Joint Manipulation; Neuromuscular re-education     Subjective Comments:  Pt states \"The neck is tight and stiff, a little painful. \"     Initial:     mild pain and tightness in the cervical spine Post Session:        Pt reported less tightness. Medications Last Reviewed:  2022  Updated Objective Findings:  None today  Treatment   THERAPEUTIC EXERCISE: (15 minutes) in order to reduce neck pain and tightness. Exercises below to increase cervical AROM and improve posture. Pt required verbal and visual cues to perform the exercises with good body mechanics.     -supine cervical retraction x 15  -Scapular retractions black x15, with elbows flexed with cervical retractions  -Scapular retractions black  x 15 with elbows extended  -chin tuck against wall with scapula depression and B ER of the shoulders    MANUAL THERAPY (25 minutes) to reduce bilateral upper trapezius discomfort. Gentle manual traction and gentle cervical PROM to reduce discomfort in supine. Soft tissue mobilizations to B cervical paraspinals and R upper trapezius (pt was seated). HEP: No changes today    Treatment/Session Summary:    Treatment Assessment: Pt's right trapezius was tight and painful but pt reported relief post treatment. Pt is right handed and the tremors are worse on the R side. Continue to review posture with pt to decrease pain. Communication/Consultation:  None today  Equipment provided today:  None  Recommendations/Intent for next treatment session: Next visit will focus on reducing cervical pain and tightness.     Total Treatment Billable Duration:  40 minutes  Time In: 2707  Time Out: [address], PTA       Charge Capture  }Post Session Pain  PT Visit Info  [employer] Portal  MD Guidelines  Scanned Media  Benefits  MyChart    Future Appointments   Date Time Provider Sarai Pozo   11/3/2022  9:30 AM Bobbi Wilde PT [facility]   11/23/2022 10:20 AM DO PATRICIO Katz AMB   3/17/2023  9:30 AM MD RENAY Pérez GVL AMB

## 2022-11-03 ENCOUNTER — HOSPITAL ENCOUNTER (OUTPATIENT)
Dept: PHYSICAL THERAPY | Age: 72
Setting detail: RECURRING SERIES
Discharge: HOME | End: 2022-11-06
Payer: MEDICARE

## 2022-11-03 PROCEDURE — 97140 MANUAL THERAPY 1/> REGIONS: CPT

## 2022-11-03 PROCEDURE — 97110 THERAPEUTIC EXERCISES: CPT

## 2022-11-03 NOTE — PROGRESS NOTES
Arden Henriquez  : 1950  Primary: Medicare Part A And B  Secondary: [address] @ [address] [address]  Phone: [phone]  Fax: [phone] Plan Frequency: 1-2 visits per week for 8 weeks  Plan of Care/Certification Expiration Date: 22      PT Visit Info:   Visit Count:  12   OUTPATIENT PHYSICAL THERAPY:OP NOTE TYPE: Treatment Note 11/3/2022       Episode  }Appt Desk             Treatment Diagnosis:  Cervicalgia (M54.2)  Medical/Referring Diagnosis:  Cervicalgia [M54.2]  Other chronic pain [G89.29]  Referring Physician:  Madhavi Silva MD MD Orders:  PT Eval and Treat   Date of Onset:  Onset Date: 21 (Approximately 1 year ago)     Allergies:   Dexamethasone, Erythromycin, Gluten, and Ciprofloxacin  Restrictions/Precautions:  No data recordedNo data recorded   Interventions Planned (Treatment may consist of any combination of the following):    Current Treatment Recommendations: Strengthening; ROM; Integrated dry needling; Modalities; Patient/Caregiver education & training; Home exercise program; Manual Therapy - Soft Tissue Mobilization; Manual Therapy - Joint Manipulation; Neuromuscular re-education     Subjective Comments:  Is not having any neck pain today. Was having pain to L side of neck prior to coming to PT but this has not been an issue recently. Initial:     No pain, but stiff Post Session:        Felt ok, no pain. Fatigued. Medications Last Reviewed:  11/3/2022  Updated Objective Findings:  None today  Treatment   THERAPEUTIC EXERCISE: (30 minutes) in order to reduce neck pain and tightness. Exercises below to increase cervical AROM and improve posture. Pt required verbal and visual cues to perform the exercises with good body mechanics. Passive ranging to each shoulder for flexion, abduction, IR and ER for improved shoulder motion bilaterally.     - Scapular retractions, black band, 3 x 12 with cervical nods  - Side lying middle trap lifts 3 x 10 R  - Bilateral wall slides 3 x 10   - Standing cervical retractions 3 x 10    MANUAL THERAPY (10 minutes) to reduce bilateral upper trapezius discomfort. Gentle manual traction and gentle cervical PROM to reduce discomfort in supine. Soft tissue mobilizations to B cervical paraspinals and R upper trapezius. Grade 2-3 glenohumeral inferior and posterior joint mobilizations to each shoulder for improved shoulder motion. HEP: No changes today    Treatment/Session Summary:    Treatment Assessment: Did well with exercises. Improved endurance with midtrap lifts. Communication/Consultation:  None today  Equipment provided today:  None  Recommendations/Intent for next treatment session: Next visit will focus on reducing cervical pain and tightness. Will reduce frequency to one visit per week due to progress.     Total Treatment Billable Duration:  40 minutes  Time In: 0930  Time Out: 1010    Yanci Brenner PT       Charge Capture  }Post Session Pain  PT Visit Info  [employer] Portal  MD Guidelines  Scanned Media  Benefits  MyChart    Future Appointments   Date Time Provider Sarai Pozo   11/9/2022 11:30 AM Vane Flowers PT [facility]   11/17/2022 11:00 AM Indy Murrell PTA [facility]   11/23/2022 10:20 AM DO PATRICIO Martinez AMB   11/23/2022  1:45 PM STEVE Rosario MYRIAM   11/30/2022  9:45 AM STEVE Rosario [facility]   3/17/2023  9:30 AM Nadege White MD [facility] (2-) Neurology -

## 2022-11-09 ENCOUNTER — HOSPITAL ENCOUNTER (OUTPATIENT)
Dept: PHYSICAL THERAPY | Age: 72
Setting detail: RECURRING SERIES
Discharge: HOME | End: 2022-11-12
Payer: MEDICARE

## 2022-11-09 PROCEDURE — 97140 MANUAL THERAPY 1/> REGIONS: CPT

## 2022-11-09 PROCEDURE — 97110 THERAPEUTIC EXERCISES: CPT

## 2022-11-09 ASSESSMENT — PAIN SCALES - GENERAL: PAINLEVEL_OUTOF10: 0

## 2022-11-09 NOTE — PROGRESS NOTES
Maciel Sullivan  : 1950  Primary: Medicare Part A And B  Secondary: [address] @ [address] [address]  Phone: [phone]  Fax: [phone] Plan Frequency: 1-2 visits per week for 8 weeks  Plan of Care/Certification Expiration Date: 22      PT Visit Info:   Visit Count:  13   OUTPATIENT PHYSICAL THERAPY:OP NOTE TYPE: Treatment Note 2022       Episode  }Appt Desk             Treatment Diagnosis:  Cervicalgia (M54.2)  Medical/Referring Diagnosis:  Cervicalgia [M54.2]  Other chronic pain [G89.29]  Referring Physician:  Shayy Hopson MD MD Orders:  PT Eval and Treat   Date of Onset:  Onset Date: 21 (Approximately 1 year ago)     Allergies:   Dexamethasone, Erythromycin, Gluten, and Ciprofloxacin  Restrictions/Precautions:  No data recordedNo data recorded   Interventions Planned (Treatment may consist of any combination of the following):    Current Treatment Recommendations: Strengthening; ROM; Integrated dry needling; Modalities; Patient/Caregiver education & training; Home exercise program; Manual Therapy - Soft Tissue Mobilization; Manual Therapy - Joint Manipulation; Neuromuscular re-education     Subjective Comments:    Pt does not report any significant changes in pain. Initial:    0/10 Post Session:        mild increase in shoulder pain and fatigue  Medications Last Reviewed:  2022  Updated Objective Findings:  None today  Treatment   THERAPEUTIC EXERCISE: (25 minutes) in order to reduce neck pain and tightness. Exercises below to increase cervical AROM and improve posture. Pt required verbal and visual cues to perform the exercises with good body mechanics. Passive ranging to each shoulder for flexion, abduction, IR and ER for improved shoulder motion bilaterally.     - Scapular retractions with cervical nods 2x10  - Side lying middle trap lifts 2 x 10 B  - Bilateral wall slides x10 each with weight shift forward  - Seated cervical retractions x15  -side lying ER x10 B UE    MANUAL THERAPY (20 minutes) to reduce bilateral upper trapezius discomfort. Gentle manual traction and gentle cervical PROM to reduce discomfort in supine. Soft tissue mobilizations to B cervical paraspinals and B upper trapezius. HEP: No changes today    Treatment/Session Summary:    Treatment Assessment: Pt fatigued easily with middle trapezius exercises. She also reported dizziness rolling over on the mat table. Communication/Consultation:  None today  Equipment provided today:  None  Recommendations/Intent for next treatment session: Next visit will focus on reducing cervical pain and tightness. Will reduce frequency to one visit per week due to progress.     Total Treatment Billable Duration:  45 minutes  Time In: 2812  Time Out: 1200    Justina Ruby PTA       Charge Capture  }Post Session Pain  PT Visit Info  [employer] Portal  MD Guidelines  Scanned Media  Benefits  MyChart    Future Appointments   Date Time Provider Sarai Pozo   11/17/2022 11:00 AM Justina Ruby PTA [facility]   11/23/2022 10:20 AM Gino Florez DO MAT [facility]VL AMB   11/23/2022  1:45 PM Reed Nice, PT [facility]   11/30/2022  9:45 AM [facility] Nice[facility] [place]   3/17/2023  9:30 AM Anahi Palafox MD [facility] (2-RH) Neurology -

## 2022-11-17 ENCOUNTER — HOSPITAL ENCOUNTER (OUTPATIENT)
Dept: PHYSICAL THERAPY | Age: 72
Setting detail: RECURRING SERIES
Discharge: HOME | End: 2022-11-20
Payer: MEDICARE

## 2022-11-17 PROCEDURE — 97140 MANUAL THERAPY 1/> REGIONS: CPT

## 2022-11-17 PROCEDURE — 97110 THERAPEUTIC EXERCISES: CPT

## 2022-11-17 NOTE — PROGRESS NOTES
Leticia Rivers  : 1950  Primary: Medicare Part A And B  Secondary: [address] @ [address] [address]  Phone: [phone]  Fax: [phone] Plan Frequency: 1-2 visits per week for 8 weeks  Plan of Care/Certification Expiration Date: 22      PT Visit Info:   Visit Count:  14   OUTPATIENT PHYSICAL THERAPY:OP NOTE TYPE: Treatment Note 2022       Episode  }Appt Desk             Treatment Diagnosis:  Cervicalgia (M54.2)  Medical/Referring Diagnosis:  Cervicalgia [M54.2]  Other chronic pain [G89.29]  Referring Physician:  Vania Pathak MD MD Orders:  PT Eval and Treat   Date of Onset:  Onset Date: 21 (Approximately 1 year ago)     Allergies:   Dexamethasone, Erythromycin, Gluten, and Ciprofloxacin  Restrictions/Precautions:  No data recordedNo data recorded   Interventions Planned (Treatment may consist of any combination of the following):    Current Treatment Recommendations: Strengthening; ROM; Integrated dry needling; Modalities; Patient/Caregiver education & training; Home exercise program; Manual Therapy - Soft Tissue Mobilization; Manual Therapy - Joint Manipulation; Neuromuscular re-education     Subjective Comments:    Pt reports no pain at rest.  Initial:     0/10 Post Session:        mild increase in shoulder fatigue  Medications Last Reviewed:  2022  Updated Objective Findings:  None today  Treatment   THERAPEUTIC EXERCISE: (25 minutes) in order to reduce neck pain and tightness. Exercises below to increase cervical AROM and improve posture. Pt required verbal and visual cues to perform the exercises with good body mechanics. Pt education on the importance of scapula position with over head motions.  Pt verbalized understanding     Date:  22 Date:   Date:     Activity/Exercise Parameters Parameters Parameters   Side lying middle trap Bilateral 1# 2x10 guided     Side lying lower trap  Bilateral 2x10 guided     Side lying ER  Bilateral 2x10 ,1# guided     Supine chin tucks x15     Seated scapula retraction for posture 2x10                     MANUAL THERAPY (20 minutes) to reduce bilateral upper trapezius discomfort and increase cervical AROM. Gentle manual traction and gentle cervical PROM to reduce discomfort in supine. Occipital release to performed. HEP: No changes today    Treatment/Session Summary:    Treatment Assessment: Pt reported B shoulder fatigue but she did not report increased pain. She reports dizziness transferring from supine to sitting. Pt requires frequent verbal and tactile cues to perform the exercises with correct body mechanics. Continue to progress UE strengthening. Communication/Consultation:  None today  Equipment provided today:  None  Recommendations/Intent for next treatment session: Next visit will focus on reducing cervical pain and tightness. Will reduce frequency to one visit per week due to progress.     Total Treatment Billable Duration:  45 minutes  Time In: 1100  Time Out: [address] [place], [facility]       Charge Capture  }Post Session Pain  PT Visit Info  [employer] Portal  MD Guidelines  Scanned Media  Benefits  MyChart    Future Appointments   Date Time Provider Sarai Pozo   11/23/2022 10:20 AM DO PATRICIO Rico [facility] AMB   11/23/2022  1:45 PM Veronica Persaud PT [facility]   11/30/2022  9:45 AM [facility]   3/17/2023  9:30 AM Jolanta Ceja MD [facility] ([facility] Neurology -

## 2022-11-19 NOTE — PROGRESS NOTES
Lexii Morataya (:  1950) is a 81 y.o. female,Established patient, here for evaluation of the following chief complaint(s):  Follow-up (6 week fu  on neck pain and parkinsons and discuss med), Cholesterol Problem, and Ear Problem         ASSESSMENT/PLAN:  1. Spinal stenosis, cervical region  MRI cervical spine from 2021 with multilevel DDD most evident at C5-6 resulting in mild central spinal stenosis; mild central spinal stenosis also evident at C6-7  Continue physical therapy    2. 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 Last notes reviewed with patient declining medication at this time    3. Palpitations  EKG at last visit showed NSR, normal axis, no acute ST segment or T wave abnormalities  Magnesium and potassium within normal limits on 10/6/2022  Continue metoprolol succinate, due for cardiology follow up     4. Anxiety and depression  Refer to psychiatry for cognitive behavioral therapy   Continue as needed alprazolam    - External Referral to Psychiatry    5.  Intracranial atherosclerosis  CTA of the head and neck on 10/6/2022 with beadlike appearance of cervical left vertebral artery at the level of C1-2 (mild to moderate stenoses appear to be present in this area); small focus of encephalomalacia in the posterior left frontal lobe, unchanged from prior  Echocardiogram on 3/16/2020 with a EF of 55 to 60%, no evidence of right to left shunt with provocative testing  Have discussed case with patient's cardiologist who recommended continuing antiplatelet therapy with aspirin as well as initiating statin therapy  Have since started patient on trial of rosuvastatin however developed intolerance. Start trial of pravastatin     - pravastatin (PRAVACHOL) 20 MG tablet; Take 1 tablet by mouth at bedtime  Dispense: 90 tablet; Refill: 1    6. Dysfunction of both eustachian tubes  Recommended increasing Flonase to 2 sprays per nostril once daily  Continue Allegra however if decreasing efficacy okay to switch to alternative antihistamine      Return in about 3 months (around 2/23/2023) for EOV . Subjective   SUBJECTIVE/OBJECTIVE:  Patient is a 72-year-old  female who presents to the office today for follow-up. Patient had to stop rosuvastatin prescribed at last visit stating it made her feel lightheaded, flushed and dopey. Would be agreeable to trying another statin. Has been having some chronic bilateral ear pressure with nasal congestion and some postnasal drip but no associated ear pain or discharge. No sinus pressure, fevers, chills. Notes occasional mild wheeze but no chest pain or shortness of breath. Has occasional bouts of feeling lightheaded with history of vertigo. Review of Systems   Constitutional:  Negative for chills and fever. HENT:  Positive for congestion and postnasal drip. Negative for ear discharge, ear pain, sinus pressure and sinus pain. Positive for bilateral ear pressure   Respiratory:  Positive for wheezing (Mild, occasional). Negative for shortness of breath. Cardiovascular:  Positive for leg swelling (bilateral). Negative for chest pain. Skin:         Positive for flushing   Neurological:  Positive for light-headedness. Objective   Physical Exam  Vitals reviewed. Constitutional:       General: She is not in acute distress. Appearance: Normal appearance. She is not ill-appearing, toxic-appearing or diaphoretic. HENT:      Head: Normocephalic and atraumatic. Right Ear: Ear canal and external ear normal. There is no impacted cerumen. Left Ear: Tympanic membrane, ear canal and external ear normal. There is no impacted cerumen. Ears:      Comments: Questionable effusion behind right TM  Bilateral TMs without erythema or bulging     Nose: Congestion (mild) present. Mouth/Throat:      Mouth: Mucous membranes are moist.   Eyes:      General:         Right eye: No discharge. Left eye: No discharge. Extraocular Movements: Extraocular movements intact. Cardiovascular:      Rate and Rhythm: Normal rate and regular rhythm. Heart sounds: No murmur heard. No friction rub. No gallop. Pulmonary:      Effort: Pulmonary effort is normal. No respiratory distress. Breath sounds: No wheezing, rhonchi or rales. Abdominal:      General: Bowel sounds are normal.      Palpations: Abdomen is soft. Tenderness: There is no abdominal tenderness. There is no guarding. Musculoskeletal:      Cervical back: Neck supple. Comments: Mild nonpitting edema of bilateral distal shins   Lymphadenopathy:      Cervical:      Right cervical: No superficial cervical adenopathy. Left cervical: No superficial cervical adenopathy. Skin:     General: Skin is dry. Coloration: Skin is not jaundiced. Neurological:      Mental Status: She is alert. Mental status is at baseline. Cranial Nerves: No dysarthria. Motor: Tremor (Resting of bilateral hands, right greater than left) present. No weakness (Muscle strength 5/5 in all 4 extremities). Coordination: Finger-Nose-Finger Test normal.   Psychiatric:         Attention and Perception: Attention normal.         Mood and Affect: Mood and affect normal. Mood is not anxious or depressed. Affect is not tearful. Speech: Speech normal. Speech is not rapid and pressured or slurred. Behavior: Behavior normal. Behavior is not agitated, aggressive or combative. Behavior is cooperative. Thought Content:  Thought content normal.                An electronic signature was used to authenticate this note.     --Marylou Bolden, DO

## 2022-11-23 ENCOUNTER — HOSPITAL ENCOUNTER (OUTPATIENT)
Dept: PHYSICAL THERAPY | Age: 72
Setting detail: RECURRING SERIES
Discharge: HOME | End: 2022-11-26
Payer: MEDICARE

## 2022-11-23 ENCOUNTER — OFFICE VISIT (OUTPATIENT)
Dept: INTERNAL MEDICINE CLINIC | Facility: CLINIC | Age: 72
End: 2022-11-23
Payer: MEDICARE

## 2022-11-23 VITALS
DIASTOLIC BLOOD PRESSURE: 76 MMHG | HEART RATE: 82 BPM | HEIGHT: 68 IN | BODY MASS INDEX: 22.43 KG/M2 | TEMPERATURE: 98 F | OXYGEN SATURATION: 99 % | SYSTOLIC BLOOD PRESSURE: 118 MMHG | WEIGHT: 148 LBS

## 2022-11-23 DIAGNOSIS — F32.A ANXIETY AND DEPRESSION: ICD-10-CM

## 2022-11-23 DIAGNOSIS — H69.83 DYSFUNCTION OF BOTH EUSTACHIAN TUBES: ICD-10-CM

## 2022-11-23 DIAGNOSIS — R00.2 PALPITATIONS: ICD-10-CM

## 2022-11-23 DIAGNOSIS — F41.9 ANXIETY AND DEPRESSION: ICD-10-CM

## 2022-11-23 DIAGNOSIS — G20 PARKINSON'S DISEASE (HCC): ICD-10-CM

## 2022-11-23 DIAGNOSIS — I67.2 INTRACRANIAL ATHEROSCLEROSIS: ICD-10-CM

## 2022-11-23 DIAGNOSIS — M48.02 SPINAL STENOSIS, CERVICAL REGION: Primary | ICD-10-CM

## 2022-11-23 PROCEDURE — G8420 CALC BMI NORM PARAMETERS: HCPCS | Performed by: STUDENT IN AN ORGANIZED HEALTH CARE EDUCATION/TRAINING PROGRAM

## 2022-11-23 PROCEDURE — G8484 FLU IMMUNIZE NO ADMIN: HCPCS | Performed by: STUDENT IN AN ORGANIZED HEALTH CARE EDUCATION/TRAINING PROGRAM

## 2022-11-23 PROCEDURE — 97140 MANUAL THERAPY 1/> REGIONS: CPT

## 2022-11-23 PROCEDURE — 99214 OFFICE O/P EST MOD 30 MIN: CPT | Performed by: STUDENT IN AN ORGANIZED HEALTH CARE EDUCATION/TRAINING PROGRAM

## 2022-11-23 PROCEDURE — 1036F TOBACCO NON-USER: CPT | Performed by: STUDENT IN AN ORGANIZED HEALTH CARE EDUCATION/TRAINING PROGRAM

## 2022-11-23 PROCEDURE — G8427 DOCREV CUR MEDS BY ELIG CLIN: HCPCS | Performed by: STUDENT IN AN ORGANIZED HEALTH CARE EDUCATION/TRAINING PROGRAM

## 2022-11-23 PROCEDURE — 3017F COLORECTAL CA SCREEN DOC REV: CPT | Performed by: STUDENT IN AN ORGANIZED HEALTH CARE EDUCATION/TRAINING PROGRAM

## 2022-11-23 PROCEDURE — 1090F PRES/ABSN URINE INCON ASSESS: CPT | Performed by: STUDENT IN AN ORGANIZED HEALTH CARE EDUCATION/TRAINING PROGRAM

## 2022-11-23 PROCEDURE — 97110 THERAPEUTIC EXERCISES: CPT

## 2022-11-23 PROCEDURE — 1123F ACP DISCUSS/DSCN MKR DOCD: CPT | Performed by: STUDENT IN AN ORGANIZED HEALTH CARE EDUCATION/TRAINING PROGRAM

## 2022-11-23 PROCEDURE — G8399 PT W/DXA RESULTS DOCUMENT: HCPCS | Performed by: STUDENT IN AN ORGANIZED HEALTH CARE EDUCATION/TRAINING PROGRAM

## 2022-11-23 RX ORDER — PRAVASTATIN SODIUM 20 MG
20 TABLET ORAL NIGHTLY
Qty: 90 TABLET | Refills: 1 | Status: SHIPPED | OUTPATIENT
Start: 2022-11-23

## 2022-11-23 ASSESSMENT — ENCOUNTER SYMPTOMS
SHORTNESS OF BREATH: 0
SINUS PRESSURE: 0
SINUS PAIN: 0
WHEEZING: 1

## 2022-11-23 NOTE — PROGRESS NOTES
Maciel Sullivan  : 1950  Primary: Medicare Part A [employer] B  Secondary: [address] @ [address] [address]  Phone: [phone]  Fax: [phone] Plan Frequency: 1-2 visits per week for 8 weeks  Plan of Care/Certification Expiration Date: 22      PT Visit Info:   Visit Count:  15   OUTPATIENT PHYSICAL THERAPY:OP NOTE TYPE: Treatment Note 2022       Episode  }Appt Desk             Treatment Diagnosis:  Cervicalgia (M54.2)  Medical/Referring Diagnosis:  Cervicalgia [M54.2]  Other chronic pain [G89.29]  Referring Physician:  Shayy Hopson MD MD Orders:  PT Eval and Treat   Date of Onset:  Onset Date: 21 (Approximately 1 year ago)     Allergies:   Dexamethasone, Erythromycin, Gluten, and Ciprofloxacin  Restrictions/Precautions:  No data recordedNo data recorded   Interventions Planned (Treatment may consist of any combination of the following):    Current Treatment Recommendations: Strengthening; ROM; Integrated dry needling; Modalities; Patient/Caregiver education & training; Home exercise program; Manual Therapy - Soft Tissue Mobilization; Manual Therapy - Joint Manipulation; Neuromuscular re-education     Subjective Comments:       Initial:     0/10 Post Session:        mild increase in shoulder fatigue  Medications Last Reviewed:  2022  Updated Objective Findings:  None today  Treatment   THERAPEUTIC EXERCISE: (15 minutes) in order to reduce neck pain and tightness. Exercises below to increase cervical AROM and improve posture. Stretches to each upper trapezius to reduce upper quarter tightness (3 x 20\").      Date:  22 Date:  22 Date:     Activity/Exercise Parameters Parameters Parameters   Side lying middle trap Bilateral 1# 2x10 guided     Side lying lower trap  Bilateral 2x10 guided     Side lying ER  Bilateral 2x10 ,1# guided     Supine chin tucks x15 X 20    Seated scapula retraction for posture 2x10 X 20                    MANUAL THERAPY (20 minutes) to reduce bilateral upper trapezius discomfort and increase cervical AROM. Gentle manual traction and gentle cervical PROM to reduce discomfort in supine. HEP: No changes today    Treatment/Session Summary:    Treatment Assessment: Patient reported feeling better after therapy. Remains tight and tender to cervical paraspinals and upper trapezius muscles. Communication/Consultation:  None today  Equipment provided today:  None  Recommendations/Intent for next treatment session: Next visit will focus on reducing cervical pain and tightness. Will reduce frequency to one visit per week due to progress.     Total Treatment Billable Duration:  35 minutes  Time In: 4129  Time Out: [address] HANSEN, STEVE       Charge Capture  }Post Session Pain  PT Visit Info  [employer] Portal  MD Guidelines  Scanned Media  Benefits  MyChart    Future Appointments   Date Time Provider Sarai Pozo   11/30/2022  9:45 AM Tamela Harada, PT [facility]   2/23/2023 10:00 AM DO PATRICIO Mcclain [facility]   3/17/2023  9:30 AM David Fierro MD [facility] ([facility]) Neurology -

## 2022-11-30 ENCOUNTER — HOSPITAL ENCOUNTER (OUTPATIENT)
Dept: PHYSICAL THERAPY | Age: 72
Setting detail: RECURRING SERIES
Discharge: HOME | End: 2022-12-03
Payer: MEDICARE

## 2022-11-30 PROCEDURE — 97140 MANUAL THERAPY 1/> REGIONS: CPT

## 2022-11-30 PROCEDURE — 97110 THERAPEUTIC EXERCISES: CPT

## 2022-12-10 NOTE — PROGRESS NOTES
Alma Rosa Jimenez  : 1950  Primary: Medicare Part A [employer] B  Secondary: [address] @ [address] [address]  Phone: [phone]  Fax: [phone]           PT Visit Info:         Visit Count:  9    OUTPATIENT PHYSICAL THERAPY:OP NOTE TYPE: Discontinuation Summary 10/25/2022               Episode  Appt Desk         Treatment Diagnosis:  Cervicalgia (M54.2)  Medical/Referring Diagnosis:  Cervicalgia [M54.2]  Other chronic pain [G89.29]  Referring Physician:  Jamel Rodriguez MD MD Orders:  PT Eval and Treat   Return MD Appt:  TBD  Date of Onset:  Onset Date: 21 (Approximately 1 year ago)     Allergies:  Dexamethasone, Erythromycin, Gluten, and Ciprofloxacin  Restrictions/Precautions:         Medications Last Reviewed:  10/25/2022            Discontinuation Summary:  Patient attended 9 PT appointments to address neck pain. Her therapy episode will be concluded at this time as her plan of care has  without additional appointments scheduled. Goals: (Goals have been discussed and agreed upon with patient.)    Discharge Goals:   Patient will report cessation of episodes of neck \"locking down\" with sneezing or anxiety. Ongoing, progressing. Frequency has decreased but still has them. 10/25/22  Patient will be independent with HEP. MET 10/25/22  Patient will improve score on NDI to <10% limited. Patient will have 125 degrees of active shoulder forward elevation bilaterally. Ongoing 10/25/22  Patient will have symmetrical cervical rotation range of motion without increased discomfort. Ongoing, 10/25/22         Outcome Measure: Tool Used: Neck Disability Index (NDI)  Score:  Initial: 10/50 or 20% limited Most Recent:  (Date: 10/26/22 )   Interpretation of Score:  The Neck Disability Index is a revised form of the Oswestry Low Back Pain Index and is designed to measure the activities of daily living in person's with neck pain. Each section is scored on a 0-5 scale, 5 representing the greatest disability. The scores of each section are added together for a total score of 50.          Charlie Hooker, PT         Post Session Pain  Charge Capture  PT Visit Info MD Guidelines  [place]

## 2023-02-18 DIAGNOSIS — I67.2 INTRACRANIAL ATHEROSCLEROSIS: ICD-10-CM

## 2023-02-19 NOTE — PROGRESS NOTES
Alferd Alpers (:  1950) is a 72 y.o. female,Established patient, here for evaluation of the following chief complaint(s):  Follow-up (3 month Follow Up /Having fatigue and light-headedness. Has been going on for a while. Thinks that it is related to the Parkinson's )         ASSESSMENT/PLAN:  1. Parkinson's disease ([facility] [address]  Syn-One test pathology from  of this year with pathologic evidence of phosphorylated alpha-synuclein deposition within cutaneous nerves; decreased intraepidermal nerve fiber density consistent with a small fiber neuropathy  MRI of the brain without contrast on 3/16/2020 with teardrop shaped neural cyst or prominent perivascular space is demonstrated in the left centrum semiovale  Follow-up per neurology. Patient declining initiation of medication at this time    2. Palpitations  Prior EKG showed NSR, normal axis, no acute ST segment or T wave abnormalities  Electrolytes including magnesium and potassium within normal limits in 2022  EKG in office today showed NSR, normal axis, PVC  Continue metoprolol succinate (advised patient she can move it back to evening time as it should not interact with her cholesterol medication), follow-up with cardiology    - T4, Free; Future  - TSH; Future    3. Anxiety and depression  Currently on as needed alprazolam  Previously referred to psychiatry for cognitive behavioral therapy but patient states she never heard from specialist office  Offered new referral to a therapist but patient plans on following back up with her previous therapist.  Encouraged her to reach out  Does have occasional thoughts of self-harm but no intention or plan.   Verbal contract made that if worsening anxiety and depression or suicidal/homicidal intention or plan that she will contact provider on call or go to the emergency room  Offered maintenance medication but states she has been on antidepressants before and not had good results and declines at this time    - T4, Free; Future  - TSH; Future    4. Intracranial atherosclerosis  CTA of the head and neck on 10/6/2022 with beadlike appearance of cervical left vertebral artery at the level of C1-2 (mild to moderate stenoses appear to be present in this area); small focus of encephalomalacia in the posterior left frontal lobe, unchanged from prior  Echocardiogram on 3/16/2020 with a EF of 55 to 60%, no evidence of right to left shunt with provocative testing  Continue low-dose daily aspirin.  Prior intolerance to rosuvastatin  Continue pravastatin    - CBC with Auto Differential; Future  - Comprehensive Metabolic Panel; Future  - Lipid Panel; Future    5. Allergic rhinitis, unspecified seasonality, unspecified trigger  Continue over-the-counter antihistamine and Flonase    6. Encounter for screening mammogram for breast cancer  Screening mammogram ordered    - [facility] DIGITAL SCREEN W OR WO CAD BILATERAL; Future    7. Chronic fatigue  Suspect multifactorial in setting of Parkinson's disease, underlying anxiety and depression, orthostatic drops in blood pressure  Check TFTs to evaluate for hypo or hyperthyroidism, B12 and vitamin D levels given history of deficiency    - T4, Free; Future  - TSH; Future  - Vitamin B12; Future  - Vitamin D 25 Hydroxy; Future    8. Lightheadedness  EKG in office today showed NSR, normal axis, no acute ST segment or T wave abnormalities  Suspect secondary to orthostatic drops in blood pressure exacerbated from Parkinson's disease  Orthostatics in office today:   - 128/80 pulse 106 laying, 118/78 pulse 78 sitting, 116/76 pulse 84 standing  Counseled on orthostatic precautions, maintain adequate hydration  Based on severity and frequency in the future may need to consider medication such as midodrine or fludrocortisone    - EKG 12 Lead    9. Vitamin D deficiency  Recheck vitamin D level and adjust supplementation accordingly given history of deficiency and chronic fatigue    - Vitamin D 25  Hydroxy; Future    10. Vitamin B12 deficiency  Check B12 level and adjust supplementation accordingly given history of deficiency and chronic fatigue    - Vitamin B12; Future    No follow-ups on file. Subjective   SUBJECTIVE/OBJECTIVE:  Patient is a 26-year-old  female who presents to the office today for follow-up. Patient still endorses chronic fatigue ongoing for the past year. Describes it specifically as muscle fatigue stating it now takes her 40 minutes to make the bed. Feels like her fatigue is worsened since she moved her metoprolol to the morning as opposed to nighttime. Did this because she was concerned that metoprolol and pravastatin would interact. Denies increasing palpitations but does note occasional episodes of a catching sensation in her chest.  Has variable quality of sleep sometimes awakening a few times a night between 2 and 3 times specifically to urinate. However does stop her fluid intake around 6 PM.  Does have urinary frequency during the day but attributes this to drinking a lot of water. Denies dysuria, hematuria, fevers or chills. Does have chronic postnasal drip and cough in the morning. Still struggles with anxiety taking her alprazolam 3 days a week which still seems to help. No adverse effects of the medication. Eats 2 meals a day but does get some bloating after she eats. Reports colonoscopy in [facility] approximately 10 years ago which was normal.  Also had an EGD at that time which she thinks showed some irritation. No nausea, vomiting, diarrhea, constipation, melena or hematochezia. Patient does have bouts of lightheadedness feeling as if she may pass out primarily with position changes. Patient states over a year ago she did have a period where she lost around 30 pounds but attributed this to doing things to help out her daughter and granddaughter. Has gained back some of that weight since then.       Review of Systems   Constitutional:  Negative for chills and fever. HENT:  Positive for postnasal drip. Respiratory:  Positive for cough. Gastrointestinal:  Negative for anal bleeding, blood in stool, constipation, diarrhea, nausea and vomiting. Positive for sensation of bloating after meals   Genitourinary:  Positive for frequency (Chronic). Negative for dysuria and hematuria. Positive for nocturia   Psychiatric/Behavioral:  Positive for sleep disturbance. The patient is nervous/anxious. Objective   Physical Exam  Vitals reviewed. Constitutional:       General: She is not in acute distress. Appearance: She is not ill-appearing, toxic-appearing or diaphoretic. Interventions: She is not intubated. Comments: Pleasant, elderly, frail  female in no acute cardiopulmonary distress   HENT:      Head: Normocephalic and atraumatic. Right Ear: Ear canal and external ear normal. There is no impacted cerumen. Left Ear: Tympanic membrane, ear canal and external ear normal. There is no impacted cerumen. Ears:      Comments: Minor effusion behind right tympanic membrane but no erythema or perforation  Eyes:      General:         Right eye: No discharge. Left eye: No discharge. Extraocular Movements: Extraocular movements intact. Neck:      Vascular: No carotid bruit. Cardiovascular:      Rate and Rhythm: Normal rate and regular rhythm. Heart sounds: No murmur heard. No friction rub. No gallop. Pulmonary:      Effort: Pulmonary effort is normal. No tachypnea, accessory muscle usage, respiratory distress or retractions. She is not intubated. Breath sounds: No wheezing, rhonchi or rales. Abdominal:      General: Bowel sounds are normal.      Palpations: Abdomen is soft. Tenderness: There is no abdominal tenderness. There is no guarding. Musculoskeletal:      Right lower leg: Edema present. Left lower leg: Edema present.       Comments: Mild nonpitting edema of bilateral ankles with some tenderness to palpation   Skin:     Coloration: Skin is not jaundiced. Neurological:      Mental Status: She is alert. Mental status is at baseline. Motor: Tremor (Resting tremor of hands right greater than left) present. Psychiatric:         Attention and Perception: Attention normal.         Mood and Affect: Affect is blunt. Affect is not tearful. Speech: Speech normal. Speech is not rapid and pressured or slurred. Behavior: Behavior normal. Behavior is not agitated, aggressive or combative. Behavior is cooperative. Thought Content: Thought content normal.          On this date 2/23/2023 I have spent 52 minutes reviewing previous notes, test results and face to face with the patient discussing the diagnosis and importance of compliance with the treatment plan as well as documenting on the day of the visit. An electronic signature was used to authenticate this note.     --Julio Moran, DO

## 2023-02-20 RX ORDER — PRAVASTATIN SODIUM 20 MG
20 TABLET ORAL NIGHTLY
Qty: 90 TABLET | Refills: 1 | OUTPATIENT
Start: 2023-02-20

## 2023-02-23 ENCOUNTER — OFFICE VISIT (OUTPATIENT)
Dept: INTERNAL MEDICINE CLINIC | Facility: CLINIC | Age: 73
End: 2023-02-23
Payer: MEDICARE

## 2023-02-23 VITALS
SYSTOLIC BLOOD PRESSURE: 116 MMHG | WEIGHT: 144 LBS | HEART RATE: 84 BPM | OXYGEN SATURATION: 98 % | DIASTOLIC BLOOD PRESSURE: 76 MMHG | BODY MASS INDEX: 21.82 KG/M2 | HEIGHT: 68 IN | RESPIRATION RATE: 16 BRPM

## 2023-02-23 DIAGNOSIS — F41.9 ANXIETY AND DEPRESSION: ICD-10-CM

## 2023-02-23 DIAGNOSIS — E53.8 VITAMIN B12 DEFICIENCY: ICD-10-CM

## 2023-02-23 DIAGNOSIS — R42 LIGHTHEADEDNESS: ICD-10-CM

## 2023-02-23 DIAGNOSIS — Z12.31 ENCOUNTER FOR SCREENING MAMMOGRAM FOR BREAST CANCER: ICD-10-CM

## 2023-02-23 DIAGNOSIS — G20 PARKINSON'S DISEASE (HCC): Primary | ICD-10-CM

## 2023-02-23 DIAGNOSIS — R00.2 PALPITATIONS: ICD-10-CM

## 2023-02-23 DIAGNOSIS — R53.82 CHRONIC FATIGUE: ICD-10-CM

## 2023-02-23 DIAGNOSIS — F32.A ANXIETY AND DEPRESSION: ICD-10-CM

## 2023-02-23 DIAGNOSIS — J30.9 ALLERGIC RHINITIS, UNSPECIFIED SEASONALITY, UNSPECIFIED TRIGGER: ICD-10-CM

## 2023-02-23 DIAGNOSIS — Z23 NEED FOR PNEUMOCOCCAL VACCINATION: ICD-10-CM

## 2023-02-23 DIAGNOSIS — E55.9 VITAMIN D DEFICIENCY: ICD-10-CM

## 2023-02-23 DIAGNOSIS — I67.2 INTRACRANIAL ATHEROSCLEROSIS: ICD-10-CM

## 2023-02-23 PROCEDURE — G8427 DOCREV CUR MEDS BY ELIG CLIN: HCPCS | Performed by: STUDENT IN AN ORGANIZED HEALTH CARE EDUCATION/TRAINING PROGRAM

## 2023-02-23 PROCEDURE — G0009 ADMIN PNEUMOCOCCAL VACCINE: HCPCS | Performed by: STUDENT IN AN ORGANIZED HEALTH CARE EDUCATION/TRAINING PROGRAM

## 2023-02-23 PROCEDURE — 99215 OFFICE O/P EST HI 40 MIN: CPT | Performed by: STUDENT IN AN ORGANIZED HEALTH CARE EDUCATION/TRAINING PROGRAM

## 2023-02-23 PROCEDURE — 1090F PRES/ABSN URINE INCON ASSESS: CPT | Performed by: STUDENT IN AN ORGANIZED HEALTH CARE EDUCATION/TRAINING PROGRAM

## 2023-02-23 PROCEDURE — 1123F ACP DISCUSS/DSCN MKR DOCD: CPT | Performed by: STUDENT IN AN ORGANIZED HEALTH CARE EDUCATION/TRAINING PROGRAM

## 2023-02-23 PROCEDURE — G8420 CALC BMI NORM PARAMETERS: HCPCS | Performed by: STUDENT IN AN ORGANIZED HEALTH CARE EDUCATION/TRAINING PROGRAM

## 2023-02-23 PROCEDURE — 1036F TOBACCO NON-USER: CPT | Performed by: STUDENT IN AN ORGANIZED HEALTH CARE EDUCATION/TRAINING PROGRAM

## 2023-02-23 PROCEDURE — G8399 PT W/DXA RESULTS DOCUMENT: HCPCS | Performed by: STUDENT IN AN ORGANIZED HEALTH CARE EDUCATION/TRAINING PROGRAM

## 2023-02-23 PROCEDURE — 93000 ELECTROCARDIOGRAM COMPLETE: CPT | Performed by: STUDENT IN AN ORGANIZED HEALTH CARE EDUCATION/TRAINING PROGRAM

## 2023-02-23 PROCEDURE — G8484 FLU IMMUNIZE NO ADMIN: HCPCS | Performed by: STUDENT IN AN ORGANIZED HEALTH CARE EDUCATION/TRAINING PROGRAM

## 2023-02-23 PROCEDURE — 3017F COLORECTAL CA SCREEN DOC REV: CPT | Performed by: STUDENT IN AN ORGANIZED HEALTH CARE EDUCATION/TRAINING PROGRAM

## 2023-02-23 PROCEDURE — 90677 PCV20 VACCINE IM: CPT | Performed by: STUDENT IN AN ORGANIZED HEALTH CARE EDUCATION/TRAINING PROGRAM

## 2023-02-23 SDOH — ECONOMIC STABILITY: INCOME INSECURITY: HOW HARD IS IT FOR YOU TO PAY FOR THE VERY BASICS LIKE FOOD, HOUSING, MEDICAL CARE, AND HEATING?: NOT HARD AT ALL

## 2023-02-23 SDOH — ECONOMIC STABILITY: FOOD INSECURITY: WITHIN THE PAST 12 MONTHS, YOU WORRIED THAT YOUR FOOD WOULD RUN OUT BEFORE YOU GOT MONEY TO BUY MORE.: NEVER TRUE

## 2023-02-23 SDOH — ECONOMIC STABILITY: HOUSING INSECURITY
IN THE LAST 12 MONTHS, WAS THERE A TIME WHEN YOU DID NOT HAVE A STEADY PLACE TO SLEEP OR SLEPT IN A SHELTER (INCLUDING NOW)?: NO

## 2023-02-23 SDOH — ECONOMIC STABILITY: FOOD INSECURITY: WITHIN THE PAST 12 MONTHS, THE FOOD YOU BOUGHT JUST DIDN'T LAST AND YOU DIDN'T HAVE MONEY TO GET MORE.: NEVER TRUE

## 2023-02-23 ASSESSMENT — PATIENT HEALTH QUESTIONNAIRE - PHQ9
10. IF YOU CHECKED OFF ANY PROBLEMS, HOW DIFFICULT HAVE THESE PROBLEMS MADE IT FOR YOU TO DO YOUR WORK, TAKE CARE OF THINGS AT HOME, OR GET ALONG WITH OTHER PEOPLE: 2
5. POOR APPETITE OR OVEREATING: 1
SUM OF ALL RESPONSES TO PHQ QUESTIONS 1-9: 11
SUM OF ALL RESPONSES TO PHQ9 QUESTIONS 1 & 2: 5
8. MOVING OR SPEAKING SO SLOWLY THAT OTHER PEOPLE COULD HAVE NOTICED. OR THE OPPOSITE, BEING SO FIGETY OR RESTLESS THAT YOU HAVE BEEN MOVING AROUND A LOT MORE THAN USUAL: 0
1. LITTLE INTEREST OR PLEASURE IN DOING THINGS: 2
4. FEELING TIRED OR HAVING LITTLE ENERGY: 3
SUM OF ALL RESPONSES TO PHQ QUESTIONS 1-9: 12
9. THOUGHTS THAT YOU WOULD BE BETTER OFF DEAD, OR OF HURTING YOURSELF: 1
2. FEELING DOWN, DEPRESSED OR HOPELESS: 3
7. TROUBLE CONCENTRATING ON THINGS, SUCH AS READING THE NEWSPAPER OR WATCHING TELEVISION: 0
SUM OF ALL RESPONSES TO PHQ QUESTIONS 1-9: 12
SUM OF ALL RESPONSES TO PHQ QUESTIONS 1-9: 12
6. FEELING BAD ABOUT YOURSELF - OR THAT YOU ARE A FAILURE OR HAVE LET YOURSELF OR YOUR FAMILY DOWN: 1
3. TROUBLE FALLING OR STAYING ASLEEP: 1

## 2023-02-23 ASSESSMENT — ENCOUNTER SYMPTOMS
BLOOD IN STOOL: 0
DIARRHEA: 0
NAUSEA: 0
COUGH: 1
CONSTIPATION: 0
VOMITING: 0
ANAL BLEEDING: 0

## 2023-02-23 ASSESSMENT — COLUMBIA-SUICIDE SEVERITY RATING SCALE - C-SSRS
2. HAVE YOU ACTUALLY HAD ANY THOUGHTS OF KILLING YOURSELF?: NO
1. WITHIN THE PAST MONTH, HAVE YOU WISHED YOU WERE DEAD OR WISHED YOU COULD GO TO SLEEP AND NOT WAKE UP?: NO
6. HAVE YOU EVER DONE ANYTHING, STARTED TO DO ANYTHING, OR PREPARED TO DO ANYTHING TO END YOUR LIFE?: NO

## 2023-03-16 ASSESSMENT — ENCOUNTER SYMPTOMS
CONSTIPATION: 0
COUGH: 0

## 2023-03-17 ENCOUNTER — OFFICE VISIT (OUTPATIENT)
Dept: NEUROLOGY | Age: 73
End: 2023-03-17
Payer: MEDICARE

## 2023-03-17 VITALS
HEIGHT: 68 IN | SYSTOLIC BLOOD PRESSURE: 102 MMHG | BODY MASS INDEX: 22.13 KG/M2 | DIASTOLIC BLOOD PRESSURE: 68 MMHG | OXYGEN SATURATION: 97 % | HEART RATE: 76 BPM | WEIGHT: 146 LBS

## 2023-03-17 DIAGNOSIS — Z86.73 HISTORY OF TIA (TRANSIENT ISCHEMIC ATTACK): ICD-10-CM

## 2023-03-17 DIAGNOSIS — G20 PARKINSON'S DISEASE (HCC): Primary | ICD-10-CM

## 2023-03-17 DIAGNOSIS — G47.19 EXCESSIVE DAYTIME SLEEPINESS: ICD-10-CM

## 2023-03-17 DIAGNOSIS — G25.2 RESTING TREMOR: ICD-10-CM

## 2023-03-17 DIAGNOSIS — R53.82 CHRONIC FATIGUE: ICD-10-CM

## 2023-03-17 PROCEDURE — G8420 CALC BMI NORM PARAMETERS: HCPCS | Performed by: PSYCHIATRY & NEUROLOGY

## 2023-03-17 PROCEDURE — 1123F ACP DISCUSS/DSCN MKR DOCD: CPT | Performed by: PSYCHIATRY & NEUROLOGY

## 2023-03-17 PROCEDURE — 3017F COLORECTAL CA SCREEN DOC REV: CPT | Performed by: PSYCHIATRY & NEUROLOGY

## 2023-03-17 PROCEDURE — G8427 DOCREV CUR MEDS BY ELIG CLIN: HCPCS | Performed by: PSYCHIATRY & NEUROLOGY

## 2023-03-17 PROCEDURE — 99214 OFFICE O/P EST MOD 30 MIN: CPT | Performed by: PSYCHIATRY & NEUROLOGY

## 2023-03-17 PROCEDURE — G8399 PT W/DXA RESULTS DOCUMENT: HCPCS | Performed by: PSYCHIATRY & NEUROLOGY

## 2023-03-17 PROCEDURE — 1036F TOBACCO NON-USER: CPT | Performed by: PSYCHIATRY & NEUROLOGY

## 2023-03-17 PROCEDURE — 1090F PRES/ABSN URINE INCON ASSESS: CPT | Performed by: PSYCHIATRY & NEUROLOGY

## 2023-03-17 PROCEDURE — G8484 FLU IMMUNIZE NO ADMIN: HCPCS | Performed by: PSYCHIATRY & NEUROLOGY

## 2023-03-17 NOTE — PROGRESS NOTES
Yuliana Pennington  [address], [address] [facility], [address] [place] [address]  Phone: [phone] Fax [phone]  Tomasa Martinez MD      Patient: Sincere Dodw  Provider: Tomasa Martinez MD    CC:   Chief Complaint   Patient presents with    Follow-up     Parkinson's disease      Referring Provider:    History of Present Illness:     Sincere Dowd is a 72 y.o. RH female who presents for follow-up of tremor and probable Parkinson's disease. She is unaccompanied for today's visit. She was last seen in September 2022. Patient presents for follow-up and continued management of a resting tremor of the right upper extremity, most likely felt to be idiopathic Parkinson's disease. She had a skin biopsy (Syn-One) which showed evidence of alpha-synuclein deposition within the cutaneous nerves which supports the diagnosis. Current medications include (but not limited to): N/A    Previous medication trials include: N/A    Patient presents today for follow-up. She remains on no dopaminergic medications. She has a fairly persistent tremor of the right hand. The left side continues to be unaffected. Additionally there have been symptoms suggestive of a frozen shoulder on the right for which she has undergone physical therapy and other lidocaine injections with temporary benefit. She has a previous history of a possible TIA in March 2020 for which she now continues on aspirin 81 mg daily. There has been no other speech or swallowing disturbances. She does endorse a loss of smell over time. No bowel or bladder disturbances. No known behaviors at night resembling RBD. No constipation. She does have some difficulties with chronic fatigue. Review of Systems:   Review of Systems   Constitutional:  Negative for fever. HENT:  Negative for hearing loss. Eyes:  Negative for visual disturbance. Respiratory:  Negative for cough. Cardiovascular:  Negative for chest pain.    Gastrointestinal: Negative for constipation. Genitourinary:  Negative for dysuria. Musculoskeletal:  Positive for arthralgias and neck pain. Skin:  Negative for rash. Allergic/Immunologic: Negative for immunocompromised state. Neurological:  Positive for tremors. Psychiatric/Behavioral:  The patient is nervous/anxious. Lab/Imaging Review:   I REVIEWED PERTINENT LABS, IMAGES, AND REPORTS WITH THE PATIENT PERSONALLY, DIRECTLY AND FULLY. 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 There is reduced intraepidermal nerve fiber density consistent with a small fiber neuropathy. There is no pathologic evidence of amyloid deposition in cutaneous nerves. MRI Cervical Spine August 2021:  Findings: The cervical vertebral bodies are normal in height and signal. There is multilevel degenerative disc signal with disc space narrowing noted at C4-C5, C5-C6 and C6-C7. The cervical spinal cord is normal in signal throughout. The craniocervical junction and imaged posterior fossa are unremarkable. There is no prevertebral or posterior soft tissue edema. C2-C3: No central stenosis or foramina narrowing. C3-C4: There is no central stenosis or foramina narrowing. C4-C5: There is a shallow broad-based disc osteophyte complex without central stenosis or foramina narrowing. C5-C6: There is bulky broad-based disc osteophyte complex present. This results in mild central spinal stenosis. The foramina are patent. C6-C7: There is a broad-based slightly eccentric to the left disc osteophyte complex. Mild spinal stenosis is present. Foramina are patent. C7-T1: No central spinal stenosis or foramina narrowing. Limited evaluation the paraspinal soft tissues is unremarkable. IMPRESSION  1.  Multilevel degenerative disc disease most evident at C5-C6 resulting in mild central spinal stenosis. There is mild central spinal stenosis also evident at C6-C7. 2. No acute musculoskeletal abnormality or abnormal spinal cord signal evident. MRI Brain March 2020: Impression:    1. No acute intracranial abnormality. 2. Neural cyst or prominent perivascular space in the left centrum semiovale. MRA Brain March 2020:  IMPRESSION:  Grossly unremarkable study. MRA Neck March 2020:  IMPRESSION:  No significant stenosis       Past Medical History:     Past medical history, surgical history, social history, family history, medications, and allergies were reviewed and updated as appropriate. PAST MEDICAL HISTORY:  Past Medical History:   Diagnosis Date    Anxiety     GERD (gastroesophageal reflux disease)     Hepatitis C     s/p 2 years interferon    Ill-defined condition     Palpitations     Parkinson disease ([address]      PAST SURGICAL HISTORY:   Past Surgical History:   Procedure Laterality Date    CHOLECYSTECTOMY      HYSTERECTOMY (CERVIX STATUS UNKNOWN)      LUMBAR LAMINECTOMY      schwannoma     FAMILY HISTORY:  Family History   Problem Relation Age of Onset    Hypertension Mother     Elevated Lipids Mother     Tremors Mother         essential    Hypertension Father     Heart Failure Father         CAD    Elevated Lipids Father     Elevated Lipids Sister     Hypertension Sister     Elevated Lipids Brother     Tremors Brother         essential    Lung Disease Maternal Grandfather         black lung,     Bipolar Disorder Daughter     Other Daughter         PUD    Breast Cancer Neg Hx       SOCIAL HISTORY:  Social History     Socioeconomic History    Marital status:      Spouse name: None    Number of children: None    Years of education: None    Highest education level: None   Tobacco Use    Smoking status: Former     Types: Cigarettes    Smokeless tobacco: Never    Tobacco comments:      In high school    Vaping Use    Vaping Use: Never used   Substance and Sexual Activity Alcohol use: Not Currently    Drug use: Not Currently     Social Determinants of Health     Financial Resource Strain: Low Risk     Difficulty of Paying Living Expenses: Not hard at all   Food Insecurity: No Food Insecurity    Worried About [address] in the Last Year: Never true    [address] [facility] [address] in the Last Year: Never true   Transportation Needs: Unknown    Lack of Transportation (Non-Medical): No   Housing Stability: Unknown    Unstable Housing in the Last Year: No       Medications/Allergies:     MEDICATIONS:   Outpatient Encounter Medications as of 3/17/2023   Medication Sig Dispense Refill    ALPRAZolam (XANAX PO) Take by mouth      carbidopa-levodopa (SINEMET)  MG per tablet Take 1 tablet by mouth 2 times daily 60 tablet 1    pravastatin (PRAVACHOL) 20 MG tablet Take 1 tablet by mouth at bedtime 90 tablet 1    metoprolol succinate (TOPROL XL) 25 MG extended release tablet Take 1.5 tablets by mouth daily 135 tablet 1    aspirin 81 MG chewable tablet Take 81 mg by mouth daily      calcium carbonate (OYSTER SHELL CALCIUM 500 MG) 1250 (500 Ca) MG tablet Take by mouth daily      Cholecalciferol 50 MCG (2000 UT) TABS Take by mouth      cyanocobalamin 100 MCG tablet Take 500 mcg by mouth daily      fexofenadine (ALLEGRA) 180 MG tablet Take by mouth      rosuvastatin (CRESTOR) 5 MG tablet Take 1 tablet by mouth daily (Patient not taking: No sig reported) 90 tablet 2     No facility-administered encounter medications on file as of 3/17/2023. ALLERGIES:  Allergies   Allergen Reactions    Dexamethasone Other (See Comments)     Steroid induced psychosis     Erythromycin Other (See Comments)     Abdominal pain     Gluten Other (See Comments)     Bloating     Ciprofloxacin Rash       Physical Exam:     /68   Pulse 76   Ht 5' 8\" (1.727 m)   Wt 146 lb (66.2 kg)   SpO2 97%   BMI 22.20 kg/m²     General Exam:  General: Well developed, well nourished, in no apparent distress.   She is quite anxious.  HEENT: Normocephalic, atraumatic. Sclera anicteric. Oropharynx clear.   Neck: There is some subjective stiffness and limited range of motion particularly with head turning to the left.  Cardiovascular: Regular rate and rhythm. No carotid bruits.   Lungs: Non-labored breathing.   Abdomen: Soft, nontender, nondistended.   Extremities: Peripheral pulses intact. No edema and no rashes.      Neurological Exam:      MS/Language/Speech:  Alert. Oriented x 3. Language fluent. Speech normal.      Cranial Nerves: PERRL. Eye movements full. No nystagmus. Face was symmetric with good activation and normal sensation. Tongue and palate were midline. Shoulder shrug symmetric.     Motor: Strength was full in all proximal and distal muscle groups.  There is a mild increase in tone of the right upper extremity with some cogwheeling noted which enhances with contralateral activation.       Abnormal Movements: There is an intermittent resting tremor of the right upper extremity which augments with distraction. The tremor tends to attenuate with kinesis and is not noticeable with finger-nose-finger. There is no tremor on the left.  Rapid alternating movements show mild bradykinesia.  Her handwriting is very legible and spiral drawing showed no discernible evidence of tremor.     Sensory: Normal to light touch throughout.     Cerebellar: No ataxia or dysmetria with finger-nose-finger bilaterally.      Reflexes (R/L): Biceps (3+/3+), Brachioradialis (3+/3+), Patellar (3+/3+).      Gait: She is able to rise from a seated position without difficulty.  Her posture is upright.  Romberg was normal. She walks with a relatively fluid gait with adequate stride length though there is really diminished arm swing on the right with tremor noted.  There is no freezing or hesitation.      Assessment and Plan:     Jessenia Prakash is a 73 y.o. female who presents with the following issues:     Jessenia was seen today for  follow-up. Diagnoses and all orders for this visit:    Parkinson's disease ([address]  -     carbidopa-levodopa (SINEMET)  MG per tablet; Take 1 tablet by mouth 2 times daily    Resting tremor    History of TIA (transient ischemic attack)    Excessive daytime sleepiness  -     [facility]    Chronic fatigue  -     [facility]      Patient presents for follow-up of a unilateral resting tremor with other evidence of parkinsonism clinically suggestive of idiopathic Parkinson's disease. We have discussed the likely diagnosis of idiopathic PD at previous visits and again reaffirmed the diagnosis today. The results of her skin biopsy are supportive of the diagnosis. We again discussed medication options such as dopaminergic therapy. She started to become more interested due to worsening tremor and dexterity of the right hand. Start Sinemet  mg 1 tablet twice a day with a gradual titration up to a goal dose of 1 tablet twice a day. Counseled on potential adverse effects. We have discussed the benefits of aerobic exercise with respect to maintaining functional mobility and delaying disease progression. She will continue follow-up with her orthopedic surgeons with respect to her shoulder. MRI of the cervical spine has been reviewed with only evidence of mild degenerative changes but nothing that looks amenable to surgical intervention at this time. She continues to have some chronic fatigue and excessive daytime sleepiness. She does report snoring at night. We discussed the utility of a sleep study for further evaluation. Follow up in 4 months. Signature: Jolanta Ceja MD      Date:  3/19/2023    [facility]   [address] [place] [address]  Ph: [phone]  Fax: [phone]         I have personally interviewed and examined Mrs. Maciel Sullivan and I have personally reviewed all relevant records including labs and imaging as noted above. I have written all aspects of this note. More than 50% of this time was used for counseling regarding my diagnosis, prognosis, and plans for management. Total visit time: 34 minutes.

## 2023-03-17 NOTE — PATIENT INSTRUCTIONS
Start carbidopa/levodopa  mg tablets.    Week 1: Take 1/2 tablet in the morning  Week 2: Take 1/2 tablet in the morning, 1/2 tablet in the afternoon  Week 3: Take 1 tablet in the morning, 1 tablet in the afternoon

## 2023-03-27 ENCOUNTER — HOSPITAL ENCOUNTER (OUTPATIENT)
Dept: SLEEP MEDICINE | Age: 73
Discharge: HOME | End: 2023-03-30
Payer: MEDICARE

## 2023-03-27 PROCEDURE — 95810 POLYSOM 6/> YRS 4/> PARAM: CPT

## 2023-03-30 DIAGNOSIS — R00.2 PALPITATIONS: ICD-10-CM

## 2023-03-30 RX ORDER — METOPROLOL SUCCINATE 25 MG/1
TABLET, EXTENDED RELEASE ORAL
Qty: 135 TABLET | Refills: 1 | OUTPATIENT
Start: 2023-03-30

## 2023-04-06 ENCOUNTER — TELEPHONE (OUTPATIENT)
Dept: SLEEP MEDICINE | Age: 73
End: 2023-04-06

## 2023-04-24 ENCOUNTER — HOSPITAL ENCOUNTER (OUTPATIENT)
Dept: LAB | Age: 73
Discharge: HOME | End: 2023-04-27

## 2023-04-24 DIAGNOSIS — E55.9 VITAMIN D DEFICIENCY: ICD-10-CM

## 2023-04-24 DIAGNOSIS — R00.2 PALPITATIONS: ICD-10-CM

## 2023-04-24 DIAGNOSIS — F32.A ANXIETY AND DEPRESSION: ICD-10-CM

## 2023-04-24 DIAGNOSIS — I67.2 INTRACRANIAL ATHEROSCLEROSIS: ICD-10-CM

## 2023-04-24 DIAGNOSIS — F41.9 ANXIETY AND DEPRESSION: ICD-10-CM

## 2023-04-24 DIAGNOSIS — E53.8 VITAMIN B12 DEFICIENCY: ICD-10-CM

## 2023-04-24 DIAGNOSIS — R53.82 CHRONIC FATIGUE: ICD-10-CM

## 2023-04-24 LAB
BASOPHILS # BLD: 0.1 K/UL (ref 0–0.2)
BASOPHILS NFR BLD: 1 % (ref 0–2)
DIFFERENTIAL METHOD BLD: NORMAL
EOSINOPHIL # BLD: 0.1 K/UL (ref 0–0.8)
EOSINOPHIL NFR BLD: 2 % (ref 0.5–7.8)
ERYTHROCYTE [DISTWIDTH] IN BLOOD BY AUTOMATED COUNT: 12.7 % (ref 11.9–14.6)
HCT VFR BLD AUTO: 42 % (ref 35.8–46.3)
HGB BLD-MCNC: 13.8 G/DL (ref 11.7–15.4)
IMM GRANULOCYTES # BLD AUTO: 0 K/UL (ref 0–0.5)
IMM GRANULOCYTES NFR BLD AUTO: 0 % (ref 0–5)
LYMPHOCYTES # BLD: 1.5 K/UL (ref 0.5–4.6)
LYMPHOCYTES NFR BLD: 28 % (ref 13–44)
MCH RBC QN AUTO: 29.5 PG (ref 26.1–32.9)
MCHC RBC AUTO-ENTMCNC: 32.9 G/DL (ref 31.4–35)
MCV RBC AUTO: 89.7 FL (ref 82–102)
MONOCYTES # BLD: 0.4 K/UL (ref 0.1–1.3)
MONOCYTES NFR BLD: 7 % (ref 4–12)
NEUTS SEG # BLD: 3.2 K/UL (ref 1.7–8.2)
NEUTS SEG NFR BLD: 62 % (ref 43–78)
NRBC # BLD: 0 K/UL (ref 0–0.2)
PLATELET # BLD AUTO: 252 K/UL (ref 150–450)
PMV BLD AUTO: 10.7 FL (ref 9.4–12.3)
RBC # BLD AUTO: 4.68 M/UL (ref 4.05–5.2)
WBC # BLD AUTO: 5.2 K/UL (ref 4.3–11.1)

## 2023-04-25 LAB
25(OH)D3 SERPL-MCNC: 37.8 NG/ML (ref 30–100)
ALBUMIN SERPL-MCNC: 4 G/DL (ref 3.2–4.6)
ALBUMIN/GLOB SERPL: 1.3 (ref 0.4–1.6)
ALP SERPL-CCNC: 75 U/L (ref 50–136)
ALT SERPL-CCNC: 27 U/L (ref 12–65)
ANION GAP SERPL CALC-SCNC: 4 MMOL/L (ref 2–11)
AST SERPL-CCNC: 23 U/L (ref 15–37)
BILIRUB SERPL-MCNC: 0.9 MG/DL (ref 0.2–1.1)
BUN SERPL-MCNC: 14 MG/DL (ref 8–23)
CALCIUM SERPL-MCNC: 9.5 MG/DL (ref 8.3–10.4)
CHLORIDE SERPL-SCNC: 105 MMOL/L (ref 101–110)
CHOLEST SERPL-MCNC: 142 MG/DL
CO2 SERPL-SCNC: 27 MMOL/L (ref 21–32)
CREAT SERPL-MCNC: 0.8 MG/DL (ref 0.6–1)
GLOBULIN SER CALC-MCNC: 3.1 G/DL (ref 2.8–4.5)
GLUCOSE SERPL-MCNC: 97 MG/DL (ref 65–100)
HDLC SERPL-MCNC: 81 MG/DL (ref 40–60)
HDLC SERPL: 1.8
LDLC SERPL CALC-MCNC: 54.2 MG/DL
POTASSIUM SERPL-SCNC: 4.3 MMOL/L (ref 3.5–5.1)
PROT SERPL-MCNC: 7.1 G/DL (ref 6.3–8.2)
SODIUM SERPL-SCNC: 136 MMOL/L (ref 133–143)
T4 FREE SERPL-MCNC: 1.1 NG/DL (ref 0.78–1.46)
TRIGL SERPL-MCNC: 34 MG/DL (ref 35–150)
TSH, 3RD GENERATION: 1.74 UIU/ML (ref 0.36–3.74)
VIT B12 SERPL-MCNC: 1018 PG/ML (ref 193–986)
VLDLC SERPL CALC-MCNC: 6.8 MG/DL (ref 6–23)

## 2023-05-02 ENCOUNTER — OFFICE VISIT (OUTPATIENT)
Dept: INTERNAL MEDICINE CLINIC | Facility: CLINIC | Age: 73
End: 2023-05-02
Payer: MEDICARE

## 2023-05-02 VITALS
RESPIRATION RATE: 16 BRPM | BODY MASS INDEX: 22.13 KG/M2 | HEIGHT: 68 IN | WEIGHT: 146 LBS | DIASTOLIC BLOOD PRESSURE: 78 MMHG | TEMPERATURE: 97.1 F | HEART RATE: 84 BPM | OXYGEN SATURATION: 98 % | SYSTOLIC BLOOD PRESSURE: 132 MMHG

## 2023-05-02 DIAGNOSIS — I67.2 INTRACRANIAL ATHEROSCLEROSIS: ICD-10-CM

## 2023-05-02 DIAGNOSIS — G20 PARKINSON'S DISEASE (HCC): Primary | ICD-10-CM

## 2023-05-02 DIAGNOSIS — G62.9 NEUROPATHY: ICD-10-CM

## 2023-05-02 DIAGNOSIS — F41.9 ANXIETY AND DEPRESSION: ICD-10-CM

## 2023-05-02 DIAGNOSIS — R00.2 PALPITATIONS: ICD-10-CM

## 2023-05-02 DIAGNOSIS — F32.A ANXIETY AND DEPRESSION: ICD-10-CM

## 2023-05-02 PROCEDURE — G8420 CALC BMI NORM PARAMETERS: HCPCS | Performed by: STUDENT IN AN ORGANIZED HEALTH CARE EDUCATION/TRAINING PROGRAM

## 2023-05-02 PROCEDURE — 1036F TOBACCO NON-USER: CPT | Performed by: STUDENT IN AN ORGANIZED HEALTH CARE EDUCATION/TRAINING PROGRAM

## 2023-05-02 PROCEDURE — 99214 OFFICE O/P EST MOD 30 MIN: CPT | Performed by: STUDENT IN AN ORGANIZED HEALTH CARE EDUCATION/TRAINING PROGRAM

## 2023-05-02 PROCEDURE — G8427 DOCREV CUR MEDS BY ELIG CLIN: HCPCS | Performed by: STUDENT IN AN ORGANIZED HEALTH CARE EDUCATION/TRAINING PROGRAM

## 2023-05-02 PROCEDURE — G8399 PT W/DXA RESULTS DOCUMENT: HCPCS | Performed by: STUDENT IN AN ORGANIZED HEALTH CARE EDUCATION/TRAINING PROGRAM

## 2023-05-02 PROCEDURE — 1123F ACP DISCUSS/DSCN MKR DOCD: CPT | Performed by: STUDENT IN AN ORGANIZED HEALTH CARE EDUCATION/TRAINING PROGRAM

## 2023-05-02 PROCEDURE — 3017F COLORECTAL CA SCREEN DOC REV: CPT | Performed by: STUDENT IN AN ORGANIZED HEALTH CARE EDUCATION/TRAINING PROGRAM

## 2023-05-02 PROCEDURE — 1090F PRES/ABSN URINE INCON ASSESS: CPT | Performed by: STUDENT IN AN ORGANIZED HEALTH CARE EDUCATION/TRAINING PROGRAM

## 2023-05-02 RX ORDER — PRAVASTATIN SODIUM 20 MG
20 TABLET ORAL NIGHTLY
Qty: 90 TABLET | Refills: 1 | Status: SHIPPED | OUTPATIENT
Start: 2023-05-02

## 2023-05-02 RX ORDER — METOPROLOL SUCCINATE 25 MG/1
TABLET, EXTENDED RELEASE ORAL
Qty: 135 TABLET | Refills: 1 | Status: SHIPPED | OUTPATIENT
Start: 2023-05-02

## 2023-05-02 ASSESSMENT — ENCOUNTER SYMPTOMS
VOMITING: 0
SHORTNESS OF BREATH: 0
TROUBLE SWALLOWING: 0
NAUSEA: 0
ANAL BLEEDING: 0
DIARRHEA: 0
CONSTIPATION: 0
BLOOD IN STOOL: 0
ABDOMINAL PAIN: 0

## 2023-05-02 ASSESSMENT — PATIENT HEALTH QUESTIONNAIRE - PHQ9
7. TROUBLE CONCENTRATING ON THINGS, SUCH AS READING THE NEWSPAPER OR WATCHING TELEVISION: 0
8. MOVING OR SPEAKING SO SLOWLY THAT OTHER PEOPLE COULD HAVE NOTICED. OR THE OPPOSITE, BEING SO FIGETY OR RESTLESS THAT YOU HAVE BEEN MOVING AROUND A LOT MORE THAN USUAL: 0
9. THOUGHTS THAT YOU WOULD BE BETTER OFF DEAD, OR OF HURTING YOURSELF: 1
3. TROUBLE FALLING OR STAYING ASLEEP: 3
10. IF YOU CHECKED OFF ANY PROBLEMS, HOW DIFFICULT HAVE THESE PROBLEMS MADE IT FOR YOU TO DO YOUR WORK, TAKE CARE OF THINGS AT HOME, OR GET ALONG WITH OTHER PEOPLE: 1
SUM OF ALL RESPONSES TO PHQ QUESTIONS 1-9: 13
SUM OF ALL RESPONSES TO PHQ QUESTIONS 1-9: 14
2. FEELING DOWN, DEPRESSED OR HOPELESS: 3
SUM OF ALL RESPONSES TO PHQ9 QUESTIONS 1 & 2: 6
4. FEELING TIRED OR HAVING LITTLE ENERGY: 3
6. FEELING BAD ABOUT YOURSELF - OR THAT YOU ARE A FAILURE OR HAVE LET YOURSELF OR YOUR FAMILY DOWN: 1
1. LITTLE INTEREST OR PLEASURE IN DOING THINGS: 3
5. POOR APPETITE OR OVEREATING: 0

## 2023-05-02 ASSESSMENT — COLUMBIA-SUICIDE SEVERITY RATING SCALE - C-SSRS
2. HAVE YOU ACTUALLY HAD ANY THOUGHTS OF KILLING YOURSELF?: YES
4. HAVE YOU HAD THESE THOUGHTS AND HAD SOME INTENTION OF ACTING ON THEM?: NO
3. HAVE YOU BEEN THINKING ABOUT HOW YOU MIGHT KILL YOURSELF?: NO
5. HAVE YOU STARTED TO WORK OUT OR WORKED OUT THE DETAILS OF HOW TO KILL YOURSELF? DO YOU INTEND TO CARRY OUT THIS PLAN?: NO
1. WITHIN THE PAST MONTH, HAVE YOU WISHED YOU WERE DEAD OR WISHED YOU COULD GO TO SLEEP AND NOT WAKE UP?: YES
6. HAVE YOU EVER DONE ANYTHING, STARTED TO DO ANYTHING, OR PREPARED TO DO ANYTHING TO END YOUR LIFE?: NO

## 2023-06-14 PROBLEM — G47.33 OSA (OBSTRUCTIVE SLEEP APNEA): Status: ACTIVE | Noted: 2023-06-14

## 2023-06-14 PROBLEM — G20.C PARKINSONIAN TREMOR: Status: ACTIVE | Noted: 2023-06-14

## 2023-06-14 PROBLEM — G47.8 NON-RESTORATIVE SLEEP: Status: ACTIVE | Noted: 2023-06-14

## 2023-06-14 PROBLEM — G47.34 NOCTURNAL HYPOXEMIA: Status: ACTIVE | Noted: 2023-06-14

## 2023-06-14 PROBLEM — G20 PARKINSONIAN TREMOR (HCC): Status: ACTIVE | Noted: 2023-06-14

## 2023-07-21 ENCOUNTER — OFFICE VISIT (OUTPATIENT)
Dept: NEUROLOGY | Age: 73
End: 2023-07-21
Payer: MEDICARE

## 2023-07-21 VITALS
DIASTOLIC BLOOD PRESSURE: 72 MMHG | WEIGHT: 147 LBS | HEART RATE: 72 BPM | OXYGEN SATURATION: 94 % | SYSTOLIC BLOOD PRESSURE: 112 MMHG | BODY MASS INDEX: 22.35 KG/M2

## 2023-07-21 DIAGNOSIS — G25.2 RESTING TREMOR: ICD-10-CM

## 2023-07-21 DIAGNOSIS — G20 PARKINSON'S DISEASE (HCC): Primary | ICD-10-CM

## 2023-07-21 DIAGNOSIS — R53.82 CHRONIC FATIGUE: ICD-10-CM

## 2023-07-21 DIAGNOSIS — Z86.73 HISTORY OF TIA (TRANSIENT ISCHEMIC ATTACK): ICD-10-CM

## 2023-07-21 PROCEDURE — G8399 PT W/DXA RESULTS DOCUMENT: HCPCS | Performed by: PSYCHIATRY & NEUROLOGY

## 2023-07-21 PROCEDURE — 99214 OFFICE O/P EST MOD 30 MIN: CPT | Performed by: PSYCHIATRY & NEUROLOGY

## 2023-07-21 PROCEDURE — G8420 CALC BMI NORM PARAMETERS: HCPCS | Performed by: PSYCHIATRY & NEUROLOGY

## 2023-07-21 PROCEDURE — 3017F COLORECTAL CA SCREEN DOC REV: CPT | Performed by: PSYCHIATRY & NEUROLOGY

## 2023-07-21 PROCEDURE — 1036F TOBACCO NON-USER: CPT | Performed by: PSYCHIATRY & NEUROLOGY

## 2023-07-21 PROCEDURE — 1090F PRES/ABSN URINE INCON ASSESS: CPT | Performed by: PSYCHIATRY & NEUROLOGY

## 2023-07-21 PROCEDURE — 1123F ACP DISCUSS/DSCN MKR DOCD: CPT | Performed by: PSYCHIATRY & NEUROLOGY

## 2023-07-21 PROCEDURE — G8427 DOCREV CUR MEDS BY ELIG CLIN: HCPCS | Performed by: PSYCHIATRY & NEUROLOGY

## 2023-07-21 ASSESSMENT — ENCOUNTER SYMPTOMS
COUGH: 0
CONSTIPATION: 0

## 2023-08-30 ENCOUNTER — HOSPITAL ENCOUNTER (EMERGENCY)
Age: 73
Discharge: HOME | End: 2023-08-30
Attending: EMERGENCY MEDICINE
Payer: MEDICARE

## 2023-08-30 VITALS
DIASTOLIC BLOOD PRESSURE: 73 MMHG | RESPIRATION RATE: 16 BRPM | WEIGHT: 147 LBS | TEMPERATURE: 98.4 F | HEART RATE: 83 BPM | BODY MASS INDEX: 22.28 KG/M2 | HEIGHT: 68 IN | OXYGEN SATURATION: 100 % | SYSTOLIC BLOOD PRESSURE: 121 MMHG

## 2023-08-30 DIAGNOSIS — G20 PARKINSON DISEASE (HCC): ICD-10-CM

## 2023-08-30 DIAGNOSIS — R45.851 SUICIDAL IDEATION: Primary | ICD-10-CM

## 2023-08-30 PROBLEM — F33.1 MDD (MAJOR DEPRESSIVE DISORDER), RECURRENT EPISODE, MODERATE (HCC): Status: ACTIVE | Noted: 2023-08-30

## 2023-08-30 PROBLEM — R45.850 HOMICIDAL THOUGHTS: Status: ACTIVE | Noted: 2023-08-30

## 2023-08-30 PROBLEM — G20.A1 PARKINSON DISEASE: Status: ACTIVE | Noted: 2023-08-30

## 2023-08-30 LAB
ALBUMIN SERPL-MCNC: 4.3 G/DL (ref 3.2–4.6)
ALBUMIN/GLOB SERPL: 1.3 (ref 0.4–1.6)
ALP SERPL-CCNC: 75 U/L (ref 50–136)
ALT SERPL-CCNC: 28 U/L (ref 12–65)
AMPHET UR QL SCN: NEGATIVE
ANION GAP SERPL CALC-SCNC: 5 MMOL/L (ref 2–11)
APAP SERPL-MCNC: <2 UG/ML (ref 10–30)
AST SERPL-CCNC: 29 U/L (ref 15–37)
BARBITURATES UR QL SCN: NEGATIVE
BASOPHILS # BLD: 0.1 K/UL (ref 0–0.2)
BASOPHILS NFR BLD: 1 % (ref 0–2)
BENZODIAZ UR QL: POSITIVE
BILIRUB SERPL-MCNC: 1.4 MG/DL (ref 0.2–1.1)
BUN SERPL-MCNC: 12 MG/DL (ref 8–23)
CALCIUM SERPL-MCNC: 10 MG/DL (ref 8.3–10.4)
CANNABINOIDS UR QL SCN: NEGATIVE
CHLORIDE SERPL-SCNC: 104 MMOL/L (ref 101–110)
CO2 SERPL-SCNC: 30 MMOL/L (ref 21–32)
COCAINE UR QL SCN: NEGATIVE
CREAT SERPL-MCNC: 1 MG/DL (ref 0.6–1)
DIFFERENTIAL METHOD BLD: NORMAL
EKG ATRIAL RATE: 131 BPM
EKG DIAGNOSIS: NORMAL
EKG Q-T INTERVAL: 427 MS
EKG QRS DURATION: 129 MS
EKG QTC CALCULATION (BAZETT): 520 MS
EKG R AXIS: 64 DEGREES
EKG T AXIS: 109 DEGREES
EKG VENTRICULAR RATE: 86 BPM
EOSINOPHIL # BLD: 0 K/UL (ref 0–0.8)
EOSINOPHIL NFR BLD: 1 % (ref 0.5–7.8)
ERYTHROCYTE [DISTWIDTH] IN BLOOD BY AUTOMATED COUNT: 12.6 % (ref 11.9–14.6)
ETHANOL SERPL-MCNC: <3 MG/DL (ref 0–0.08)
GLOBULIN SER CALC-MCNC: 3.4 G/DL (ref 2.8–4.5)
GLUCOSE SERPL-MCNC: 107 MG/DL (ref 65–100)
HCT VFR BLD AUTO: 43.3 % (ref 35.8–46.3)
HGB BLD-MCNC: 14.7 G/DL (ref 11.7–15.4)
IMM GRANULOCYTES # BLD AUTO: 0 K/UL (ref 0–0.5)
IMM GRANULOCYTES NFR BLD AUTO: 0 % (ref 0–5)
LYMPHOCYTES # BLD: 0.8 K/UL (ref 0.5–4.6)
LYMPHOCYTES NFR BLD: 16 % (ref 13–44)
MAGNESIUM SERPL-MCNC: 2.5 MG/DL (ref 1.8–2.4)
MCH RBC QN AUTO: 29.6 PG (ref 26.1–32.9)
MCHC RBC AUTO-ENTMCNC: 33.9 G/DL (ref 31.4–35)
MCV RBC AUTO: 87.1 FL (ref 82–102)
METHADONE UR QL: NEGATIVE
MONOCYTES # BLD: 0.4 K/UL (ref 0.1–1.3)
MONOCYTES NFR BLD: 7 % (ref 4–12)
NEUTS SEG # BLD: 3.6 K/UL (ref 1.7–8.2)
NEUTS SEG NFR BLD: 75 % (ref 43–78)
NRBC # BLD: 0 K/UL (ref 0–0.2)
OPIATES UR QL: NEGATIVE
PCP UR QL: NEGATIVE
PLATELET # BLD AUTO: 253 K/UL (ref 150–450)
PMV BLD AUTO: 10 FL (ref 9.4–12.3)
POTASSIUM SERPL-SCNC: 4.5 MMOL/L (ref 3.5–5.1)
PROT SERPL-MCNC: 7.7 G/DL (ref 6.3–8.2)
RBC # BLD AUTO: 4.97 M/UL (ref 4.05–5.2)
SALICYLATES SERPL-MCNC: <1.7 MG/DL (ref 2.8–20)
SODIUM SERPL-SCNC: 139 MMOL/L (ref 133–143)
WBC # BLD AUTO: 4.8 K/UL (ref 4.3–11.1)

## 2023-08-30 PROCEDURE — 6370000000 HC RX 637 (ALT 250 FOR IP): Performed by: EMERGENCY MEDICINE

## 2023-08-30 PROCEDURE — 82077 ASSAY SPEC XCP UR&BREATH IA: CPT

## 2023-08-30 PROCEDURE — 93010 ELECTROCARDIOGRAM REPORT: CPT | Performed by: INTERNAL MEDICINE

## 2023-08-30 PROCEDURE — 85025 COMPLETE CBC W/AUTO DIFF WBC: CPT

## 2023-08-30 PROCEDURE — 80307 DRUG TEST PRSMV CHEM ANLYZR: CPT

## 2023-08-30 PROCEDURE — 80179 DRUG ASSAY SALICYLATE: CPT

## 2023-08-30 PROCEDURE — 80143 DRUG ASSAY ACETAMINOPHEN: CPT

## 2023-08-30 PROCEDURE — 93005 ELECTROCARDIOGRAM TRACING: CPT | Performed by: EMERGENCY MEDICINE

## 2023-08-30 PROCEDURE — 80053 COMPREHEN METABOLIC PANEL: CPT

## 2023-08-30 PROCEDURE — 99285 EMERGENCY DEPT VISIT HI MDM: CPT

## 2023-08-30 PROCEDURE — 83735 ASSAY OF MAGNESIUM: CPT

## 2023-08-30 RX ORDER — ALPRAZOLAM 0.5 MG/1
0.5 TABLET ORAL
Status: COMPLETED | OUTPATIENT
Start: 2023-08-30 | End: 2023-08-30

## 2023-08-30 RX ADMIN — ALPRAZOLAM 0.5 MG: 0.5 TABLET ORAL at 12:03

## 2023-08-30 ASSESSMENT — PAIN SCALES - GENERAL: PAINLEVEL_OUTOF10: 2

## 2023-08-30 ASSESSMENT — LIFESTYLE VARIABLES
HOW OFTEN DO YOU HAVE A DRINK CONTAINING ALCOHOL: NEVER
HOW MANY STANDARD DRINKS CONTAINING ALCOHOL DO YOU HAVE ON A TYPICAL DAY: PATIENT DOES NOT DRINK

## 2023-08-30 ASSESSMENT — PAIN DESCRIPTION - DESCRIPTORS: DESCRIPTORS: ACHING;DISCOMFORT

## 2023-08-30 ASSESSMENT — PATIENT HEALTH QUESTIONNAIRE - PHQ9: SUM OF ALL RESPONSES TO PHQ QUESTIONS 1-9: 15

## 2023-08-30 ASSESSMENT — PAIN DESCRIPTION - FREQUENCY: FREQUENCY: CONTINUOUS

## 2023-08-30 ASSESSMENT — PAIN DESCRIPTION - LOCATION: LOCATION: NECK

## 2023-08-30 ASSESSMENT — PAIN DESCRIPTION - PAIN TYPE: TYPE: ACUTE PAIN

## 2023-08-30 ASSESSMENT — PAIN - FUNCTIONAL ASSESSMENT: PAIN_FUNCTIONAL_ASSESSMENT: 0-10

## 2023-08-30 NOTE — CARE COORDINATION
Follow up appointment is made for patient to have an intake for intensive outpatient treatment at [facility]. The appointment is tomorrow 08/31/2023 at 11:00 am.  Patient is provided the address and appointment reminder for this. Spouse confirms he will drive patient.

## 2023-08-30 NOTE — CARE COORDINATION
Patient lives at home with spouse, daughter, son-in-law and grandchild. She has parkinson's but is independent with no current DME. Hx of using [facility] services but no current services. Patient states she has thoughts of hurting herself because for the last several months she has been experiencing the urge to hurt people. No specific person is identified as a target - just anyone. She notices that TV commercials for  \"True Crime\" advertisements seems to be a trigger. In the past she has been able to manage these feeling by practicing meditation but that diversion is no longer working. She almost never uses her prescribed Xanax to manage these feelings but the last few days has increased her use. 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 She reports no new medications and is fearful of starting any new medications for her parkinson's   08/30/23 0913   Service Assessment   Patient Orientation Alert and Oriented   Cognition Alert   History Provided By Patient   Primary Caregiver Self   Accompanied By/Relationship spouse [employer] [facility] [address] Other;Children   Patient's Healthcare Decision Maker is: Legal Next of Kin  (spouse)   PCP Verified by CM Yes   Last Visit to PCP Within last 3 months   Prior Functional Level Independent in ADLs/IADLs   Current Functional Level Independent in ADLs/IADLs   Can patient return to prior living arrangement Yes   Ability to make needs known: Good   Family able to assist with home care needs: Yes   Would you like for me to discuss the discharge plan with any other family members/significant others, and if so, who? No   Financial Resources Medicare; Other (Comment)   Community Resources None   CM/SW Referral Psychiatry   Social/Functional History   Lives With Spouse;Son;Daughter   Type of [address] [place] [address] Avenue to enter with rails   Entrance Stairs - Number of Steps 2   Bathroom

## 2023-08-30 NOTE — ACP (ADVANCE CARE PLANNING)
Advance Care Planning   Healthcare Decision Maker:    Primary Decision Maker: Mckenzie Daveanastasia - [facility] - [phone]    Click here to complete Healthcare Decision Makers including selection of the Healthcare Decision Maker Relationship (ie \"Primary\").

## 2023-08-30 NOTE — ED TRIAGE NOTES
Pt reports in the last couple months has had thoughts of wishing she was dead because \"it would be easier\" and has thought about \"taking drugs\" to overdose. Denies previous suicide attempt or self-injurious behavior, reports depression since start of pandemic. Pt also endorses thoughts of harming others \"wanting to stab people\" without specific target/person. Pt reports supposed to start Levodopa but has not started yet. No hallucinations at this time. Pt was diagnosed with Parkinson's disease 3 years ago.   A&Ox4

## 2023-08-30 NOTE — DISCHARGE INSTRUCTIONS
Plan to follow-up tomorrow at [facility] for behavioral health for intensive outpatient therapy.

## 2023-09-09 ENCOUNTER — HOSPITAL ENCOUNTER (EMERGENCY)
Age: 73
Discharge: HOME | End: 2023-09-09
Attending: EMERGENCY MEDICINE
Payer: MEDICARE

## 2023-09-09 VITALS
RESPIRATION RATE: 16 BRPM | HEIGHT: 68 IN | TEMPERATURE: 97.5 F | OXYGEN SATURATION: 97 % | WEIGHT: 145 LBS | BODY MASS INDEX: 21.98 KG/M2 | HEART RATE: 91 BPM | DIASTOLIC BLOOD PRESSURE: 64 MMHG | SYSTOLIC BLOOD PRESSURE: 130 MMHG

## 2023-09-09 DIAGNOSIS — Z78.9 MEDICATION INTOLERANCE: Primary | ICD-10-CM

## 2023-09-09 PROBLEM — R94.31 ELECTROCARDIOGRAM ABNORMAL: Status: ACTIVE | Noted: 2023-09-09

## 2023-09-09 PROBLEM — J31.0 NON-ALLERGIC RHINITIS: Status: ACTIVE | Noted: 2023-09-09

## 2023-09-09 PROBLEM — R09.81 NASAL CONGESTION: Status: ACTIVE | Noted: 2023-09-09

## 2023-09-09 PROBLEM — H69.90 EUSTACHIAN TUBE DYSFUNCTION: Status: ACTIVE | Noted: 2023-09-09

## 2023-09-09 PROBLEM — H69.80 EUSTACHIAN TUBE DYSFUNCTION: Status: ACTIVE | Noted: 2023-09-09

## 2023-09-09 PROBLEM — L30.9 DERMATITIS: Status: ACTIVE | Noted: 2023-09-09

## 2023-09-09 PROBLEM — B19.20 VIRAL HEPATITIS C: Status: ACTIVE | Noted: 2023-09-09

## 2023-09-09 LAB
FLUAV RNA SPEC QL NAA+PROBE: NOT DETECTED
FLUBV RNA SPEC QL NAA+PROBE: NOT DETECTED
SARS-COV-2 RDRP RESP QL NAA+PROBE: NOT DETECTED
SOURCE: NORMAL

## 2023-09-09 PROCEDURE — 87635 SARS-COV-2 COVID-19 AMP PRB: CPT

## 2023-09-09 PROCEDURE — 87502 INFLUENZA DNA AMP PROBE: CPT

## 2023-09-09 PROCEDURE — 99285 EMERGENCY DEPT VISIT HI MDM: CPT

## 2023-09-09 ASSESSMENT — ENCOUNTER SYMPTOMS
VOMITING: 0
ABDOMINAL PAIN: 0
NAUSEA: 0
DIARRHEA: 0
COLOR CHANGE: 0
SHORTNESS OF BREATH: 0

## 2023-09-09 ASSESSMENT — LIFESTYLE VARIABLES
HOW MANY STANDARD DRINKS CONTAINING ALCOHOL DO YOU HAVE ON A TYPICAL DAY: PATIENT DOES NOT DRINK
HOW OFTEN DO YOU HAVE A DRINK CONTAINING ALCOHOL: NEVER

## 2023-09-09 ASSESSMENT — PAIN SCALES - GENERAL: PAINLEVEL_OUTOF10: 8

## 2023-09-09 NOTE — DISCHARGE INSTRUCTIONS
Evaluated in the emergency department today due to possible allergic reaction. Based on your history and physical exam, this sounds more like an intolerance to these medications or other side effects. I do recommend that you stop the Lexapro as you have been taking only half a dose.   I do recommend that you continue to take the carbidopa levodopa until you can speak with your neurologist on Monday    I do recommend that you contact your psychiatrist on Monday to discuss your intolerance of medication    You were swabbed for COVID and influenza today as they can cause some of these effects that you were feeling today    Covid and flu negative     Return to the emergency department if you are experiencing a rash, swelling of lips, swelling of tongue, inability to handle your secretions, wheezing, peeling of skin or blistering of skin, peeling or blistering of the inside of your mouth    Return for any chest pain, shortness of breath, signs and symptoms of stroke as listed in your discharge paperwork

## 2023-09-09 NOTE — ED TRIAGE NOTES
Pt arrives to ER with c/o medication reaction. Pt states starting new medications on Wednesday. Pt states having chills, burning sensation all over, nausea, and shob since starting ne w meds. Pt airway is patent in triage.

## 2023-09-24 NOTE — PROGRESS NOTES
Gabe Oviedo (:  1950) is a 72 y.o. female,Established patient, here for evaluation of the following chief complaint(s):  3 Month Follow-Up (Would like discuss mental health), Anxiety, Depression, and Medication Refill         ASSESSMENT/PLAN:  1. 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 Anxiety and depression  Intolerance to lexapro   Previously on prn alprazolam but switched by mental health specialist to prn clonazepam   PDMP reviewed showing clonazepam 0.5 mg tablets, quantity #15, 30-day supply last filled on 2023. Per chart review patient should have 1 refill left of this medication. Educated patient if worsening mood symptoms including new onset suicidal/homicidal ideations that she is to contact provider on-call or seek other immediate medical attention  Reportedly has appointment in upcoming weeks to months with a mental health provider having been arranged by [facility] for behavioral health  Controlled substance agreement signed in the office today    3. Palpitations  Continue metoprolol succinate    - metoprolol succinate (TOPROL XL) 25 MG extended release tablet; Take 1.5 tablets by mouth daily  Dispense: 135 tablet; Refill: 1    4. Mouth sores  Possibly secondary to use of Listerine mouthwash. Alternatively could represent cold sore  Start trial of Magic mouthwash, agree with holding off Listerine  If no improvement with Magic mouthwash consider trial of Valtrex    - Magic Mouthwash (MIRACLE MOUTHWASH);  Swish and spit 5 mLs 4 times daily as needed for

## 2023-09-26 ENCOUNTER — OFFICE VISIT (OUTPATIENT)
Dept: INTERNAL MEDICINE CLINIC | Facility: CLINIC | Age: 73
End: 2023-09-26
Payer: MEDICARE

## 2023-09-26 VITALS
RESPIRATION RATE: 16 BRPM | HEART RATE: 82 BPM | HEIGHT: 68 IN | WEIGHT: 145 LBS | BODY MASS INDEX: 21.98 KG/M2 | SYSTOLIC BLOOD PRESSURE: 108 MMHG | DIASTOLIC BLOOD PRESSURE: 78 MMHG | TEMPERATURE: 98.1 F

## 2023-09-26 DIAGNOSIS — F41.9 ANXIETY AND DEPRESSION: ICD-10-CM

## 2023-09-26 DIAGNOSIS — G20.A1 PARKINSON'S DISEASE: Primary | ICD-10-CM

## 2023-09-26 DIAGNOSIS — R00.2 PALPITATIONS: ICD-10-CM

## 2023-09-26 DIAGNOSIS — F32.A ANXIETY AND DEPRESSION: ICD-10-CM

## 2023-09-26 DIAGNOSIS — K13.79 MOUTH SORES: ICD-10-CM

## 2023-09-26 PROCEDURE — G8420 CALC BMI NORM PARAMETERS: HCPCS | Performed by: STUDENT IN AN ORGANIZED HEALTH CARE EDUCATION/TRAINING PROGRAM

## 2023-09-26 PROCEDURE — G8427 DOCREV CUR MEDS BY ELIG CLIN: HCPCS | Performed by: STUDENT IN AN ORGANIZED HEALTH CARE EDUCATION/TRAINING PROGRAM

## 2023-09-26 PROCEDURE — 3017F COLORECTAL CA SCREEN DOC REV: CPT | Performed by: STUDENT IN AN ORGANIZED HEALTH CARE EDUCATION/TRAINING PROGRAM

## 2023-09-26 PROCEDURE — 1036F TOBACCO NON-USER: CPT | Performed by: STUDENT IN AN ORGANIZED HEALTH CARE EDUCATION/TRAINING PROGRAM

## 2023-09-26 PROCEDURE — G8399 PT W/DXA RESULTS DOCUMENT: HCPCS | Performed by: STUDENT IN AN ORGANIZED HEALTH CARE EDUCATION/TRAINING PROGRAM

## 2023-09-26 PROCEDURE — 1090F PRES/ABSN URINE INCON ASSESS: CPT | Performed by: STUDENT IN AN ORGANIZED HEALTH CARE EDUCATION/TRAINING PROGRAM

## 2023-09-26 PROCEDURE — 99214 OFFICE O/P EST MOD 30 MIN: CPT | Performed by: STUDENT IN AN ORGANIZED HEALTH CARE EDUCATION/TRAINING PROGRAM

## 2023-09-26 PROCEDURE — 1123F ACP DISCUSS/DSCN MKR DOCD: CPT | Performed by: STUDENT IN AN ORGANIZED HEALTH CARE EDUCATION/TRAINING PROGRAM

## 2023-09-26 RX ORDER — METOPROLOL SUCCINATE 25 MG/1
TABLET, EXTENDED RELEASE ORAL
Qty: 135 TABLET | Refills: 1 | Status: SHIPPED | OUTPATIENT
Start: 2023-09-26

## 2023-09-26 RX ORDER — CLONAZEPAM 0.5 MG/1
0.5 TABLET ORAL 2 TIMES DAILY PRN
COMMUNITY
Start: 2023-09-12

## 2023-09-26 RX ORDER — ESCITALOPRAM OXALATE 10 MG/1
TABLET ORAL
COMMUNITY
Start: 2023-09-05

## 2023-09-26 ASSESSMENT — ENCOUNTER SYMPTOMS
ANAL BLEEDING: 0
ABDOMINAL PAIN: 0
SHORTNESS OF BREATH: 0
VOMITING: 0
NAUSEA: 0
BLOOD IN STOOL: 0

## 2023-10-18 ENCOUNTER — CLINICAL DOCUMENTATION (OUTPATIENT)
Dept: NEUROLOGY | Age: 73
End: 2023-10-18

## 2023-10-18 ENCOUNTER — OFFICE VISIT (OUTPATIENT)
Dept: NEUROLOGY | Age: 73
End: 2023-10-18

## 2023-10-18 VITALS
DIASTOLIC BLOOD PRESSURE: 74 MMHG | SYSTOLIC BLOOD PRESSURE: 105 MMHG | OXYGEN SATURATION: 98 % | WEIGHT: 141.8 LBS | HEART RATE: 92 BPM | BODY MASS INDEX: 21.49 KG/M2 | HEIGHT: 68 IN

## 2023-10-18 DIAGNOSIS — Z86.73 HISTORY OF TIA (TRANSIENT ISCHEMIC ATTACK): ICD-10-CM

## 2023-10-18 DIAGNOSIS — R49.8 HYPOPHONIA: ICD-10-CM

## 2023-10-18 DIAGNOSIS — G20.A1 PARKINSON'S DISEASE, UNSPECIFIED WHETHER DYSKINESIA PRESENT, UNSPECIFIED WHETHER MANIFESTATIONS FLUCTUATE: Primary | ICD-10-CM

## 2023-10-18 DIAGNOSIS — G25.2 RESTING TREMOR: ICD-10-CM

## 2023-10-18 DIAGNOSIS — R53.82 CHRONIC FATIGUE: ICD-10-CM

## 2023-10-18 DIAGNOSIS — G47.33 OSA (OBSTRUCTIVE SLEEP APNEA): ICD-10-CM

## 2023-10-18 DIAGNOSIS — F32.A ANXIETY AND DEPRESSION: ICD-10-CM

## 2023-10-18 DIAGNOSIS — F41.9 ANXIETY AND DEPRESSION: ICD-10-CM

## 2023-10-18 ASSESSMENT — PATIENT HEALTH QUESTIONNAIRE - PHQ9
SUM OF ALL RESPONSES TO PHQ QUESTIONS 1-9: 2
SUM OF ALL RESPONSES TO PHQ9 QUESTIONS 1 & 2: 2
2. FEELING DOWN, DEPRESSED OR HOPELESS: 1
SUM OF ALL RESPONSES TO PHQ QUESTIONS 1-9: 2
1. LITTLE INTEREST OR PLEASURE IN DOING THINGS: 1
SUM OF ALL RESPONSES TO PHQ QUESTIONS 1-9: 2
SUM OF ALL RESPONSES TO PHQ QUESTIONS 1-9: 2

## 2023-10-18 ASSESSMENT — ENCOUNTER SYMPTOMS
TROUBLE SWALLOWING: 0
EYE PAIN: 0
COUGH: 0
VOICE CHANGE: 1
CONSTIPATION: 1

## 2023-10-18 NOTE — PROGRESS NOTES
Called [facility] for behavioral health to see what there criteria is for receiving patients records, no answer. Phone rings and eventually just hangs up.

## 2023-10-18 NOTE — PROGRESS NOTES
She does note some increased drooling and loss of facial expression has been noted. She continues to have significant difficulty with chronic fatigue during the day. She recently had a sleep study/consultation which did show evidence of mild obstructive sleep apnea and an oral appliance was recommended as well as other sleep hygiene and positional therapies. There has been no history of RBD. She continues to have very little in the way of other nonmotor symptoms. No constipation. She remains on aspirin 81 mg daily for previous history of TIA. No other strokelike symptoms. Review of Systems:   Review of Systems   Constitutional:  Positive for fatigue. HENT:  Positive for voice change. Negative for trouble swallowing. Eyes:  Negative for pain and visual disturbance. Respiratory:  Negative for cough. Cardiovascular:  Negative for chest pain. Gastrointestinal:  Positive for constipation. Endocrine: Negative for polyphagia. Genitourinary:  Negative for dysuria. Musculoskeletal:  Positive for arthralgias and neck pain. Skin:  Negative for rash. Neurological:  Positive for dizziness, tremors and light-headedness. Psychiatric/Behavioral:  Positive for behavioral problems and sleep disturbance. Negative for hallucinations. The patient is nervous/anxious. Neurology ROS per HPI above. Lab/Imaging Review:   I REVIEWED PERTINENT LABS, IMAGES, AND REPORTS WITH THE PATIENT PERSONALLY, DIRECTLY AND FULLY. THE MOST PERTINENT FINDINGS ARE NOTED BELOW:    CTA HEAD NECK W CONTRAST  Narrative: Title:  CT arteriogram of the neck and head. Indication: Dizziness. .    Technique: Axial images of the neck and head were obtained after the uneventful   administration of intravenous iodinated contrast media. Contrast was used to  best identify the arterial structures.   Images were reviewed on a separate, free  standing, three-dimensional workstation as per the referring physicians

## 2023-10-26 DIAGNOSIS — F32.A ANXIETY AND DEPRESSION: Primary | ICD-10-CM

## 2023-10-26 DIAGNOSIS — I67.2 INTRACRANIAL ATHEROSCLEROSIS: ICD-10-CM

## 2023-10-26 DIAGNOSIS — F41.9 ANXIETY AND DEPRESSION: Primary | ICD-10-CM

## 2023-10-26 RX ORDER — CLONAZEPAM 0.5 MG/1
0.25 TABLET ORAL 2 TIMES DAILY PRN
Qty: 15 TABLET | Refills: 1 | Status: SHIPPED | OUTPATIENT
Start: 2023-11-01 | End: 2023-12-31

## 2023-10-26 RX ORDER — PRAVASTATIN SODIUM 20 MG
20 TABLET ORAL NIGHTLY
Qty: 90 TABLET | Refills: 1 | OUTPATIENT
Start: 2023-10-26

## 2023-10-26 RX ORDER — CLONAZEPAM 0.5 MG/1
0.5 TABLET ORAL 2 TIMES DAILY PRN
Qty: 60 TABLET | Refills: 0 | Status: CANCELLED | OUTPATIENT
Start: 2023-11-01 | End: 2023-12-01

## 2023-10-26 RX ORDER — PRAVASTATIN SODIUM 20 MG
20 TABLET ORAL NIGHTLY
Qty: 90 TABLET | Refills: 1 | Status: SHIPPED | OUTPATIENT
Start: 2023-10-26

## 2023-10-26 NOTE — TELEPHONE ENCOUNTER
Patient requesting refill on clonazepam.  PDMP reviewed showing clonazepam 0.5 mg tablets, quantity #15, 15-day supply last filled on 10/17/2023. Given prior miscommunication between patient, the originator of the prescription and pharmacy, need to confirm how patient is taking medication (whether 1 whole tablet twice a day or 1/2 tablet twice a day) prior to refills being sent in.

## 2023-10-26 NOTE — TELEPHONE ENCOUNTER
Per Dr. Osmar Medrano: This was a huge issue last time patient was seen. Kade did a lot of leg work and we think patient was supposed to be taking equivalent of 0.25mg twice a day. Can you please call patient and confirm how (what mg tablet and whether taking 1/2 or whole tab and how often)? I need to know this and make sure we are all on the same page prior to refilling this prescription. Thanks!

## 2023-10-26 NOTE — TELEPHONE ENCOUNTER
Pt requesting clonazepam refills, please. Scripts last fill date: 10/17/23 for a 15 day supply. Pended to fill on 11/1/23.     Last OV: 9/26/23  Next OV: 11/28/23

## 2023-10-26 NOTE — TELEPHONE ENCOUNTER
Orders Placed This Encounter    clonazePAM (KLONOPIN) 0.5 MG tablet     Sig: Take 0.5 tablets by mouth 2 times daily as needed for Anxiety for up to 60 days. Do not drink alcohol, drive or operate heavy machinery after use as it may cause drowsiness.  DO NOT FILL PRIOR TO 11/1/23 Max Daily Amount: 0.5 mg     Dispense:  15 tablet     Refill:  1     Predated prescription for 11/1/2023 sent to pharmacy based on last fill date via [id number]

## 2023-11-25 NOTE — PROGRESS NOTES
Violet Faust (:  1950) is a 72 y.o. female,Established patient, here for evaluation of the following chief complaint(s):  Depression, Anxiety, and Follow-up         ASSESSMENT/PLAN:  1. 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 Anxiety and depression  Intolerance to lexapro   Previously on prn alprazolam but switched by mental health specialist to prn clonazepam   Previously had appointment scheduled through [facility] for behavioral health for outpatient counseling but per patient had difficulty scheduling. Would be agreeable to seeing a specialist.  Referral to psychiatry placed  Controlled substance agreement signed at last visit  Educated patient if worsening mood symptoms including suicidal/homicidal ideations to contact provider on-call, call 911 or return to [facility] for behavioral health    - External Referral to Psychiatry    3. Palpitations  Echo from 3/16/2020 with EF of 55 to 60%, no right to left shunting, no valvulopathy or pericardial effusion  Continue metoprolol succinate    4. Mouth sore  No improvement with trial of Magic mouthwash  Given remote history of cold sores start empiric Valtrex    - valACYclovir (VALTREX) 1 g tablet; Take 2 tablets by mouth twice a day for 1 day at onset of cold sore outbreak. Dispense: 30 tablet; Refill: 0    5. Nipple anomaly  6. Abnormal breast finding  Suspect lesion dermatological in origin given no palpable associated mass or other breast features.   Possible abrasion

## 2023-11-28 ENCOUNTER — OFFICE VISIT (OUTPATIENT)
Dept: INTERNAL MEDICINE CLINIC | Facility: CLINIC | Age: 73
End: 2023-11-28
Payer: MEDICARE

## 2023-11-28 VITALS
DIASTOLIC BLOOD PRESSURE: 73 MMHG | SYSTOLIC BLOOD PRESSURE: 121 MMHG | BODY MASS INDEX: 21.58 KG/M2 | HEIGHT: 68 IN | RESPIRATION RATE: 16 BRPM | OXYGEN SATURATION: 96 % | HEART RATE: 64 BPM | TEMPERATURE: 97.4 F | WEIGHT: 142.4 LBS

## 2023-11-28 DIAGNOSIS — F41.9 ANXIETY AND DEPRESSION: ICD-10-CM

## 2023-11-28 DIAGNOSIS — F32.A ANXIETY AND DEPRESSION: ICD-10-CM

## 2023-11-28 DIAGNOSIS — K13.79 MOUTH SORE: ICD-10-CM

## 2023-11-28 DIAGNOSIS — G20.B1 PARKINSON'S DISEASE WITH DYSKINESIA, UNSPECIFIED WHETHER MANIFESTATIONS FLUCTUATE: Primary | ICD-10-CM

## 2023-11-28 DIAGNOSIS — R00.2 PALPITATIONS: ICD-10-CM

## 2023-11-28 DIAGNOSIS — N64.59 ABNORMAL BREAST FINDING: ICD-10-CM

## 2023-11-28 DIAGNOSIS — Q83.9 NIPPLE ANOMALY: ICD-10-CM

## 2023-11-28 PROBLEM — R45.850 HOMICIDAL THOUGHTS: Status: RESOLVED | Noted: 2023-08-30 | Resolved: 2023-11-28

## 2023-11-28 PROBLEM — R45.851 SUICIDAL IDEATION: Status: RESOLVED | Noted: 2023-08-30 | Resolved: 2023-11-28

## 2023-11-28 PROCEDURE — G8399 PT W/DXA RESULTS DOCUMENT: HCPCS | Performed by: STUDENT IN AN ORGANIZED HEALTH CARE EDUCATION/TRAINING PROGRAM

## 2023-11-28 PROCEDURE — 1036F TOBACCO NON-USER: CPT | Performed by: STUDENT IN AN ORGANIZED HEALTH CARE EDUCATION/TRAINING PROGRAM

## 2023-11-28 PROCEDURE — G8427 DOCREV CUR MEDS BY ELIG CLIN: HCPCS | Performed by: STUDENT IN AN ORGANIZED HEALTH CARE EDUCATION/TRAINING PROGRAM

## 2023-11-28 PROCEDURE — 99214 OFFICE O/P EST MOD 30 MIN: CPT | Performed by: STUDENT IN AN ORGANIZED HEALTH CARE EDUCATION/TRAINING PROGRAM

## 2023-11-28 PROCEDURE — G8484 FLU IMMUNIZE NO ADMIN: HCPCS | Performed by: STUDENT IN AN ORGANIZED HEALTH CARE EDUCATION/TRAINING PROGRAM

## 2023-11-28 PROCEDURE — 1090F PRES/ABSN URINE INCON ASSESS: CPT | Performed by: STUDENT IN AN ORGANIZED HEALTH CARE EDUCATION/TRAINING PROGRAM

## 2023-11-28 PROCEDURE — G8420 CALC BMI NORM PARAMETERS: HCPCS | Performed by: STUDENT IN AN ORGANIZED HEALTH CARE EDUCATION/TRAINING PROGRAM

## 2023-11-28 PROCEDURE — 3017F COLORECTAL CA SCREEN DOC REV: CPT | Performed by: STUDENT IN AN ORGANIZED HEALTH CARE EDUCATION/TRAINING PROGRAM

## 2023-11-28 PROCEDURE — 1123F ACP DISCUSS/DSCN MKR DOCD: CPT | Performed by: STUDENT IN AN ORGANIZED HEALTH CARE EDUCATION/TRAINING PROGRAM

## 2023-11-28 RX ORDER — VALACYCLOVIR HYDROCHLORIDE 1 G/1
TABLET, FILM COATED ORAL
Qty: 30 TABLET | Refills: 0 | Status: SHIPPED | OUTPATIENT
Start: 2023-11-28

## 2023-11-28 SDOH — ECONOMIC STABILITY: FOOD INSECURITY: WITHIN THE PAST 12 MONTHS, THE FOOD YOU BOUGHT JUST DIDN'T LAST AND YOU DIDN'T HAVE MONEY TO GET MORE.: NEVER TRUE

## 2023-11-28 SDOH — ECONOMIC STABILITY: INCOME INSECURITY: HOW HARD IS IT FOR YOU TO PAY FOR THE VERY BASICS LIKE FOOD, HOUSING, MEDICAL CARE, AND HEATING?: NOT HARD AT ALL

## 2023-11-28 SDOH — ECONOMIC STABILITY: FOOD INSECURITY: WITHIN THE PAST 12 MONTHS, YOU WORRIED THAT YOUR FOOD WOULD RUN OUT BEFORE YOU GOT MONEY TO BUY MORE.: NEVER TRUE

## 2023-11-28 ASSESSMENT — PATIENT HEALTH QUESTIONNAIRE - PHQ9
DEPRESSION UNABLE TO ASSESS: FUNCTIONAL CAPACITY MOTIVATION LIMITS ACCURACY
8. MOVING OR SPEAKING SO SLOWLY THAT OTHER PEOPLE COULD HAVE NOTICED. OR THE OPPOSITE, BEING SO FIGETY OR RESTLESS THAT YOU HAVE BEEN MOVING AROUND A LOT MORE THAN USUAL: 0
2. FEELING DOWN, DEPRESSED OR HOPELESS: 0
4. FEELING TIRED OR HAVING LITTLE ENERGY: 0
1. LITTLE INTEREST OR PLEASURE IN DOING THINGS: 0
SUM OF ALL RESPONSES TO PHQ QUESTIONS 1-9: 0
3. TROUBLE FALLING OR STAYING ASLEEP: 0
6. FEELING BAD ABOUT YOURSELF - OR THAT YOU ARE A FAILURE OR HAVE LET YOURSELF OR YOUR FAMILY DOWN: 0
SUM OF ALL RESPONSES TO PHQ QUESTIONS 1-9: 0
SUM OF ALL RESPONSES TO PHQ9 QUESTIONS 1 & 2: 0
9. THOUGHTS THAT YOU WOULD BE BETTER OFF DEAD, OR OF HURTING YOURSELF: 0
SUM OF ALL RESPONSES TO PHQ QUESTIONS 1-9: 0
10. IF YOU CHECKED OFF ANY PROBLEMS, HOW DIFFICULT HAVE THESE PROBLEMS MADE IT FOR YOU TO DO YOUR WORK, TAKE CARE OF THINGS AT HOME, OR GET ALONG WITH OTHER PEOPLE: 0
SUM OF ALL RESPONSES TO PHQ QUESTIONS 1-9: 0
5. POOR APPETITE OR OVEREATING: 0
7. TROUBLE CONCENTRATING ON THINGS, SUCH AS READING THE NEWSPAPER OR WATCHING TELEVISION: 0

## 2023-11-28 ASSESSMENT — ANXIETY QUESTIONNAIRES
2. NOT BEING ABLE TO STOP OR CONTROL WORRYING: 0
GAD7 TOTAL SCORE: 0
7. FEELING AFRAID AS IF SOMETHING AWFUL MIGHT HAPPEN: 0
IF YOU CHECKED OFF ANY PROBLEMS ON THIS QUESTIONNAIRE, HOW DIFFICULT HAVE THESE PROBLEMS MADE IT FOR YOU TO DO YOUR WORK, TAKE CARE OF THINGS AT HOME, OR GET ALONG WITH OTHER PEOPLE: NOT DIFFICULT AT ALL
5. BEING SO RESTLESS THAT IT IS HARD TO SIT STILL: 0
3. WORRYING TOO MUCH ABOUT DIFFERENT THINGS: 0
4. TROUBLE RELAXING: 0
6. BECOMING EASILY ANNOYED OR IRRITABLE: 0
1. FEELING NERVOUS, ANXIOUS, OR ON EDGE: 0

## 2023-11-28 ASSESSMENT — ENCOUNTER SYMPTOMS
ABDOMINAL PAIN: 0
BLOOD IN STOOL: 0
COLOR CHANGE: 1
NAUSEA: 0
CONSTIPATION: 1
SHORTNESS OF BREATH: 0
ANAL BLEEDING: 0

## 2023-11-28 ASSESSMENT — COLUMBIA-SUICIDE SEVERITY RATING SCALE - C-SSRS
7. DID THIS OCCUR IN THE LAST THREE MONTHS: NO
5. HAVE YOU STARTED TO WORK OUT OR WORKED OUT THE DETAILS OF HOW TO KILL YOURSELF? DO YOU INTEND TO CARRY OUT THIS PLAN?: NO
4. HAVE YOU HAD THESE THOUGHTS AND HAD SOME INTENTION OF ACTING ON THEM?: NO
3. HAVE YOU BEEN THINKING ABOUT HOW YOU MIGHT KILL YOURSELF?: NO

## 2023-11-29 ENCOUNTER — TELEPHONE (OUTPATIENT)
Dept: INTERNAL MEDICINE CLINIC | Facility: CLINIC | Age: 73
End: 2023-11-29

## 2023-11-29 NOTE — TELEPHONE ENCOUNTER
----- Message from Nidia Cisse, DO sent at 11/28/2023  6:56 PM EST -----  Please call patient and let her know that after further consideration I decided to modify what we discussed at her visit today. I will keep current prescription the same (clonazepam 0.5 mg tablets to take 1/2 tablet by mouth twice a day as needed given she is primarily taking only a 1/2 tablet at nighttime). She should have 1 refill left on her existing prescription that she can fill. After that I can send in for 90-day supply (she will just need to call 1 week prior to running out of her medication).

## 2023-12-07 DIAGNOSIS — G20.A1 PARKINSON'S DISEASE: ICD-10-CM

## 2023-12-07 NOTE — TELEPHONE ENCOUNTER
Pt. Is requesting a refill on Carbidopa-levodopa  MG. Please see pended Rx.  Refill request. Denise Daily

## 2024-01-09 DIAGNOSIS — F41.9 ANXIETY AND DEPRESSION: ICD-10-CM

## 2024-01-09 DIAGNOSIS — F32.A ANXIETY AND DEPRESSION: ICD-10-CM

## 2024-01-09 RX ORDER — CLONAZEPAM 0.5 MG/1
0.25 TABLET ORAL 2 TIMES DAILY PRN
Qty: 30 TABLET | Refills: 1 | Status: SHIPPED | OUTPATIENT
Start: 2024-01-09 | End: 2024-03-09

## 2024-01-09 NOTE — TELEPHONE ENCOUNTER
Patient requesting refill of clonazepam.  Last seen in office on 11/28/2023.  At that time patient stated that she was noticing improvement in her anxiety and depression.  Would typically take half a tablet of clonazepam at nighttime and sometimes a quarter of a tablet during the daytime.  Follow-up visit scheduled for 3/11/2024.  PDMP reviewed showing clonazepam 0.5 mg tablets, quantity #15, 15-day supply last filled on 12/6/2023.    Orders Placed This Encounter    clonazePAM (KLONOPIN) 0.5 MG tablet     Sig: Take 0.5 tablets by mouth 2 times daily as needed for Anxiety for up to 60 days. Do not drink alcohol, drive or operate heavy machinery after use as it may cause drowsiness. Max Daily Amount: 0.5 mg     Dispense:  30 tablet     Refill:  1

## 2024-01-09 NOTE — TELEPHONE ENCOUNTER
Pt requesting clonazepam refills, please.    Scripts last fill date: 12/6/23 for a 15 day supply.    Last OV: 11/28/23  Next OV: 3/11/24

## 2024-01-23 ENCOUNTER — OFFICE VISIT (OUTPATIENT)
Dept: NEUROLOGY | Age: 74
End: 2024-01-23
Payer: MEDICARE

## 2024-01-23 VITALS
SYSTOLIC BLOOD PRESSURE: 128 MMHG | OXYGEN SATURATION: 98 % | DIASTOLIC BLOOD PRESSURE: 84 MMHG | WEIGHT: 142 LBS | BODY MASS INDEX: 21.59 KG/M2 | HEART RATE: 88 BPM

## 2024-01-23 DIAGNOSIS — F41.9 ANXIETY AND DEPRESSION: ICD-10-CM

## 2024-01-23 DIAGNOSIS — G20.A1 PARKINSON'S DISEASE WITHOUT DYSKINESIA OR FLUCTUATING MANIFESTATIONS: Primary | ICD-10-CM

## 2024-01-23 DIAGNOSIS — Z86.73 HISTORY OF TIA (TRANSIENT ISCHEMIC ATTACK): ICD-10-CM

## 2024-01-23 DIAGNOSIS — R26.9 GAIT DISTURBANCE: ICD-10-CM

## 2024-01-23 DIAGNOSIS — F32.A ANXIETY AND DEPRESSION: ICD-10-CM

## 2024-01-23 PROCEDURE — G8420 CALC BMI NORM PARAMETERS: HCPCS | Performed by: PSYCHIATRY & NEUROLOGY

## 2024-01-23 PROCEDURE — 1036F TOBACCO NON-USER: CPT | Performed by: PSYCHIATRY & NEUROLOGY

## 2024-01-23 PROCEDURE — 3017F COLORECTAL CA SCREEN DOC REV: CPT | Performed by: PSYCHIATRY & NEUROLOGY

## 2024-01-23 PROCEDURE — 1123F ACP DISCUSS/DSCN MKR DOCD: CPT | Performed by: PSYCHIATRY & NEUROLOGY

## 2024-01-23 PROCEDURE — G8399 PT W/DXA RESULTS DOCUMENT: HCPCS | Performed by: PSYCHIATRY & NEUROLOGY

## 2024-01-23 PROCEDURE — 1090F PRES/ABSN URINE INCON ASSESS: CPT | Performed by: PSYCHIATRY & NEUROLOGY

## 2024-01-23 PROCEDURE — 99214 OFFICE O/P EST MOD 30 MIN: CPT | Performed by: PSYCHIATRY & NEUROLOGY

## 2024-01-23 PROCEDURE — G8427 DOCREV CUR MEDS BY ELIG CLIN: HCPCS | Performed by: PSYCHIATRY & NEUROLOGY

## 2024-01-23 PROCEDURE — G8484 FLU IMMUNIZE NO ADMIN: HCPCS | Performed by: PSYCHIATRY & NEUROLOGY

## 2024-01-23 ASSESSMENT — PATIENT HEALTH QUESTIONNAIRE - PHQ9
SUM OF ALL RESPONSES TO PHQ QUESTIONS 1-9: 1
SUM OF ALL RESPONSES TO PHQ9 QUESTIONS 1 & 2: 1
SUM OF ALL RESPONSES TO PHQ QUESTIONS 1-9: 1
SUM OF ALL RESPONSES TO PHQ QUESTIONS 1-9: 1
1. LITTLE INTEREST OR PLEASURE IN DOING THINGS: 0
SUM OF ALL RESPONSES TO PHQ QUESTIONS 1-9: 1
2. FEELING DOWN, DEPRESSED OR HOPELESS: 1

## 2024-01-23 ASSESSMENT — ENCOUNTER SYMPTOMS
COUGH: 0
CONSTIPATION: 0

## 2024-01-23 NOTE — PROGRESS NOTES
Arm, Position: Sitting)   Pulse 88   Wt 64.4 kg (142 lb)   SpO2 98%   BMI 21.59 kg/m²     General Exam:  General: Well developed, well nourished, in no apparent distress.  She is quite anxious.  HEENT: Normocephalic, atraumatic. Sclera anicteric. Oropharynx clear.   Neck: There is some subjective stiffness and limited range of motion particularly with head turning to the left.  Cardiovascular: Regular rate and rhythm. No carotid bruits.   Lungs: Non-labored breathing.   Abdomen: Soft, nontender, nondistended.   Extremities: Peripheral pulses intact. No edema and no rashes.      Neurological Exam:      MS/Language/Speech:  Alert. Oriented x 3. Language fluent. Speech normal.      Cranial Nerves: PERRL. Eye movements full. No nystagmus.facial expression was diminished.  Tongue and palate were midline. Shoulder shrug symmetric.     Motor: Strength was full in all proximal and distal muscle groups.  There is a mild increase in tone of the right upper extremity with some cogwheeling noted which enhances with contralateral activation.       Abnormal Movements: There is a fairly persistent resting tremor of the right upper extremity which augments with distraction. The tremor tends to attenuate with kinesis and is not very noticeable with finger-nose-finger. There is no tremor on the left. Rapid alternating movements show mild to moderate bradykinesia on the right and only subtle bradykinesia on the left.       Sensory: Normal to light touch throughout.     Cerebellar: No ataxia or dysmetria with finger-nose-finger bilaterally.      Reflexes (R/L): Biceps (3+/3+), Brachioradialis (3+/3+), Patellar (3+/3+).      Gait: She is able to rise from a seated position without difficulty.  Her posture is upright.  Romberg was normal. She walks with a relatively fluid gait with adequate stride length though there is diminished arm swing on the right with tremor noted. There is no freezing or hesitation.      Assessment and Plan:

## 2024-03-10 PROBLEM — F33.1 MDD (MAJOR DEPRESSIVE DISORDER), RECURRENT EPISODE, MODERATE (HCC): Status: RESOLVED | Noted: 2023-08-30 | Resolved: 2024-03-10

## 2024-03-10 NOTE — PROGRESS NOTES
Jessenia Prakash (:  1950) is a 74 y.o. female,Established patient, here for evaluation of the following chief complaint(s):  3 Month Follow-Up and Constipation (Has used colace, has gotten a little better)         ASSESSMENT/PLAN:  1. Parkinson's disease with dyskinesia, unspecified whether manifestations fluctuate  Syn-one testing from 2023 with pathologic evidence of phosphorylated alpha-synuclein deposition within the cutaneous nerves, decreased intraepidermal nerve fiber density consistent with a small fiber neuropathy  MRI of the brain without contrast on 3/16/2020 with teardrop shaped neural cyst or prominent perivascular space is demonstrated in the left centrum semiovale  Continue carbidopa/levodopa per neurology    2. Anxiety and depression  Intolerance to lexapro   Previously on prn alprazolam but switched by mental health specialist to prn clonazepam   Previously had appointment scheduled through [facility] behavioral health for outpatient counseling but per patient had difficulty scheduling.  Would be agreeable to seeing a specialist.  New referral to psychiatry placed at last visit  Controlled substance agreement signed previously  Continue as needed clonazepam    3. Palpitations  Echo from 3/16/2020 with EF of 55 to 60%, no right to left shunting, no valvulopathy or pericardial effusion  Check TSH  Continue metoprolol succinate    - TSH; Future    4. Intracranial atherosclerosis  CTA of the head and neck on 10/6/2022 with beadlike appearance of cervical left vertebral artery at the level of C1-2 (mild to moderate stenoses appear to be present in this area); small focus of encephalomalacia in the posterior left frontal lobe, unchanged from prior  Echocardiogram on 3/16/2020 with a EF of 55 to 60%, no evidence of right to left shunt with provocative testing  Continue low-dose daily aspirin.  Prior intolerance to rosuvastatin  Continue pravastatin  Neurology notes recommended

## 2024-03-11 ENCOUNTER — OFFICE VISIT (OUTPATIENT)
Dept: INTERNAL MEDICINE CLINIC | Facility: CLINIC | Age: 74
End: 2024-03-11
Payer: MEDICARE

## 2024-03-11 VITALS
BODY MASS INDEX: 21.97 KG/M2 | TEMPERATURE: 97.7 F | WEIGHT: 140 LBS | SYSTOLIC BLOOD PRESSURE: 124 MMHG | RESPIRATION RATE: 16 BRPM | DIASTOLIC BLOOD PRESSURE: 86 MMHG | HEART RATE: 80 BPM | HEIGHT: 67 IN

## 2024-03-11 DIAGNOSIS — G20.B1 PARKINSON'S DISEASE WITH DYSKINESIA, UNSPECIFIED WHETHER MANIFESTATIONS FLUCTUATE: Primary | ICD-10-CM

## 2024-03-11 DIAGNOSIS — F41.9 ANXIETY AND DEPRESSION: ICD-10-CM

## 2024-03-11 DIAGNOSIS — E78.89 ELEVATED HDL: ICD-10-CM

## 2024-03-11 DIAGNOSIS — I67.2 INTRACRANIAL ATHEROSCLEROSIS: ICD-10-CM

## 2024-03-11 DIAGNOSIS — M94.0 COSTOCHONDRITIS: ICD-10-CM

## 2024-03-11 DIAGNOSIS — F32.A ANXIETY AND DEPRESSION: ICD-10-CM

## 2024-03-11 DIAGNOSIS — R00.2 PALPITATIONS: ICD-10-CM

## 2024-03-11 PROCEDURE — G8399 PT W/DXA RESULTS DOCUMENT: HCPCS | Performed by: STUDENT IN AN ORGANIZED HEALTH CARE EDUCATION/TRAINING PROGRAM

## 2024-03-11 PROCEDURE — G8484 FLU IMMUNIZE NO ADMIN: HCPCS | Performed by: STUDENT IN AN ORGANIZED HEALTH CARE EDUCATION/TRAINING PROGRAM

## 2024-03-11 PROCEDURE — G8420 CALC BMI NORM PARAMETERS: HCPCS | Performed by: STUDENT IN AN ORGANIZED HEALTH CARE EDUCATION/TRAINING PROGRAM

## 2024-03-11 PROCEDURE — 1090F PRES/ABSN URINE INCON ASSESS: CPT | Performed by: STUDENT IN AN ORGANIZED HEALTH CARE EDUCATION/TRAINING PROGRAM

## 2024-03-11 PROCEDURE — 99214 OFFICE O/P EST MOD 30 MIN: CPT | Performed by: STUDENT IN AN ORGANIZED HEALTH CARE EDUCATION/TRAINING PROGRAM

## 2024-03-11 PROCEDURE — G8427 DOCREV CUR MEDS BY ELIG CLIN: HCPCS | Performed by: STUDENT IN AN ORGANIZED HEALTH CARE EDUCATION/TRAINING PROGRAM

## 2024-03-11 PROCEDURE — 1036F TOBACCO NON-USER: CPT | Performed by: STUDENT IN AN ORGANIZED HEALTH CARE EDUCATION/TRAINING PROGRAM

## 2024-03-11 PROCEDURE — 1123F ACP DISCUSS/DSCN MKR DOCD: CPT | Performed by: STUDENT IN AN ORGANIZED HEALTH CARE EDUCATION/TRAINING PROGRAM

## 2024-03-11 PROCEDURE — 3017F COLORECTAL CA SCREEN DOC REV: CPT | Performed by: STUDENT IN AN ORGANIZED HEALTH CARE EDUCATION/TRAINING PROGRAM

## 2024-03-11 ASSESSMENT — ENCOUNTER SYMPTOMS
BLOOD IN STOOL: 0
VOMITING: 0
TROUBLE SWALLOWING: 0
ANAL BLEEDING: 0
CONSTIPATION: 1
NAUSEA: 0
ABDOMINAL PAIN: 1
SHORTNESS OF BREATH: 0

## 2024-04-19 ENCOUNTER — TELEPHONE (OUTPATIENT)
Dept: INTERNAL MEDICINE CLINIC | Facility: CLINIC | Age: 74
End: 2024-04-19

## 2024-04-19 DIAGNOSIS — I67.2 INTRACRANIAL ATHEROSCLEROSIS: ICD-10-CM

## 2024-04-19 RX ORDER — PRAVASTATIN SODIUM 20 MG
20 TABLET ORAL NIGHTLY
Qty: 90 TABLET | Refills: 1 | OUTPATIENT
Start: 2024-04-19

## 2024-04-19 RX ORDER — PRAVASTATIN SODIUM 20 MG
20 TABLET ORAL NIGHTLY
Qty: 90 TABLET | Refills: 1 | Status: CANCELLED | OUTPATIENT
Start: 2024-04-19

## 2024-04-19 NOTE — TELEPHONE ENCOUNTER
Rx request sent to provider. Please inform patient that all medication refills requested by the pharmacy will be automatically denied due to the number of patients in the office that can not advocate for themselves at the pharmacy. In order to receive refills patients must request them themselves either through a call or mychart.

## 2024-04-24 ENCOUNTER — OFFICE VISIT (OUTPATIENT)
Dept: NEUROLOGY | Age: 74
End: 2024-04-24
Payer: MEDICARE

## 2024-04-24 VITALS
HEIGHT: 67 IN | OXYGEN SATURATION: 98 % | HEART RATE: 85 BPM | BODY MASS INDEX: 21.88 KG/M2 | DIASTOLIC BLOOD PRESSURE: 67 MMHG | SYSTOLIC BLOOD PRESSURE: 108 MMHG | WEIGHT: 139.4 LBS

## 2024-04-24 DIAGNOSIS — I67.2 INTRACRANIAL ATHEROSCLEROSIS: ICD-10-CM

## 2024-04-24 DIAGNOSIS — R26.9 GAIT DISTURBANCE: ICD-10-CM

## 2024-04-24 DIAGNOSIS — F41.9 ANXIETY AND DEPRESSION: ICD-10-CM

## 2024-04-24 DIAGNOSIS — Z86.73 HISTORY OF TIA (TRANSIENT ISCHEMIC ATTACK): ICD-10-CM

## 2024-04-24 DIAGNOSIS — F32.A ANXIETY AND DEPRESSION: ICD-10-CM

## 2024-04-24 DIAGNOSIS — G47.33 OSA (OBSTRUCTIVE SLEEP APNEA): ICD-10-CM

## 2024-04-24 DIAGNOSIS — G20.A2 PARKINSON'S DISEASE WITHOUT DYSKINESIA, WITH FLUCTUATING MANIFESTATIONS (HCC): Primary | ICD-10-CM

## 2024-04-24 PROCEDURE — 1090F PRES/ABSN URINE INCON ASSESS: CPT

## 2024-04-24 PROCEDURE — 99215 OFFICE O/P EST HI 40 MIN: CPT

## 2024-04-24 PROCEDURE — G8399 PT W/DXA RESULTS DOCUMENT: HCPCS

## 2024-04-24 PROCEDURE — G8427 DOCREV CUR MEDS BY ELIG CLIN: HCPCS

## 2024-04-24 PROCEDURE — 1123F ACP DISCUSS/DSCN MKR DOCD: CPT

## 2024-04-24 PROCEDURE — 3017F COLORECTAL CA SCREEN DOC REV: CPT

## 2024-04-24 PROCEDURE — 1036F TOBACCO NON-USER: CPT

## 2024-04-24 PROCEDURE — G8420 CALC BMI NORM PARAMETERS: HCPCS

## 2024-04-24 RX ORDER — CLONAZEPAM 0.5 MG/1
0.25 TABLET ORAL 2 TIMES DAILY PRN
Qty: 30 TABLET | Refills: 1 | Status: SHIPPED | OUTPATIENT
Start: 2024-04-24 | End: 2024-06-23

## 2024-04-24 RX ORDER — PRAVASTATIN SODIUM 20 MG
20 TABLET ORAL NIGHTLY
Qty: 90 TABLET | Refills: 1 | Status: SHIPPED | OUTPATIENT
Start: 2024-04-24

## 2024-04-24 ASSESSMENT — PATIENT HEALTH QUESTIONNAIRE - PHQ9
9. THOUGHTS THAT YOU WOULD BE BETTER OFF DEAD, OR OF HURTING YOURSELF: MORE THAN HALF THE DAYS
6. FEELING BAD ABOUT YOURSELF - OR THAT YOU ARE A FAILURE OR HAVE LET YOURSELF OR YOUR FAMILY DOWN: MORE THAN HALF THE DAYS
5. POOR APPETITE OR OVEREATING: MORE THAN HALF THE DAYS
1. LITTLE INTEREST OR PLEASURE IN DOING THINGS: NEARLY EVERY DAY
4. FEELING TIRED OR HAVING LITTLE ENERGY: NEARLY EVERY DAY
SUM OF ALL RESPONSES TO PHQ QUESTIONS 1-9: 18
2. FEELING DOWN, DEPRESSED OR HOPELESS: NEARLY EVERY DAY
SUM OF ALL RESPONSES TO PHQ QUESTIONS 1-9: 18
SUM OF ALL RESPONSES TO PHQ QUESTIONS 1-9: 16
SUM OF ALL RESPONSES TO PHQ9 QUESTIONS 1 & 2: 6
10. IF YOU CHECKED OFF ANY PROBLEMS, HOW DIFFICULT HAVE THESE PROBLEMS MADE IT FOR YOU TO DO YOUR WORK, TAKE CARE OF THINGS AT HOME, OR GET ALONG WITH OTHER PEOPLE: EXTREMELY DIFFICULT
SUM OF ALL RESPONSES TO PHQ QUESTIONS 1-9: 18
3. TROUBLE FALLING OR STAYING ASLEEP: NOT AT ALL
8. MOVING OR SPEAKING SO SLOWLY THAT OTHER PEOPLE COULD HAVE NOTICED. OR THE OPPOSITE, BEING SO FIGETY OR RESTLESS THAT YOU HAVE BEEN MOVING AROUND A LOT MORE THAN USUAL: MORE THAN HALF THE DAYS
7. TROUBLE CONCENTRATING ON THINGS, SUCH AS READING THE NEWSPAPER OR WATCHING TELEVISION: SEVERAL DAYS

## 2024-04-24 ASSESSMENT — COLUMBIA-SUICIDE SEVERITY RATING SCALE - C-SSRS
6. HAVE YOU EVER DONE ANYTHING, STARTED TO DO ANYTHING, OR PREPARED TO DO ANYTHING TO END YOUR LIFE?: NO
4. HAVE YOU HAD THESE THOUGHTS AND HAD SOME INTENTION OF ACTING ON THEM?: NO
5. HAVE YOU STARTED TO WORK OUT OR WORKED OUT THE DETAILS OF HOW TO KILL YOURSELF? DO YOU INTEND TO CARRY OUT THIS PLAN?: NO
1. WITHIN THE PAST MONTH, HAVE YOU WISHED YOU WERE DEAD OR WISHED YOU COULD GO TO SLEEP AND NOT WAKE UP?: YES
2. HAVE YOU ACTUALLY HAD ANY THOUGHTS OF KILLING YOURSELF?: YES
3. HAVE YOU BEEN THINKING ABOUT HOW YOU MIGHT KILL YOURSELF?: NO

## 2024-04-24 NOTE — PROGRESS NOTES
[facility]  [address], [address]  [place] [address]  Phone: [phone] Fax [phone]  Ana Monk NP-C        Patient: Jessenia Prakash  Provider: ANDRY Frederick    CC:   Chief Complaint   Patient presents with    Follow-up     Pt mentions concern of having anxiety, fatigue        Referring Provider: Mitchell Barkley MD  PCP: Jermaine Barkley DO    History of Present Illness:     Interval History on 5/22/2024:  Jessenia Prakash is a 74 y.o. female with PMH as below, who presents for follow-up of probable Parkinson's disease.     Today, the patient arrived to the office ambulating independently and is unaccompanied for her visit. Denies any recent falls.    Since last office visit, the patient reports:  -Continues to take Sinemet  mg 1 tablet twice daily (7AM and 3PM)  -Denies suicidal/homicidal ideation.  -Anxiety and depression not well managed  -Referral previously entered by PCP; patient did not follow through  -Will reenter multiple referrals- psychiatry x2 and care coordination as finances seems to be a barrier to care  -Has verbally agreed to contact PCP to schedule appointment to further discuss  -Has verbally agreed to go the the emergency room should her anxiety/depression symptoms worsen  -Anxiety and depression likely contributing to worsening tremor    -On average, sleeping 7-8 hours per night. Generally does not wake up feeling rested.  -Driving: no    Relevant Medications as of 5/22/2024:   Current Relevant Medications:   Sinemet  mg 1 tablet in the morning and 1 tablet in the afternoon (7am and 3pm)  Clonazepam 0.25 mg twice a day as needed for anxiety    Previous Medication Trials:  Lexapro    Original HPI / Prior Notes:  January 2024:  Jessenia Prakash is a 73 y.o. RH female who presents for follow-up of tremor and probable Parkinson's disease.      She is unaccompanied for today's visit.  She was last seen by Nishi Monk NP in

## 2024-04-24 NOTE — TELEPHONE ENCOUNTER
Patient requesting medication refills including her clonazepam.  Last seen in office on 3/11/2024.  Controlled substance agreement previously signed.  Follow-up visit scheduled for 8/12/2024. PDMP reviewed showing clonazepam 0.5 mg tablets, quantity #30, 30-day supply last filled on 3/7/2024.  New prescription sent to pharmacy on record.    Orders Placed This Encounter    clonazePAM (KLONOPIN) 0.5 MG tablet     Sig: Take 0.5 tablets by mouth 2 times daily as needed for Anxiety for up to 60 days. Max of 1 tablet per twenty-four hours. Do not drink alcohol, drive or operate heavy machinery after use as it may cause drowsiness. Max Daily Amount: 0.5 mg     Dispense:  30 tablet     Refill:  1    pravastatin (PRAVACHOL) 20 MG tablet     Sig: Take 1 tablet by mouth at bedtime     Dispense:  90 tablet     Refill:  1

## 2024-05-22 ENCOUNTER — CARE COORDINATION (OUTPATIENT)
Dept: CARE COORDINATION | Facility: CLINIC | Age: 74
End: 2024-05-22

## 2024-05-22 NOTE — CARE COORDINATION
AMY OLSON attempted outreach regarding referral from Neuro.  No answer-VM full.  Will try again later this week.

## 2024-05-23 ENCOUNTER — CARE COORDINATION (OUTPATIENT)
Dept: CARE COORDINATION | Facility: CLINIC | Age: 74
End: 2024-05-23

## 2024-06-03 ENCOUNTER — CARE COORDINATION (OUTPATIENT)
Dept: CARE COORDINATION | Facility: CLINIC | Age: 74
End: 2024-06-03

## 2024-06-03 NOTE — CARE COORDINATION
AMY CM able to leave VM on this date.  VM has been full during previous attempts.  Awaiting response.

## 2024-06-04 ENCOUNTER — CARE COORDINATION (OUTPATIENT)
Dept: CARE COORDINATION | Facility: CLINIC | Age: 74
End: 2024-06-04

## 2024-06-04 NOTE — CARE COORDINATION
Final attempt to reach pt.  VM full again.  AMY CM removed from care team for now.  Can rejoin in the future if indicated.

## 2024-06-15 DIAGNOSIS — R00.2 PALPITATIONS: ICD-10-CM

## 2024-06-17 RX ORDER — METOPROLOL SUCCINATE 25 MG/1
TABLET, EXTENDED RELEASE ORAL
Qty: 135 TABLET | Refills: 1 | OUTPATIENT
Start: 2024-06-17

## 2024-07-17 DIAGNOSIS — G20.A1 PARKINSON'S DISEASE WITHOUT DYSKINESIA OR FLUCTUATING MANIFESTATIONS (HCC): ICD-10-CM

## 2024-08-02 ENCOUNTER — OFFICE VISIT (OUTPATIENT)
Dept: NEUROLOGY | Age: 74
End: 2024-08-02
Payer: MEDICARE

## 2024-08-02 VITALS
DIASTOLIC BLOOD PRESSURE: 74 MMHG | WEIGHT: 132 LBS | BODY MASS INDEX: 20.67 KG/M2 | HEART RATE: 74 BPM | OXYGEN SATURATION: 98 % | SYSTOLIC BLOOD PRESSURE: 114 MMHG

## 2024-08-02 DIAGNOSIS — F32.A ANXIETY AND DEPRESSION: ICD-10-CM

## 2024-08-02 DIAGNOSIS — R26.9 GAIT DISTURBANCE: Primary | ICD-10-CM

## 2024-08-02 DIAGNOSIS — G20.A1 PARKINSON'S DISEASE WITHOUT DYSKINESIA OR FLUCTUATING MANIFESTATIONS (HCC): ICD-10-CM

## 2024-08-02 DIAGNOSIS — Z86.73 HISTORY OF TIA (TRANSIENT ISCHEMIC ATTACK): ICD-10-CM

## 2024-08-02 DIAGNOSIS — F41.9 ANXIETY AND DEPRESSION: ICD-10-CM

## 2024-08-02 PROCEDURE — 99214 OFFICE O/P EST MOD 30 MIN: CPT | Performed by: PSYCHIATRY & NEUROLOGY

## 2024-08-02 PROCEDURE — 1123F ACP DISCUSS/DSCN MKR DOCD: CPT | Performed by: PSYCHIATRY & NEUROLOGY

## 2024-08-02 PROCEDURE — G8427 DOCREV CUR MEDS BY ELIG CLIN: HCPCS | Performed by: PSYCHIATRY & NEUROLOGY

## 2024-08-02 PROCEDURE — 1090F PRES/ABSN URINE INCON ASSESS: CPT | Performed by: PSYCHIATRY & NEUROLOGY

## 2024-08-02 PROCEDURE — G8399 PT W/DXA RESULTS DOCUMENT: HCPCS | Performed by: PSYCHIATRY & NEUROLOGY

## 2024-08-02 PROCEDURE — 3017F COLORECTAL CA SCREEN DOC REV: CPT | Performed by: PSYCHIATRY & NEUROLOGY

## 2024-08-02 PROCEDURE — G8420 CALC BMI NORM PARAMETERS: HCPCS | Performed by: PSYCHIATRY & NEUROLOGY

## 2024-08-02 PROCEDURE — 1036F TOBACCO NON-USER: CPT | Performed by: PSYCHIATRY & NEUROLOGY

## 2024-08-02 ASSESSMENT — ENCOUNTER SYMPTOMS
CONSTIPATION: 0
COUGH: 0

## 2024-08-02 ASSESSMENT — PATIENT HEALTH QUESTIONNAIRE - PHQ9
SUM OF ALL RESPONSES TO PHQ QUESTIONS 1-9: 2
DEPRESSION UNABLE TO ASSESS: FUNCTIONAL CAPACITY MOTIVATION LIMITS ACCURACY
2. FEELING DOWN, DEPRESSED OR HOPELESS: SEVERAL DAYS
SUM OF ALL RESPONSES TO PHQ QUESTIONS 1-9: 2
1. LITTLE INTEREST OR PLEASURE IN DOING THINGS: SEVERAL DAYS
SUM OF ALL RESPONSES TO PHQ9 QUESTIONS 1 & 2: 2

## 2024-08-02 NOTE — PROGRESS NOTES
[facility] NEUROLOGY  [address], [address]  [place] [address]  Phone: [phone] Fax [phone]  Mitchell Barkley MD      Patient: Jessenia Prakash  Provider: Mitchell Barkley MD    CC:   Chief Complaint   Patient presents with    Follow-up     Referring Provider:    History of Present Illness:     Jessenia Prakash is a 74 y.o. RH female who presents for follow-up of tremor and probable Parkinson's disease.     She is unaccompanied for today's visit.  She was last seen by Nishi Monk NP in April 2024.       Patient presents for follow-up and continued management of a resting tremor of the right upper extremity, most likely felt to be idiopathic Parkinson's disease.  She had a skin biopsy (Syn-One) which showed evidence of alpha-synuclein deposition within the cutaneous nerves which supports the diagnosis.    Current medications include (but not limited to):   Sinemet  mg 1 tablet in the morning and 1/2 tablet in the afternoon  Clonazepam 0.25 mg twice a day as needed for anxiety    Previous medication trials include: Lexapro    Patient presents today for follow-up.    No significant changes since the last visit.  She continues to struggle with chronic generalized anxiety.  Attempts to refer her to psychiatry have been met with challenges in getting it scheduled.  It appears patient has continued to put off scheduling despite several referrals, citing several reasons including her desire for \"virtual\" appointments and other insurance reasons.      Recall she had a previous inpatient hospitalization for suicidal ideation in the setting of anxiety.  She does continue to take some clonazepam as needed with some benefit but has been intolerant of other SSRIs including Lexapro in the past.    She continues to take Sinemet as noted above and does appear to be tolerating it well.  She does note some perception of motor fluctuations with possible wearing off in the afternoon.  She feels her anxiety and

## 2024-08-06 ENCOUNTER — LAB (OUTPATIENT)
Dept: INTERNAL MEDICINE CLINIC | Facility: CLINIC | Age: 74
End: 2024-08-06

## 2024-08-06 DIAGNOSIS — I67.2 INTRACRANIAL ATHEROSCLEROSIS: ICD-10-CM

## 2024-08-06 DIAGNOSIS — R00.2 PALPITATIONS: ICD-10-CM

## 2024-08-06 LAB
ALBUMIN SERPL-MCNC: 4 G/DL (ref 3.2–4.6)
ALBUMIN/GLOB SERPL: 1.5 (ref 1–1.9)
ALP SERPL-CCNC: 67 U/L (ref 35–104)
ALT SERPL-CCNC: <5 U/L (ref 12–65)
ANION GAP SERPL CALC-SCNC: 11 MMOL/L (ref 9–18)
AST SERPL-CCNC: 28 U/L (ref 15–37)
BASOPHILS # BLD: 0.1 K/UL (ref 0–0.2)
BASOPHILS NFR BLD: 1 % (ref 0–2)
BILIRUB SERPL-MCNC: 0.9 MG/DL (ref 0–1.2)
BUN SERPL-MCNC: 12 MG/DL (ref 8–23)
CALCIUM SERPL-MCNC: 9.6 MG/DL (ref 8.8–10.2)
CHLORIDE SERPL-SCNC: 100 MMOL/L (ref 98–107)
CHOLEST SERPL-MCNC: 145 MG/DL (ref 0–200)
CO2 SERPL-SCNC: 25 MMOL/L (ref 20–28)
CREAT SERPL-MCNC: 0.86 MG/DL (ref 0.6–1.1)
DIFFERENTIAL METHOD BLD: NORMAL
EOSINOPHIL # BLD: 0.1 K/UL (ref 0–0.8)
EOSINOPHIL NFR BLD: 2 % (ref 0.5–7.8)
ERYTHROCYTE [DISTWIDTH] IN BLOOD BY AUTOMATED COUNT: 12.3 % (ref 11.9–14.6)
GLOBULIN SER CALC-MCNC: 2.8 G/DL (ref 2.3–3.5)
GLUCOSE SERPL-MCNC: 98 MG/DL (ref 70–99)
HCT VFR BLD AUTO: 40.3 % (ref 35.8–46.3)
HDLC SERPL-MCNC: 62 MG/DL (ref 40–60)
HDLC SERPL: 2.3 (ref 0–5)
HGB BLD-MCNC: 13.6 G/DL (ref 11.7–15.4)
IMM GRANULOCYTES # BLD AUTO: 0 K/UL (ref 0–0.5)
IMM GRANULOCYTES NFR BLD AUTO: 0 % (ref 0–5)
LDLC SERPL CALC-MCNC: 69 MG/DL (ref 0–100)
LYMPHOCYTES # BLD: 0.9 K/UL (ref 0.5–4.6)
LYMPHOCYTES NFR BLD: 19 % (ref 13–44)
MCH RBC QN AUTO: 30.2 PG (ref 26.1–32.9)
MCHC RBC AUTO-ENTMCNC: 33.7 G/DL (ref 31.4–35)
MCV RBC AUTO: 89.4 FL (ref 82–102)
MONOCYTES # BLD: 0.4 K/UL (ref 0.1–1.3)
MONOCYTES NFR BLD: 8 % (ref 4–12)
NEUTS SEG # BLD: 3.2 K/UL (ref 1.7–8.2)
NEUTS SEG NFR BLD: 70 % (ref 43–78)
NRBC # BLD: 0 K/UL (ref 0–0.2)
PLATELET # BLD AUTO: 251 K/UL (ref 150–450)
PMV BLD AUTO: 10.7 FL (ref 9.4–12.3)
POTASSIUM SERPL-SCNC: 3.9 MMOL/L (ref 3.5–5.1)
PROT SERPL-MCNC: 6.8 G/DL (ref 6.3–8.2)
RBC # BLD AUTO: 4.51 M/UL (ref 4.05–5.2)
SODIUM SERPL-SCNC: 135 MMOL/L (ref 136–145)
TRIGL SERPL-MCNC: 70 MG/DL (ref 0–150)
TSH, 3RD GENERATION: 1.44 UIU/ML (ref 0.27–4.2)
VLDLC SERPL CALC-MCNC: 14 MG/DL (ref 6–23)
WBC # BLD AUTO: 4.6 K/UL (ref 4.3–11.1)

## 2024-08-11 SDOH — HEALTH STABILITY: PHYSICAL HEALTH: ON AVERAGE, HOW MANY DAYS PER WEEK DO YOU ENGAGE IN MODERATE TO STRENUOUS EXERCISE (LIKE A BRISK WALK)?: 2 DAYS

## 2024-08-11 SDOH — HEALTH STABILITY: PHYSICAL HEALTH: ON AVERAGE, HOW MANY MINUTES DO YOU ENGAGE IN EXERCISE AT THIS LEVEL?: 10 MIN

## 2024-08-11 ASSESSMENT — PATIENT HEALTH QUESTIONNAIRE - PHQ9
SUM OF ALL RESPONSES TO PHQ9 QUESTIONS 1 & 2: 2
SUM OF ALL RESPONSES TO PHQ QUESTIONS 1-9: 2
1. LITTLE INTEREST OR PLEASURE IN DOING THINGS: SEVERAL DAYS
2. FEELING DOWN, DEPRESSED OR HOPELESS: SEVERAL DAYS
SUM OF ALL RESPONSES TO PHQ QUESTIONS 1-9: 2

## 2024-08-11 ASSESSMENT — LIFESTYLE VARIABLES
HOW OFTEN DO YOU HAVE A DRINK CONTAINING ALCOHOL: 1
HOW OFTEN DO YOU HAVE SIX OR MORE DRINKS ON ONE OCCASION: 1
HOW MANY STANDARD DRINKS CONTAINING ALCOHOL DO YOU HAVE ON A TYPICAL DAY: 0
HOW OFTEN DO YOU HAVE A DRINK CONTAINING ALCOHOL: NEVER
HOW MANY STANDARD DRINKS CONTAINING ALCOHOL DO YOU HAVE ON A TYPICAL DAY: PATIENT DOES NOT DRINK

## 2024-08-11 NOTE — ASSESSMENT & PLAN NOTE
Echo from 3/16/2020 with EF of 55 to 60%, no right to left shunting, no valvulopathy or pericardial effusion   TSH and potassium within normal limits on 8/6/2024  Continue metoprolol succinate    Orders:    metoprolol succinate (TOPROL XL) 25 MG extended release tablet; Take 1.5 tablets by mouth daily

## 2024-08-11 NOTE — ASSESSMENT & PLAN NOTE
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  Unclear if increased anxiety is associated with timeframe at which the frequency of medication administration occurred or whether it is just worsening of baseline anxiety.  I will reach out to neurology to discuss further

## 2024-08-11 NOTE — PROGRESS NOTES
in all 4 extremities  Resting tremor of both hands right more than left   Psychiatric:         Attention and Perception: Attention normal.         Mood and Affect: Affect normal. Mood is anxious. Affect is not tearful.         Speech: Speech normal. Speech is not rapid and pressured or slurred.         Behavior: Behavior normal. Behavior is not agitated, aggressive or combative. Behavior is cooperative.         Thought Content: Thought content normal. Thought content does not include homicidal or suicidal plan.                  An electronic signature was used to authenticate this note.    --Jermaine Barkley, DO       Medicare Annual Wellness Visit    Jessenia Prakash is here for Medicare AWV (Pt is here for her yearly AWV and lab review. )    Assessment & Plan   Medicare annual wellness visit, subsequent  Parkinson's disease without dyskinesia, unspecified whether manifestations fluctuate (HCC)  Assessment & Plan:  Syn-one testing from June 2023 with pathologic evidence of phosphorylated alpha-synuclein deposition within the cutaneous nerves, decreased intraepidermal nerve fiber density consistent with a small fiber neuropathy  MRI of the brain without contrast on 3/16/2020 with teardrop shaped neural cyst or prominent perivascular space is demonstrated in the left centrum semiovale  Continue carbidopa/levodopa per neurology, recently increased to 3 times a day dosing.  Unclear if increased anxiety is associated with timeframe at which the frequency of medication administration occurred or whether it is just worsening of baseline anxiety.  I will reach out to neurology to discuss further         Anxiety and depression  -     [facility] - Florin Goode MD, Psychiatry, [place]  Palpitations  Assessment & Plan:  Echo from 3/16/2020 with EF of 55 to 60%, no right to left shunting, no valvulopathy or pericardial effusion   TSH and potassium within normal limits on 8/6/2024  Continue metoprolol succinate

## 2024-08-12 ENCOUNTER — OFFICE VISIT (OUTPATIENT)
Dept: INTERNAL MEDICINE CLINIC | Facility: CLINIC | Age: 74
End: 2024-08-12
Payer: MEDICARE

## 2024-08-12 VITALS
SYSTOLIC BLOOD PRESSURE: 110 MMHG | TEMPERATURE: 97.1 F | OXYGEN SATURATION: 98 % | HEART RATE: 95 BPM | DIASTOLIC BLOOD PRESSURE: 62 MMHG | BODY MASS INDEX: 20.72 KG/M2 | WEIGHT: 132 LBS | HEIGHT: 67 IN

## 2024-08-12 DIAGNOSIS — G20.A1 PARKINSON'S DISEASE WITHOUT DYSKINESIA, UNSPECIFIED WHETHER MANIFESTATIONS FLUCTUATE (HCC): ICD-10-CM

## 2024-08-12 DIAGNOSIS — R00.2 PALPITATIONS: ICD-10-CM

## 2024-08-12 DIAGNOSIS — F41.9 ANXIETY AND DEPRESSION: ICD-10-CM

## 2024-08-12 DIAGNOSIS — F32.A ANXIETY AND DEPRESSION: ICD-10-CM

## 2024-08-12 DIAGNOSIS — I67.2 INTRACRANIAL ATHEROSCLEROSIS: ICD-10-CM

## 2024-08-12 DIAGNOSIS — Z00.00 MEDICARE ANNUAL WELLNESS VISIT, SUBSEQUENT: Primary | ICD-10-CM

## 2024-08-12 PROCEDURE — 1123F ACP DISCUSS/DSCN MKR DOCD: CPT | Performed by: STUDENT IN AN ORGANIZED HEALTH CARE EDUCATION/TRAINING PROGRAM

## 2024-08-12 PROCEDURE — G0439 PPPS, SUBSEQ VISIT: HCPCS | Performed by: STUDENT IN AN ORGANIZED HEALTH CARE EDUCATION/TRAINING PROGRAM

## 2024-08-12 PROCEDURE — 1090F PRES/ABSN URINE INCON ASSESS: CPT | Performed by: STUDENT IN AN ORGANIZED HEALTH CARE EDUCATION/TRAINING PROGRAM

## 2024-08-12 PROCEDURE — 99213 OFFICE O/P EST LOW 20 MIN: CPT | Performed by: STUDENT IN AN ORGANIZED HEALTH CARE EDUCATION/TRAINING PROGRAM

## 2024-08-12 PROCEDURE — G8420 CALC BMI NORM PARAMETERS: HCPCS | Performed by: STUDENT IN AN ORGANIZED HEALTH CARE EDUCATION/TRAINING PROGRAM

## 2024-08-12 PROCEDURE — 1036F TOBACCO NON-USER: CPT | Performed by: STUDENT IN AN ORGANIZED HEALTH CARE EDUCATION/TRAINING PROGRAM

## 2024-08-12 PROCEDURE — 3017F COLORECTAL CA SCREEN DOC REV: CPT | Performed by: STUDENT IN AN ORGANIZED HEALTH CARE EDUCATION/TRAINING PROGRAM

## 2024-08-12 PROCEDURE — G8427 DOCREV CUR MEDS BY ELIG CLIN: HCPCS | Performed by: STUDENT IN AN ORGANIZED HEALTH CARE EDUCATION/TRAINING PROGRAM

## 2024-08-12 PROCEDURE — G8399 PT W/DXA RESULTS DOCUMENT: HCPCS | Performed by: STUDENT IN AN ORGANIZED HEALTH CARE EDUCATION/TRAINING PROGRAM

## 2024-08-12 RX ORDER — PRAVASTATIN SODIUM 20 MG
20 TABLET ORAL NIGHTLY
Qty: 90 TABLET | Refills: 2 | Status: SHIPPED | OUTPATIENT
Start: 2024-08-12

## 2024-08-12 RX ORDER — METOPROLOL SUCCINATE 25 MG/1
TABLET, EXTENDED RELEASE ORAL
Qty: 135 TABLET | Refills: 2 | Status: SHIPPED | OUTPATIENT
Start: 2024-08-12

## 2024-08-12 ASSESSMENT — ENCOUNTER SYMPTOMS
BLOOD IN STOOL: 0
DIARRHEA: 0
VOMITING: 0
SHORTNESS OF BREATH: 0
CONSTIPATION: 1
ANAL BLEEDING: 0

## 2024-08-12 NOTE — PATIENT INSTRUCTIONS
a substance called plaque builds up in the vessels that supply oxygen-rich blood to your heart muscle. This can narrow the blood vessels and reduce blood flow. A heart attack happens when blood flow is completely blocked. A high-fat diet, smoking, and other factors increase the risk of heart disease.  Your doctor has found that you have a chance of having heart disease. A heart-healthy lifestyle can help keep your heart healthy and prevent heart disease. This lifestyle includes eating healthy, being active, staying at a weight that's healthy for you, and not smoking or using tobacco. It also includes taking medicines as directed, managing other health conditions, and trying to get a healthy amount of sleep.  Follow-up care is a key part of your treatment and safety. Be sure to make and go to all appointments, and call your doctor if you are having problems. It's also a good idea to know your test results and keep a list of the medicines you take.  How can you care for yourself at home?  Diet    Use less salt when you cook and eat. This helps lower your blood pressure. Taste food before salting. Add only a little salt when you think you need it. With time, your taste buds will adjust to less salt.     Eat fewer snack items, fast foods, canned soups, and other high-salt, high-fat, processed foods.     Read food labels and try to avoid saturated and trans fats. They increase your risk of heart disease by raising cholesterol levels.     Limit the amount of solid fat--butter, margarine, and shortening--you eat. Use olive, peanut, or canola oil when you cook. Bake, broil, and steam foods instead of frying them.     Eat a variety of fruit and vegetables every day. Dark green, deep orange, red, or yellow fruits and vegetables are especially good for you. Examples include spinach, carrots, peaches, and berries.     Foods high in fiber can reduce your cholesterol and provide important vitamins and minerals. High-fiber foods

## 2024-08-13 RX ORDER — ALPRAZOLAM 0.25 MG/1
TABLET ORAL
Qty: 60 TABLET | Refills: 1 | Status: SHIPPED | OUTPATIENT
Start: 2024-08-13 | End: 2025-02-13

## 2024-08-20 ENCOUNTER — OFFICE VISIT (OUTPATIENT)
Dept: VASCULAR SURGERY | Age: 74
End: 2024-08-20
Payer: MEDICARE

## 2024-08-20 VITALS
WEIGHT: 129 LBS | DIASTOLIC BLOOD PRESSURE: 80 MMHG | SYSTOLIC BLOOD PRESSURE: 128 MMHG | HEIGHT: 67 IN | HEART RATE: 79 BPM | BODY MASS INDEX: 20.25 KG/M2 | OXYGEN SATURATION: 98 %

## 2024-08-20 DIAGNOSIS — I73.9 PVD (PERIPHERAL VASCULAR DISEASE) WITH CLAUDICATION (HCC): Primary | ICD-10-CM

## 2024-08-20 PROCEDURE — 1036F TOBACCO NON-USER: CPT | Performed by: SURGERY

## 2024-08-20 PROCEDURE — 1090F PRES/ABSN URINE INCON ASSESS: CPT | Performed by: SURGERY

## 2024-08-20 PROCEDURE — G8399 PT W/DXA RESULTS DOCUMENT: HCPCS | Performed by: SURGERY

## 2024-08-20 PROCEDURE — 1123F ACP DISCUSS/DSCN MKR DOCD: CPT | Performed by: SURGERY

## 2024-08-20 PROCEDURE — 99204 OFFICE O/P NEW MOD 45 MIN: CPT | Performed by: SURGERY

## 2024-08-20 PROCEDURE — G8427 DOCREV CUR MEDS BY ELIG CLIN: HCPCS | Performed by: SURGERY

## 2024-08-20 PROCEDURE — 3017F COLORECTAL CA SCREEN DOC REV: CPT | Performed by: SURGERY

## 2024-08-20 PROCEDURE — G8420 CALC BMI NORM PARAMETERS: HCPCS | Performed by: SURGERY

## 2024-08-20 NOTE — PROGRESS NOTES
[address] [address]  [phone] FAX: [phone]    Jessenia Prakash  1950    Chief Complaint   Patient presents with    New Patient    Results     New patient; Carotid u/s           HPI   Ms. Jessenia Prakash is a 74 y.o. year old female who with history of Parkinson's who comes with stiffness in her neck.  She denies a history of tobacco abuse or diabetes.  She denies any strokelike symptoms.    Current Outpatient Medications   Medication Sig Dispense Refill    ALPRAZolam (XANAX) 0.25 MG tablet Take 1/2-1 whole tablet by mouth twice a day as needed for anxiety.  Max of 2 tablets per 24 hours.  Take in place of clonazepam.  Do not drink alcohol, drive or operate heavy machinery after use as it may cause drowsiness. 60 tablet 1    metoprolol succinate (TOPROL XL) 25 MG extended release tablet Take 1.5 tablets by mouth daily 135 tablet 2    pravastatin (PRAVACHOL) 20 MG tablet Take 1 tablet by mouth at bedtime 90 tablet 2    carbidopa-levodopa (SINEMET)  MG per tablet Take 1 tablet by mouth 3 times daily 270 tablet 3    aspirin 81 MG chewable tablet Take 1 tablet by mouth daily      calcium carbonate (OYSTER SHELL CALCIUM 500 MG) 1250 (500 Ca) MG tablet Take by mouth daily      Cholecalciferol 50 MCG (2000 UT) TABS Take by mouth       No current facility-administered medications for this visit.     Allergies   Allergen Reactions    Dexamethasone Other (See Comments)     Steroid induced psychosis     Erythromycin Other (See Comments)     Abdominal pain     Gluten Other (See Comments)     Bloating     Lexapro [Escitalopram]      Intense hot flashes, frequent urination, muscles pain.    Ciprofloxacin Rash     Past Medical History:   Diagnosis Date    Anxiety     GERD (gastroesophageal reflux disease)     Hepatitis C     s/p 2 years interferon    Ill-defined condition     Neuropathy     Palpitations     Parkinson disease (HCC)      Family History   Problem

## 2024-08-29 DIAGNOSIS — F41.9 ANXIETY AND DEPRESSION: ICD-10-CM

## 2024-08-29 DIAGNOSIS — F32.A ANXIETY AND DEPRESSION: ICD-10-CM

## 2024-08-29 NOTE — TELEPHONE ENCOUNTER
Medication Refill Request    Name of Medication : clonazopam    Strength of Medication: .5mg     Directions: half tablet twice daily as needed    30 day or 90 day supply: 30    Preferred Pharmacy: [facility]    Last Appt. Date: 8/20/24    Next Appt. Date: 12/12/24    Additional Information For Provider:

## 2024-08-30 RX ORDER — ALPRAZOLAM 0.25 MG
TABLET ORAL
Qty: 60 TABLET | Refills: 1 | OUTPATIENT
Start: 2024-08-30 | End: 2025-03-02

## 2024-08-30 RX ORDER — ALPRAZOLAM 0.25 MG
TABLET ORAL
Qty: 60 TABLET | Refills: 1 | Status: SHIPPED | OUTPATIENT
Start: 2024-08-30 | End: 2025-03-02

## 2024-08-30 NOTE — TELEPHONE ENCOUNTER
Decision made after last visit with patient and after my discussion with patient's neurologist to switch her back to trial of alprazolam.  I have sent in a prescription to patient's pharmacy for alprazolam 0.25 mg tablets to be taken up to twice a day on 8/13/2024 with receipt having been confirmed electronically by pharmacy.  However patient was told by pharmacy that they had not received this.  Medical assistant contacted and spoke with pharmacy manager who confirmed that prescription for alprazolam had not been received. PDMP reviewed showing clonazepam 0.5 mg tablets, quantity #30, 30-day supply last filled on 6/28/2024.  New alprazolam prescription sent to pharmacy on record.    Orders Placed This Encounter    ALPRAZolam (XANAX) 0.25 MG tablet     Sig: Take 1/2-1 whole tablet by mouth twice a day as needed for anxiety.  Max of 2 tablets per 24 hours.  Take in place of clonazepam.  Do not drink alcohol, drive or operate heavy machinery after use as it may cause drowsiness.     Dispense:  60 tablet     Refill:  1

## 2024-08-30 NOTE — TELEPHONE ENCOUNTER
Pt requesting refill of clonazepam.     Scripts last fill date: 6/28 for 30 day supply    Last OV: 8/12  Next OV: 12/12

## 2024-08-30 NOTE — TELEPHONE ENCOUNTER
Chronic medical problem.  She is followed by Dr. Hernandez with nephrology.  She reports she has upcoming appointment next week with Dr. Hernandez's associate.  She had recent labs completed.  She continues with her Sensipar 60 mg every Monday, Wednesday, and Friday.  She is not taking any NSAIDs.  Her blood pressure is at goal today.  Last lab results:    Results for NEHEMIAH RAMSEY (MRN [id number]) as of 9/29/2021 10:03   Ref. Range 9/17/2021 08:41   Bun Latest Ref Range: 8 - 22 mg/dL 22   Creatinine Latest Ref Range: 0.50 - 1.40 mg/dL 1.48 (H)   GFR If  Latest Ref Range: >60 mL/min/1.73 m 2 40 (A)   GFR If Non  Latest Ref Range: >60 mL/min/1.73 m 2 33 (A)      Pt called again today and would like it sent before the end of the day

## 2024-09-09 ASSESSMENT — PATIENT HEALTH QUESTIONNAIRE - PHQ9
1. LITTLE INTEREST OR PLEASURE IN DOING THINGS: MORE THAN HALF THE DAYS
10. IF YOU CHECKED OFF ANY PROBLEMS, HOW DIFFICULT HAVE THESE PROBLEMS MADE IT FOR YOU TO DO YOUR WORK, TAKE CARE OF THINGS AT HOME, OR GET ALONG WITH OTHER PEOPLE: EXTREMELY DIFFICULT
5. POOR APPETITE OR OVEREATING: NEARLY EVERY DAY
SUM OF ALL RESPONSES TO PHQ QUESTIONS 1-9: 16
3. TROUBLE FALLING OR STAYING ASLEEP: MORE THAN HALF THE DAYS
9. THOUGHTS THAT YOU WOULD BE BETTER OFF DEAD, OR OF HURTING YOURSELF: SEVERAL DAYS
8. MOVING OR SPEAKING SO SLOWLY THAT OTHER PEOPLE COULD HAVE NOTICED. OR THE OPPOSITE, BEING SO FIGETY OR RESTLESS THAT YOU HAVE BEEN MOVING AROUND A LOT MORE THAN USUAL: NOT AT ALL
SUM OF ALL RESPONSES TO PHQ QUESTIONS 1-9: 15
7. TROUBLE CONCENTRATING ON THINGS, SUCH AS READING THE NEWSPAPER OR WATCHING TELEVISION: SEVERAL DAYS
2. FEELING DOWN, DEPRESSED OR HOPELESS: NEARLY EVERY DAY
6. FEELING BAD ABOUT YOURSELF - OR THAT YOU ARE A FAILURE OR HAVE LET YOURSELF OR YOUR FAMILY DOWN: SEVERAL DAYS
SUM OF ALL RESPONSES TO PHQ QUESTIONS 1-9: 16
4. FEELING TIRED OR HAVING LITTLE ENERGY: NEARLY EVERY DAY
SUM OF ALL RESPONSES TO PHQ9 QUESTIONS 1 & 2: 5
SUM OF ALL RESPONSES TO PHQ QUESTIONS 1-9: 16

## 2024-09-09 ASSESSMENT — ANXIETY QUESTIONNAIRES
GAD7 TOTAL SCORE: 13
5. BEING SO RESTLESS THAT IT IS HARD TO SIT STILL: SEVERAL DAYS
1. FEELING NERVOUS, ANXIOUS, OR ON EDGE: NEARLY EVERY DAY
IF YOU CHECKED OFF ANY PROBLEMS ON THIS QUESTIONNAIRE, HOW DIFFICULT HAVE THESE PROBLEMS MADE IT FOR YOU TO DO YOUR WORK, TAKE CARE OF THINGS AT HOME, OR GET ALONG WITH OTHER PEOPLE: SOMEWHAT DIFFICULT
2. NOT BEING ABLE TO STOP OR CONTROL WORRYING: MORE THAN HALF THE DAYS
3. WORRYING TOO MUCH ABOUT DIFFERENT THINGS: MORE THAN HALF THE DAYS
6. BECOMING EASILY ANNOYED OR IRRITABLE: SEVERAL DAYS
4. TROUBLE RELAXING: MORE THAN HALF THE DAYS
7. FEELING AFRAID AS IF SOMETHING AWFUL MIGHT HAPPEN: MORE THAN HALF THE DAYS

## 2024-09-11 ENCOUNTER — OFFICE VISIT (OUTPATIENT)
Dept: BEHAVIORAL/MENTAL HEALTH CLINIC | Facility: CLINIC | Age: 74
End: 2024-09-11
Payer: MEDICARE

## 2024-09-11 VITALS
SYSTOLIC BLOOD PRESSURE: 110 MMHG | OXYGEN SATURATION: 99 % | BODY MASS INDEX: 20.31 KG/M2 | HEIGHT: 67 IN | DIASTOLIC BLOOD PRESSURE: 60 MMHG | WEIGHT: 129.4 LBS | HEART RATE: 98 BPM

## 2024-09-11 DIAGNOSIS — F33.0 MILD EPISODE OF RECURRENT MAJOR DEPRESSIVE DISORDER (HCC): Primary | ICD-10-CM

## 2024-09-11 DIAGNOSIS — F41.0 PANIC ATTACKS: ICD-10-CM

## 2024-09-11 DIAGNOSIS — G47.00 INSOMNIA, UNSPECIFIED TYPE: ICD-10-CM

## 2024-09-11 DIAGNOSIS — F41.9 ANXIETY DISORDER, UNSPECIFIED TYPE: ICD-10-CM

## 2024-09-11 PROCEDURE — 90792 PSYCH DIAG EVAL W/MED SRVCS: CPT | Performed by: PSYCHIATRY & NEUROLOGY

## 2024-09-11 RX ORDER — PAROXETINE 10 MG/1
10 TABLET, FILM COATED ORAL DAILY
Qty: 30 TABLET | Refills: 1 | Status: SHIPPED | OUTPATIENT
Start: 2024-09-11

## 2024-09-11 RX ORDER — DIAZEPAM 2 MG
1-2 TABLET ORAL NIGHTLY PRN
Qty: 30 TABLET | Refills: 1 | Status: SHIPPED | OUTPATIENT
Start: 2024-09-11 | End: 2024-11-10

## 2024-09-11 ASSESSMENT — COLUMBIA-SUICIDE SEVERITY RATING SCALE - C-SSRS
6. IN YOUR LIFETIME, HAVE YOU EVER DONE ANYTHING, STARTED TO DO ANYTHING, OR PREPARED TO DO ANYTHING TO END YOUR LIFE?: NO
2. IN THE PAST MONTH, HAVE YOU ACTUALLY HAD ANY THOUGHTS OF KILLING YOURSELF?: NO
1. IN THE PAST MONTH, HAVE YOU WISHED YOU WERE DEAD OR WISHED YOU COULD GO TO SLEEP AND NOT WAKE UP?: YES

## 2024-09-11 ASSESSMENT — PATIENT HEALTH QUESTIONNAIRE - PHQ9
4. FEELING TIRED OR HAVING LITTLE ENERGY: NEARLY EVERY DAY
3. TROUBLE FALLING OR STAYING ASLEEP: MORE THAN HALF THE DAYS
1. LITTLE INTEREST OR PLEASURE IN DOING THINGS: MORE THAN HALF THE DAYS
9. THOUGHTS THAT YOU WOULD BE BETTER OFF DEAD, OR OF HURTING YOURSELF: SEVERAL DAYS
SUM OF ALL RESPONSES TO PHQ QUESTIONS 1-9: 16
2. FEELING DOWN, DEPRESSED OR HOPELESS: NEARLY EVERY DAY
5. POOR APPETITE OR OVEREATING: NEARLY EVERY DAY
6. FEELING BAD ABOUT YOURSELF - OR THAT YOU ARE A FAILURE OR HAVE LET YOURSELF OR YOUR FAMILY DOWN: SEVERAL DAYS
8. MOVING OR SPEAKING SO SLOWLY THAT OTHER PEOPLE COULD HAVE NOTICED. OR THE OPPOSITE - BEING SO FIDGETY OR RESTLESS THAT YOU HAVE BEEN MOVING AROUND A LOT MORE THAN USUAL: NOT AT ALL
7. TROUBLE CONCENTRATING ON THINGS, SUCH AS READING THE NEWSPAPER OR WATCHING TELEVISION: SEVERAL DAYS
10. IF YOU CHECKED OFF ANY PROBLEMS, HOW DIFFICULT HAVE THESE PROBLEMS MADE IT FOR YOU TO DO YOUR WORK, TAKE CARE OF THINGS AT HOME, OR GET ALONG WITH OTHER PEOPLE: EXTREMELY DIFFICULT

## 2024-09-11 ASSESSMENT — ANXIETY QUESTIONNAIRES
3. WORRYING TOO MUCH ABOUT DIFFERENT THINGS: MORE THAN HALF THE DAYS
1. FEELING NERVOUS, ANXIOUS, OR ON EDGE: NEARLY EVERY DAY
IF YOU CHECKED OFF ANY PROBLEMS ON THIS QUESTIONNAIRE, HOW DIFFICULT HAVE THESE PROBLEMS MADE IT FOR YOU TO DO YOUR WORK, TAKE CARE OF THINGS AT HOME, OR GET ALONG WITH OTHER PEOPLE: SOMEWHAT DIFFICULT
7. FEELING AFRAID AS IF SOMETHING AWFUL MIGHT HAPPEN: MORE THAN HALF THE DAYS
6. BECOMING EASILY ANNOYED OR IRRITABLE: SEVERAL DAYS
4. TROUBLE RELAXING: MORE THAN HALF THE DAYS
2. NOT BEING ABLE TO STOP OR CONTROL WORRYING: MORE THAN HALF THE DAYS
5. BEING SO RESTLESS THAT IT IS HARD TO SIT STILL: SEVERAL DAYS

## 2024-09-16 ENCOUNTER — TELEPHONE (OUTPATIENT)
Dept: BEHAVIORAL/MENTAL HEALTH CLINIC | Facility: CLINIC | Age: 74
End: 2024-09-16

## 2024-12-03 NOTE — ASSESSMENT & PLAN NOTE
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

## 2024-12-03 NOTE — PROGRESS NOTES
Jessenia Prakash (:  1950) is a 74 y.o. female,Established patient, here for evaluation of the following chief complaint(s):  Follow-up (Pt is here for a 4 month follow up. ), Depression (Pt would like to discuss an increase in depression. ), and Fatigue (Pt reports increased fatigue over the past year. 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, decreased intraepidermal nerve fiber density consistent with a small fiber neuropathy  MRI of the brain without contrast on 3/16/2020 with teardrop shaped neural cyst or prominent perivascular space is demonstrated in the left centrum semiovale  Continue carbidopa/levodopa per neurology  Discussed getting her set up with physical therapy through neurologist office to help with fatigue which I believe is partly due to Parkinson's but patient does not drive and her  works which would make scheduling difficult         Anxiety and depression  Prior therapies include Lexapro  Prior rescue therapies include alprazolam and clonazepam  At last visit with myself decision made to switch from clonazepam back to alprazolam given patient felt like it was more efficacious  Controlled substance agreement previously signed  Has since been evaluated by psychiatry who recommended low-dose paroxetine and as needed diazepam in place of alternative benzodiazepine.  However patient did not take either of these out of concern for side effects  Would like referral to [facility] for mental health needs for which referral has been placed  Advised patient if she will run out of her benzodiazepine prior to making an appointment to alert myself as she should not stop this medication abruptly  Monitor for worsening mood symptoms and should they occur patient to contact PCP

## 2024-12-12 ENCOUNTER — OFFICE VISIT (OUTPATIENT)
Dept: INTERNAL MEDICINE CLINIC | Facility: CLINIC | Age: 74
End: 2024-12-12

## 2024-12-12 VITALS
WEIGHT: 130.8 LBS | OXYGEN SATURATION: 98 % | HEART RATE: 93 BPM | BODY MASS INDEX: 20.53 KG/M2 | TEMPERATURE: 97 F | DIASTOLIC BLOOD PRESSURE: 72 MMHG | SYSTOLIC BLOOD PRESSURE: 108 MMHG | HEIGHT: 67 IN

## 2024-12-12 DIAGNOSIS — I67.2 INTRACRANIAL ATHEROSCLEROSIS: ICD-10-CM

## 2024-12-12 DIAGNOSIS — R53.82 CHRONIC FATIGUE: ICD-10-CM

## 2024-12-12 DIAGNOSIS — F41.9 ANXIETY AND DEPRESSION: ICD-10-CM

## 2024-12-12 DIAGNOSIS — R00.2 PALPITATIONS: ICD-10-CM

## 2024-12-12 DIAGNOSIS — G20.A1 PARKINSON'S DISEASE WITHOUT DYSKINESIA, UNSPECIFIED WHETHER MANIFESTATIONS FLUCTUATE (HCC): Primary | ICD-10-CM

## 2024-12-12 DIAGNOSIS — F32.A ANXIETY AND DEPRESSION: ICD-10-CM

## 2024-12-12 RX ORDER — DOCUSATE SODIUM 100 MG/1
100 CAPSULE, LIQUID FILLED ORAL 2 TIMES DAILY
COMMUNITY

## 2024-12-12 RX ORDER — LATANOPROST 50 UG/ML
1 SOLUTION/ DROPS OPHTHALMIC NIGHTLY
COMMUNITY

## 2024-12-12 RX ORDER — PRAVASTATIN SODIUM 20 MG
20 TABLET ORAL NIGHTLY
Qty: 90 TABLET | Refills: 2 | Status: SHIPPED | OUTPATIENT
Start: 2024-12-12

## 2024-12-12 RX ORDER — METOPROLOL SUCCINATE 25 MG/1
TABLET, EXTENDED RELEASE ORAL
Qty: 135 TABLET | Refills: 2 | Status: SHIPPED | OUTPATIENT
Start: 2024-12-12

## 2024-12-12 SDOH — ECONOMIC STABILITY: INCOME INSECURITY: HOW HARD IS IT FOR YOU TO PAY FOR THE VERY BASICS LIKE FOOD, HOUSING, MEDICAL CARE, AND HEATING?: PATIENT DECLINED

## 2024-12-12 SDOH — ECONOMIC STABILITY: FOOD INSECURITY: WITHIN THE PAST 12 MONTHS, THE FOOD YOU BOUGHT JUST DIDN'T LAST AND YOU DIDN'T HAVE MONEY TO GET MORE.: PATIENT DECLINED

## 2024-12-12 SDOH — ECONOMIC STABILITY: FOOD INSECURITY: WITHIN THE PAST 12 MONTHS, YOU WORRIED THAT YOUR FOOD WOULD RUN OUT BEFORE YOU GOT MONEY TO BUY MORE.: PATIENT DECLINED

## 2024-12-12 ASSESSMENT — PATIENT HEALTH QUESTIONNAIRE - PHQ9
8. MOVING OR SPEAKING SO SLOWLY THAT OTHER PEOPLE COULD HAVE NOTICED. OR THE OPPOSITE, BEING SO FIGETY OR RESTLESS THAT YOU HAVE BEEN MOVING AROUND A LOT MORE THAN USUAL: NEARLY EVERY DAY
4. FEELING TIRED OR HAVING LITTLE ENERGY: MORE THAN HALF THE DAYS
3. TROUBLE FALLING OR STAYING ASLEEP: NEARLY EVERY DAY
10. IF YOU CHECKED OFF ANY PROBLEMS, HOW DIFFICULT HAVE THESE PROBLEMS MADE IT FOR YOU TO DO YOUR WORK, TAKE CARE OF THINGS AT HOME, OR GET ALONG WITH OTHER PEOPLE: NOT DIFFICULT AT ALL
1. LITTLE INTEREST OR PLEASURE IN DOING THINGS: NEARLY EVERY DAY
9. THOUGHTS THAT YOU WOULD BE BETTER OFF DEAD, OR OF HURTING YOURSELF: NOT AT ALL
SUM OF ALL RESPONSES TO PHQ QUESTIONS 1-9: 17
SUM OF ALL RESPONSES TO PHQ QUESTIONS 1-9: 17
SUM OF ALL RESPONSES TO PHQ9 QUESTIONS 1 & 2: 6
7. TROUBLE CONCENTRATING ON THINGS, SUCH AS READING THE NEWSPAPER OR WATCHING TELEVISION: NOT AT ALL
SUM OF ALL RESPONSES TO PHQ QUESTIONS 1-9: 17
2. FEELING DOWN, DEPRESSED OR HOPELESS: NEARLY EVERY DAY
6. FEELING BAD ABOUT YOURSELF - OR THAT YOU ARE A FAILURE OR HAVE LET YOURSELF OR YOUR FAMILY DOWN: NOT AT ALL
SUM OF ALL RESPONSES TO PHQ QUESTIONS 1-9: 17
5. POOR APPETITE OR OVEREATING: NEARLY EVERY DAY

## 2024-12-12 ASSESSMENT — ENCOUNTER SYMPTOMS
NAUSEA: 1
CONSTIPATION: 1
VOMITING: 0
BLOOD IN STOOL: 0
ANAL BLEEDING: 0
ABDOMINAL PAIN: 0
SHORTNESS OF BREATH: 0
TROUBLE SWALLOWING: 1

## 2024-12-12 NOTE — ASSESSMENT & PLAN NOTE
Suspect multifactorial in setting of increased anxiety and depression, underlying Parkinson's, possible fluctuations in blood pressure  Orthostatic vital signs weakly positive in the office today.  Counseled on precautions however hold off midodrine at this time  Management of mood disorder as above  Sleep somewhat interrupted from nocturia but no other signs or symptoms suggestive of infection and patient states this does not feel similar to UTIs that she has had before  Given no frequent urinary incontinence or urgency with desired to avoid anticholinergics like oxybutynin  Discussed physical therapy through neurology office for Parkinson's but transportation issues would make this challenging  Monitor for now

## 2025-01-17 ENCOUNTER — OFFICE VISIT (OUTPATIENT)
Dept: INTERNAL MEDICINE CLINIC | Facility: CLINIC | Age: 75
End: 2025-01-17

## 2025-01-17 VITALS
DIASTOLIC BLOOD PRESSURE: 70 MMHG | SYSTOLIC BLOOD PRESSURE: 98 MMHG | WEIGHT: 127.2 LBS | OXYGEN SATURATION: 99 % | HEART RATE: 102 BPM | TEMPERATURE: 96.8 F | BODY MASS INDEX: 19.97 KG/M2 | HEIGHT: 67 IN

## 2025-01-17 DIAGNOSIS — G20.A1 PARKINSON'S DISEASE WITHOUT DYSKINESIA, UNSPECIFIED WHETHER MANIFESTATIONS FLUCTUATE (HCC): ICD-10-CM

## 2025-01-17 DIAGNOSIS — Z12.11 SCREEN FOR COLON CANCER: ICD-10-CM

## 2025-01-17 DIAGNOSIS — K59.09 CHRONIC CONSTIPATION: Primary | ICD-10-CM

## 2025-01-17 DIAGNOSIS — R19.8 ALTERNATING CONSTIPATION AND DIARRHEA: ICD-10-CM

## 2025-01-17 SDOH — ECONOMIC STABILITY: FOOD INSECURITY: WITHIN THE PAST 12 MONTHS, THE FOOD YOU BOUGHT JUST DIDN'T LAST AND YOU DIDN'T HAVE MONEY TO GET MORE.: NEVER TRUE

## 2025-01-17 SDOH — ECONOMIC STABILITY: FOOD INSECURITY: WITHIN THE PAST 12 MONTHS, YOU WORRIED THAT YOUR FOOD WOULD RUN OUT BEFORE YOU GOT MONEY TO BUY MORE.: NEVER TRUE

## 2025-01-17 SDOH — ECONOMIC STABILITY: INCOME INSECURITY: IN THE LAST 12 MONTHS, WAS THERE A TIME WHEN YOU WERE NOT ABLE TO PAY THE MORTGAGE OR RENT ON TIME?: NO

## 2025-01-17 SDOH — ECONOMIC STABILITY: TRANSPORTATION INSECURITY
IN THE PAST 12 MONTHS, HAS LACK OF TRANSPORTATION KEPT YOU FROM MEETINGS, WORK, OR FROM GETTING THINGS NEEDED FOR DAILY LIVING?: YES

## 2025-01-17 SDOH — ECONOMIC STABILITY: TRANSPORTATION INSECURITY
IN THE PAST 12 MONTHS, HAS THE LACK OF TRANSPORTATION KEPT YOU FROM MEDICAL APPOINTMENTS OR FROM GETTING MEDICATIONS?: NO

## 2025-01-17 ASSESSMENT — PATIENT HEALTH QUESTIONNAIRE - PHQ9
6. FEELING BAD ABOUT YOURSELF - OR THAT YOU ARE A FAILURE OR HAVE LET YOURSELF OR YOUR FAMILY DOWN: SEVERAL DAYS
3. TROUBLE FALLING OR STAYING ASLEEP: MORE THAN HALF THE DAYS
4. FEELING TIRED OR HAVING LITTLE ENERGY: NEARLY EVERY DAY
1. LITTLE INTEREST OR PLEASURE IN DOING THINGS: SEVERAL DAYS
2. FEELING DOWN, DEPRESSED OR HOPELESS: SEVERAL DAYS
3. TROUBLE FALLING OR STAYING ASLEEP: MORE THAN HALF THE DAYS
2. FEELING DOWN, DEPRESSED OR HOPELESS: SEVERAL DAYS
7. TROUBLE CONCENTRATING ON THINGS, SUCH AS READING THE NEWSPAPER OR WATCHING TELEVISION: SEVERAL DAYS
SUM OF ALL RESPONSES TO PHQ QUESTIONS 1-9: 12
SUM OF ALL RESPONSES TO PHQ9 QUESTIONS 1 & 2: 2
10. IF YOU CHECKED OFF ANY PROBLEMS, HOW DIFFICULT HAVE THESE PROBLEMS MADE IT FOR YOU TO DO YOUR WORK, TAKE CARE OF THINGS AT HOME, OR GET ALONG WITH OTHER PEOPLE: SOMEWHAT DIFFICULT
6. FEELING BAD ABOUT YOURSELF - OR THAT YOU ARE A FAILURE OR HAVE LET YOURSELF OR YOUR FAMILY DOWN: SEVERAL DAYS
SUM OF ALL RESPONSES TO PHQ QUESTIONS 1-9: 12
8. MOVING OR SPEAKING SO SLOWLY THAT OTHER PEOPLE COULD HAVE NOTICED. OR THE OPPOSITE - BEING SO FIDGETY OR RESTLESS THAT YOU HAVE BEEN MOVING AROUND A LOT MORE THAN USUAL: NOT AT ALL
4. FEELING TIRED OR HAVING LITTLE ENERGY: NEARLY EVERY DAY
9. THOUGHTS THAT YOU WOULD BE BETTER OFF DEAD, OR OF HURTING YOURSELF: NOT AT ALL
5. POOR APPETITE OR OVEREATING: NEARLY EVERY DAY
SUM OF ALL RESPONSES TO PHQ QUESTIONS 1-9: 12
7. TROUBLE CONCENTRATING ON THINGS, SUCH AS READING THE NEWSPAPER OR WATCHING TELEVISION: SEVERAL DAYS
SUM OF ALL RESPONSES TO PHQ QUESTIONS 1-9: 12
10. IF YOU CHECKED OFF ANY PROBLEMS, HOW DIFFICULT HAVE THESE PROBLEMS MADE IT FOR YOU TO DO YOUR WORK, TAKE CARE OF THINGS AT HOME, OR GET ALONG WITH OTHER PEOPLE: SOMEWHAT DIFFICULT
8. MOVING OR SPEAKING SO SLOWLY THAT OTHER PEOPLE COULD HAVE NOTICED. OR THE OPPOSITE, BEING SO FIGETY OR RESTLESS THAT YOU HAVE BEEN MOVING AROUND A LOT MORE THAN USUAL: NOT AT ALL
5. POOR APPETITE OR OVEREATING: NEARLY EVERY DAY
1. LITTLE INTEREST OR PLEASURE IN DOING THINGS: SEVERAL DAYS
SUM OF ALL RESPONSES TO PHQ QUESTIONS 1-9: 12
9. THOUGHTS THAT YOU WOULD BE BETTER OFF DEAD, OR OF HURTING YOURSELF: NOT AT ALL

## 2025-01-17 ASSESSMENT — ENCOUNTER SYMPTOMS
DIARRHEA: 1
NAUSEA: 0
CONSTIPATION: 1
BLOOD IN STOOL: 0
SHORTNESS OF BREATH: 0
VOMITING: 0
ABDOMINAL PAIN: 0
COUGH: 0

## 2025-01-17 NOTE — PROGRESS NOTES
[facility]      2025    Patient Name: Jessenia Prakash  :  1950      Chief Complaint:  Chief Complaint   Patient presents with    Other     Patient is having problem with bowel movements since 1/10/2025         HPI  Patient presents today accompanied by her  with complaint of alternating constipation and diarrhea for the past week. She reports chronic trouble with constipation since around the time she was diagnosed with Parkinson's in . She takes Colace twice daily, drinks 60-70 oz of water daily and eats \"a lot\" of fiber in her daily diet which typically keeps the constipation under fairly good control. However, last Friday (1/10/25), she reports having several loose bowel movements. No associated fever, chills, N/V or blood in the stool. After that, she reports having no bowel movement for 2 days, so she took a Dulcolax. She had a small bowel movement on Monday of this week. Then again, no bowel movement for 2 days, so she took another Dulcolax last night. Then she had 8-9 episodes of diarrhea between 4AM-8AM this morning. No bowel movement in the past 3 hours. She still denies any abdominal pain, blood in the stool, fever, chills or N/V.   Due for colon cancer screening. She estimates that last colonoscopy was over 20 years ago.       Past Medical History:   Diagnosis Date    Anxiety     GERD (gastroesophageal reflux disease)     Glaucoma     Hepatitis C     s/p 2 years interferon    Ill-defined condition     Neuropathy     Palpitations     Parkinson disease (HCC)        Past Surgical History:   Procedure Laterality Date    CHOLECYSTECTOMY      HYSTERECTOMY (CERVIX STATUS UNKNOWN)      LUMBAR LAMINECTOMY      schwannoma       Family History   Problem Relation Age of Onset    Hypertension Mother     Elevated Lipids Mother     Tremors Mother         essential    Hypertension Father     Heart Failure Father         CAD    Elevated Lipids Father     Elevated Lipids Sister

## 2025-01-19 ENCOUNTER — APPOINTMENT (OUTPATIENT)
Dept: GENERAL RADIOLOGY | Age: 75
End: 2025-01-19
Payer: MEDICARE

## 2025-01-19 ENCOUNTER — HOSPITAL ENCOUNTER (EMERGENCY)
Age: 75
Discharge: HOME | End: 2025-01-20
Attending: STUDENT IN AN ORGANIZED HEALTH CARE EDUCATION/TRAINING PROGRAM
Payer: MEDICARE

## 2025-01-19 VITALS
HEART RATE: 87 BPM | RESPIRATION RATE: 18 BRPM | WEIGHT: 127 LBS | HEIGHT: 67 IN | TEMPERATURE: 97.9 F | BODY MASS INDEX: 19.93 KG/M2 | DIASTOLIC BLOOD PRESSURE: 70 MMHG | SYSTOLIC BLOOD PRESSURE: 151 MMHG | OXYGEN SATURATION: 95 %

## 2025-01-19 DIAGNOSIS — K59.00 CONSTIPATION, UNSPECIFIED CONSTIPATION TYPE: ICD-10-CM

## 2025-01-19 DIAGNOSIS — R07.9 CHEST PAIN, UNSPECIFIED TYPE: Primary | ICD-10-CM

## 2025-01-19 LAB
ALBUMIN SERPL-MCNC: 4 G/DL (ref 3.2–4.6)
ALBUMIN/GLOB SERPL: 1.5 (ref 1–1.9)
ALP SERPL-CCNC: 71 U/L (ref 35–104)
ALT SERPL-CCNC: 6 U/L (ref 8–45)
ANION GAP SERPL CALC-SCNC: 13 MMOL/L (ref 7–16)
AST SERPL-CCNC: 27 U/L (ref 15–37)
BASOPHILS # BLD: 0.06 K/UL (ref 0–0.2)
BASOPHILS NFR BLD: 1.1 % (ref 0–2)
BILIRUB SERPL-MCNC: 0.8 MG/DL (ref 0–1.2)
BUN SERPL-MCNC: 12 MG/DL (ref 8–23)
CALCIUM SERPL-MCNC: 9.9 MG/DL (ref 8.8–10.2)
CHLORIDE SERPL-SCNC: 101 MMOL/L (ref 98–107)
CO2 SERPL-SCNC: 23 MMOL/L (ref 20–29)
CREAT SERPL-MCNC: 1.01 MG/DL (ref 0.6–1.1)
D DIMER PPP FEU-MCNC: <0.27 UG/ML(FEU)
DIFFERENTIAL METHOD BLD: ABNORMAL
EOSINOPHIL # BLD: 0.25 K/UL (ref 0–0.8)
EOSINOPHIL NFR BLD: 4.5 % (ref 0.5–7.8)
ERYTHROCYTE [DISTWIDTH] IN BLOOD BY AUTOMATED COUNT: 12.5 % (ref 11.9–14.6)
GLOBULIN SER CALC-MCNC: 2.7 G/DL (ref 2.3–3.5)
GLUCOSE SERPL-MCNC: 100 MG/DL (ref 70–99)
HCT VFR BLD AUTO: 39.2 % (ref 35.8–46.3)
HGB BLD-MCNC: 13.4 G/DL (ref 11.7–15.4)
IMM GRANULOCYTES # BLD AUTO: 0.01 K/UL (ref 0–0.5)
IMM GRANULOCYTES NFR BLD AUTO: 0.2 % (ref 0–5)
LIPASE SERPL-CCNC: 57 U/L (ref 13–60)
LYMPHOCYTES # BLD: 2.48 K/UL (ref 0.5–4.6)
LYMPHOCYTES NFR BLD: 44.2 % (ref 13–44)
MAGNESIUM SERPL-MCNC: 2 MG/DL (ref 1.8–2.4)
MCH RBC QN AUTO: 29.6 PG (ref 26.1–32.9)
MCHC RBC AUTO-ENTMCNC: 34.2 G/DL (ref 31.4–35)
MCV RBC AUTO: 86.5 FL (ref 82–102)
MONOCYTES # BLD: 0.51 K/UL (ref 0.1–1.3)
MONOCYTES NFR BLD: 9.1 % (ref 4–12)
NEUTS SEG # BLD: 2.3 K/UL (ref 1.7–8.2)
NEUTS SEG NFR BLD: 40.9 % (ref 43–78)
NRBC # BLD: 0 K/UL (ref 0–0.2)
PLATELET # BLD AUTO: 279 K/UL (ref 150–450)
PMV BLD AUTO: 9.9 FL (ref 9.4–12.3)
POTASSIUM SERPL-SCNC: 3.5 MMOL/L (ref 3.5–5.1)
PROT SERPL-MCNC: 6.7 G/DL (ref 6.3–8.2)
RBC # BLD AUTO: 4.53 M/UL (ref 4.05–5.2)
SODIUM SERPL-SCNC: 137 MMOL/L (ref 136–145)
TROPONIN T SERPL HS-MCNC: <6 NG/L (ref 0–14)
WBC # BLD AUTO: 5.6 K/UL (ref 4.3–11.1)

## 2025-01-19 PROCEDURE — 83690 ASSAY OF LIPASE: CPT

## 2025-01-19 PROCEDURE — 85379 FIBRIN DEGRADATION QUANT: CPT

## 2025-01-19 PROCEDURE — 6370000000 HC RX 637 (ALT 250 FOR IP): Performed by: STUDENT IN AN ORGANIZED HEALTH CARE EDUCATION/TRAINING PROGRAM

## 2025-01-19 PROCEDURE — 83735 ASSAY OF MAGNESIUM: CPT

## 2025-01-19 PROCEDURE — 85025 COMPLETE CBC W/AUTO DIFF WBC: CPT

## 2025-01-19 PROCEDURE — 84484 ASSAY OF TROPONIN QUANT: CPT

## 2025-01-19 PROCEDURE — 80053 COMPREHEN METABOLIC PANEL: CPT

## 2025-01-19 PROCEDURE — 74022 RADEX COMPL AQT ABD SERIES: CPT

## 2025-01-19 PROCEDURE — 2580000003 HC RX 258: Performed by: STUDENT IN AN ORGANIZED HEALTH CARE EDUCATION/TRAINING PROGRAM

## 2025-01-19 PROCEDURE — 99285 EMERGENCY DEPT VISIT HI MDM: CPT

## 2025-01-19 PROCEDURE — 93005 ELECTROCARDIOGRAM TRACING: CPT | Performed by: STUDENT IN AN ORGANIZED HEALTH CARE EDUCATION/TRAINING PROGRAM

## 2025-01-19 RX ORDER — 0.9 % SODIUM CHLORIDE 0.9 %
500 INTRAVENOUS SOLUTION INTRAVENOUS
Status: COMPLETED | OUTPATIENT
Start: 2025-01-19 | End: 2025-01-19

## 2025-01-19 RX ORDER — ASPIRIN 325 MG
325 TABLET ORAL
Status: COMPLETED | OUTPATIENT
Start: 2025-01-19 | End: 2025-01-19

## 2025-01-19 RX ADMIN — ASPIRIN 325 MG: 325 TABLET, FILM COATED ORAL at 22:48

## 2025-01-19 RX ADMIN — SODIUM CHLORIDE 500 ML: 9 INJECTION, SOLUTION INTRAVENOUS at 22:47

## 2025-01-19 ASSESSMENT — PAIN DESCRIPTION - LOCATION: LOCATION: CHEST

## 2025-01-19 ASSESSMENT — PAIN SCALES - GENERAL: PAINLEVEL_OUTOF10: 7

## 2025-01-20 LAB
EKG ATRIAL RATE: 93 BPM
EKG DIAGNOSIS: NORMAL
EKG P AXIS: 81 DEGREES
EKG P-R INTERVAL: 160 MS
EKG Q-T INTERVAL: 384 MS
EKG QRS DURATION: 84 MS
EKG QTC CALCULATION (BAZETT): 477 MS
EKG R AXIS: 73 DEGREES
EKG T AXIS: -35 DEGREES
EKG VENTRICULAR RATE: 93 BPM
TROPONIN T SERPL HS-MCNC: <6 NG/L (ref 0–14)

## 2025-01-20 PROCEDURE — 93010 ELECTROCARDIOGRAM REPORT: CPT | Performed by: INTERNAL MEDICINE

## 2025-01-20 PROCEDURE — 84484 ASSAY OF TROPONIN QUANT: CPT

## 2025-01-20 RX ORDER — SUCRALFATE 1 G/1
1 TABLET ORAL 4 TIMES DAILY
Qty: 120 TABLET | Refills: 3 | Status: SHIPPED | OUTPATIENT
Start: 2025-01-20

## 2025-01-20 RX ORDER — POLYETHYLENE GLYCOL 3350 17 G/17G
17 POWDER, FOR SOLUTION ORAL 2 TIMES DAILY
Qty: 507 G | Refills: 0 | Status: SHIPPED | OUTPATIENT
Start: 2025-01-20 | End: 2025-02-19

## 2025-01-20 NOTE — ED PROVIDER NOTES
mg/dL    BUN 12 8 - 23 MG/DL    Creatinine 1.01 0.60 - 1.10 MG/DL    Est, Glom Filt Rate 59 (L) >60 ml/min/1.73m2    Calcium 9.9 8.8 - 10.2 MG/DL    Total Bilirubin 0.8 0.0 - 1.2 MG/DL    ALT 6 (L) 8 - 45 U/L    AST 27 15 - 37 U/L    Alk Phosphatase 71 35 - 104 U/L    Total Protein 6.7 6.3 - 8.2 g/dL    Albumin 4.0 3.2 - 4.6 g/dL    Globulin 2.7 2.3 - 3.5 g/dL    Albumin/Globulin Ratio 1.5 1.0 - 1.9     Troponin now then q90 min for 2 occurances   Result Value Ref Range    Troponin T <6.0 0 - 14 ng/L   Troponin now then q90 min for 2 occurances   Result Value Ref Range    Troponin T <6.0 0 - 14 ng/L   D-Dimer, Quantitative   Result Value Ref Range    D-Dimer, Quant <0.27 <0.56 ug/ml(FEU)   Lipase   Result Value Ref Range    Lipase 57 13 - 60 U/L   Magnesium   Result Value Ref Range    Magnesium 2.0 1.8 - 2.4 mg/dL   EKG 12 Lead   Result Value Ref Range    Ventricular Rate 93 BPM    Atrial Rate 93 BPM    P-R Interval 160 ms    QRS Duration 84 ms    Q-T Interval 384 ms    QTc Calculation (Bazett) 477 ms    P Axis 81 degrees    R Axis 73 degrees    T Axis -35 degrees    Diagnosis       Normal sinus rhythm  Possible Left atrial enlargement  ST & T wave abnormality, consider inferior ischemia  Prolonged QT  Abnormal ECG  When compared with ECG of 30-AUG-2023 08:41,  PREVIOUS ECG IS PRESENT           XR ACUTE ABD SERIES CHEST 1 VW   Final Result   No acute findings in the chest or abdomen.         Electronically signed by Boyd Guerrero                   No results for input(s): \"COVID19\" in the last 72 hours.     Voice dictation software was used during the making of this note.  This software is not perfect and grammatical and other typographical errors may be present.  This note has not been completely proofread for errors.      Steve Locke, DO  01/20/25 0142

## 2025-01-20 NOTE — FLOWSHEET NOTE
Patient mobility status  with no difficulty.     I have reviewed discharge instructions with the patient.  The patient verbalized understanding.    Patient left ED via Discharge Method: ambulatory to Home with  spouse .    Opportunity for questions and clarification provided.     Patient given 0 scripts.

## 2025-02-01 NOTE — PROGRESS NOTES
[facility] NEUROLOGY  [address], [address]  [place] [address]  Phone: [phone] Fax [phone]  Mitchell Barkley MD      Patient: Jessenia Prakash  Provider: Mitchell Barkley MD    CC:   Chief Complaint   Patient presents with    Follow-up    Neurologic Problem     Referring Provider:    History of Present Illness:     Jessenia Prakash is a 74 y.o. RH female who presents for follow-up of tremor and probable Parkinson's disease.     She is unaccompanied for today's visit.  She was last seen August 2024.        Patient presents for follow-up and continued management of idiopathic Parkinson's disease, symptom onset in 2021 with right sided tremor. She had a Syn-One skin biopsy which was supportive of the diagnosis.    Current medications include (but not limited to):   Sinemet  mg 1 tablet 3 times a day (7am, 1 pm, 7 pm)  Valium 2 mg as needed at night  Paxil 10 mg daily (patient not taking)  Clonazepam 0.25 mg twice a day as needed for anxiety    Previous medication trials include: Lexapro, clonazepam    Patient presents today for follow-up.    No significant changes since the last visit.      She establish care with Dr. Goode for persistent anxiety and panic attacks.  Started Paxil 10 mg daily.  Taking Valium 2 mg as needed for panic attacks.  It does not appear she is currently taking the Paxil.  In general she has very poor medication tolerance.    Motor symptoms have included some breakthrough tremor of the right upper extremity with her left side remaining relatively spared.  She has had difficulty with \"frozen shoulder\" on the right for which she has participated in physical therapy in the past.  She does well with respect to her gait.  No recent falls.    Remains on Sinemet, taking 1 tablet 3 times a day.  There has been some perception of motor fluctuations and possible wearing off, for which the dose was increased at the last visit.  She notices some potential side effects, often

## 2025-02-03 ENCOUNTER — OFFICE VISIT (OUTPATIENT)
Dept: NEUROLOGY | Age: 75
End: 2025-02-03
Payer: MEDICARE

## 2025-02-03 DIAGNOSIS — F41.9 ANXIETY AND DEPRESSION: ICD-10-CM

## 2025-02-03 DIAGNOSIS — Z86.73 HISTORY OF TIA (TRANSIENT ISCHEMIC ATTACK): ICD-10-CM

## 2025-02-03 DIAGNOSIS — G20.A1 PARKINSON'S DISEASE WITHOUT DYSKINESIA OR FLUCTUATING MANIFESTATIONS (HCC): Primary | ICD-10-CM

## 2025-02-03 DIAGNOSIS — R26.9 GAIT DISTURBANCE: ICD-10-CM

## 2025-02-03 DIAGNOSIS — F32.A ANXIETY AND DEPRESSION: ICD-10-CM

## 2025-02-03 PROCEDURE — G8420 CALC BMI NORM PARAMETERS: HCPCS | Performed by: PSYCHIATRY & NEUROLOGY

## 2025-02-03 PROCEDURE — G8399 PT W/DXA RESULTS DOCUMENT: HCPCS | Performed by: PSYCHIATRY & NEUROLOGY

## 2025-02-03 PROCEDURE — G8428 CUR MEDS NOT DOCUMENT: HCPCS | Performed by: PSYCHIATRY & NEUROLOGY

## 2025-02-03 PROCEDURE — 1123F ACP DISCUSS/DSCN MKR DOCD: CPT | Performed by: PSYCHIATRY & NEUROLOGY

## 2025-02-03 PROCEDURE — 1090F PRES/ABSN URINE INCON ASSESS: CPT | Performed by: PSYCHIATRY & NEUROLOGY

## 2025-02-03 PROCEDURE — 3017F COLORECTAL CA SCREEN DOC REV: CPT | Performed by: PSYCHIATRY & NEUROLOGY

## 2025-02-03 PROCEDURE — 1036F TOBACCO NON-USER: CPT | Performed by: PSYCHIATRY & NEUROLOGY

## 2025-02-03 PROCEDURE — 99214 OFFICE O/P EST MOD 30 MIN: CPT | Performed by: PSYCHIATRY & NEUROLOGY

## 2025-02-03 RX ORDER — CARBIDOPA AND LEVODOPA 25; 100 MG/1; MG/1
1 TABLET, EXTENDED RELEASE ORAL 2 TIMES DAILY
Qty: 60 TABLET | Refills: 5 | Status: SHIPPED | OUTPATIENT
Start: 2025-02-03

## 2025-02-03 NOTE — PATIENT INSTRUCTIONS
Stop carbidopa levodopa  mg tablets.    Replace it with carbidopa levodopa EXTENDED RELEASE  mg tablets.  Take 1 tablet in the morning and 1 tablet in the evening around dinnertime.    My hope is that the new one is better tolerated.  We can make dosage adjustments if needed.

## 2025-02-10 NOTE — PROGRESS NOTES
[facility] [[email or web address]] CARDIOLOGY  [address] [place] [address], [address]  [place] [address]  PHONE: [phone]      25    NAME:  Jessenia Prakash  : 1950  MRN: [id number]         SUBJECTIVE:   Jessenia Prakash is a 74 y.o. female seen for a consultation visit regarding the following:     Chief Complaint   Patient presents with    Consultation    Chest Pain            HPI:  Consultation is requested by Dr Steve Locke for evaluation of Consultation and Chest Pain   .    Consult chest discomfort, ER visit 25. Patient with Parkinson's disease, minimal carotid artery disease (< 50%, followed by vascular).  ER workup was negative.  She had mentioned belching and constipation, ER provider addressed that but from abundance of caution made cardiology referral.     The pain she was having is sharp, comes and goes, just fleeting.  Denies any anginal quality discomfort.  Only feels short of breath \"when I have a panic attack\", which may happen a few times a year.  She uses a treadmill and a recumbent bike she uses about 3 times a week.  She does about 10 minutes at a time then has to rest, chronic fatigue.      As an aside, she mentions she feels she may pass out frequently, depending on how fast she gets up.  It resolves fairly quickly with sitting.  Has never actually passed out, always able to abort it.      She was put on metoprolol years ago for palpitations.      Her father  with MI in his early 70's, mother is living, 93.                   Cardiac Medications       Beta Blockers Cardio-Selective       metoprolol succinate (TOPROL XL) 25 MG extended release tablet Take 1.5 tablets by mouth daily       HMG CoA Reductase Inhibitors       pravastatin (PRAVACHOL) 20 MG tablet Take 1 tablet by mouth at bedtime       Salicylates       aspirin 81 MG chewable tablet Take 1 tablet by mouth daily                      Past Medical History, Past Surgical History, Family history, Social History, and

## 2025-02-11 ENCOUNTER — INITIAL CONSULT (OUTPATIENT)
Age: 75
End: 2025-02-11
Payer: MEDICARE

## 2025-02-11 VITALS
SYSTOLIC BLOOD PRESSURE: 118 MMHG | HEART RATE: 70 BPM | HEIGHT: 67 IN | BODY MASS INDEX: 19.93 KG/M2 | WEIGHT: 127 LBS | DIASTOLIC BLOOD PRESSURE: 78 MMHG

## 2025-02-11 DIAGNOSIS — I95.1 ORTHOSTATIC HYPOTENSION: Primary | ICD-10-CM

## 2025-02-11 DIAGNOSIS — G20.A1 PARKINSON'S DISEASE, UNSPECIFIED WHETHER DYSKINESIA PRESENT, UNSPECIFIED WHETHER MANIFESTATIONS FLUCTUATE (HCC): ICD-10-CM

## 2025-02-11 DIAGNOSIS — R07.89 CHEST WALL PAIN: ICD-10-CM

## 2025-02-11 DIAGNOSIS — E78.5 DYSLIPIDEMIA: ICD-10-CM

## 2025-02-11 PROCEDURE — G8420 CALC BMI NORM PARAMETERS: HCPCS | Performed by: INTERNAL MEDICINE

## 2025-02-11 PROCEDURE — 1160F RVW MEDS BY RX/DR IN RCRD: CPT | Performed by: INTERNAL MEDICINE

## 2025-02-11 PROCEDURE — 3017F COLORECTAL CA SCREEN DOC REV: CPT | Performed by: INTERNAL MEDICINE

## 2025-02-11 PROCEDURE — 1126F AMNT PAIN NOTED NONE PRSNT: CPT | Performed by: INTERNAL MEDICINE

## 2025-02-11 PROCEDURE — 1123F ACP DISCUSS/DSCN MKR DOCD: CPT | Performed by: INTERNAL MEDICINE

## 2025-02-11 PROCEDURE — 99204 OFFICE O/P NEW MOD 45 MIN: CPT | Performed by: INTERNAL MEDICINE

## 2025-02-11 PROCEDURE — G8399 PT W/DXA RESULTS DOCUMENT: HCPCS | Performed by: INTERNAL MEDICINE

## 2025-02-11 PROCEDURE — 1090F PRES/ABSN URINE INCON ASSESS: CPT | Performed by: INTERNAL MEDICINE

## 2025-02-11 PROCEDURE — G8427 DOCREV CUR MEDS BY ELIG CLIN: HCPCS | Performed by: INTERNAL MEDICINE

## 2025-02-11 PROCEDURE — 1036F TOBACCO NON-USER: CPT | Performed by: INTERNAL MEDICINE

## 2025-02-11 PROCEDURE — 1159F MED LIST DOCD IN RCRD: CPT | Performed by: INTERNAL MEDICINE

## 2025-02-11 NOTE — PATIENT INSTRUCTIONS
Patient Education        Chest Pain: Care Instructions  Overview     There are many things that can cause chest pain. Some are not serious and will get better on their own in a few days. But some kinds of chest pain need more testing and treatment. Your doctor may have recommended a follow-up visit in the next few days. If you are not getting better, you may need more tests or treatment.  Even though your doctor has released you, you still need to watch for any problems. The doctor carefully checked you, but sometimes problems can develop later. If you have new symptoms or if your symptoms do not get better, get medical care right away.  If you have worse or different chest pain or pressure that lasts more than 5 minutes or you passed out (lost consciousness), call 911 or seek other emergency help right away.   A medical visit is only one step in your treatment. Even if you feel better, you still need to do what your doctor recommends, such as going to all suggested follow-up appointments and taking medicines exactly as directed. This will help you recover and help prevent future problems.  How can you care for yourself at home?  Rest until you feel better.  Take your medicine exactly as prescribed. Call your doctor if you think you are having a problem with your medicine.  Do not drive after taking a prescription pain medicine.  When should you call for help?   Call 911 if:     You passed out (lost consciousness).     You have severe difficulty breathing.     You have symptoms of a heart attack. These may include:  Chest pain or pressure, or a strange feeling in your chest.  Sweating.  Shortness of breath.  Nausea or vomiting.  Pain, pressure, or a strange feeling in your back, neck, jaw, or upper belly or in one or both shoulders or arms.  Lightheadedness or sudden weakness.  A fast or irregular heartbeat.  After you call 911, the  may tell you to chew 1 adult-strength or 2 to 4 low-dose aspirin. Wait for

## 2025-02-12 ENCOUNTER — TELEPHONE (OUTPATIENT)
Dept: NEUROLOGY | Age: 75
End: 2025-02-12

## 2025-02-12 NOTE — TELEPHONE ENCOUNTER
Patient says the Sinemet CR is not working and she would like to go back to her old medication regimen. Please advise.

## 2025-02-13 NOTE — TELEPHONE ENCOUNTER
Spoke to patient.  Okay to return to her old Sinemet regimen.    We also discussed the possibility of increasing the Sinemet CR dose which may be more effective.

## 2025-03-11 ENCOUNTER — TELEPHONE (OUTPATIENT)
Age: 75
End: 2025-03-11

## 2025-03-11 ENCOUNTER — OFFICE VISIT (OUTPATIENT)
Age: 75
End: 2025-03-11
Payer: MEDICARE

## 2025-03-11 ENCOUNTER — PREP FOR PROCEDURE (OUTPATIENT)
Age: 75
End: 2025-03-11

## 2025-03-11 VITALS
OXYGEN SATURATION: 97 % | DIASTOLIC BLOOD PRESSURE: 68 MMHG | BODY MASS INDEX: 20 KG/M2 | WEIGHT: 127.4 LBS | HEART RATE: 88 BPM | RESPIRATION RATE: 16 BRPM | HEIGHT: 67 IN | SYSTOLIC BLOOD PRESSURE: 102 MMHG

## 2025-03-11 DIAGNOSIS — K59.00 CONSTIPATION, UNSPECIFIED CONSTIPATION TYPE: Primary | ICD-10-CM

## 2025-03-11 PROCEDURE — 1159F MED LIST DOCD IN RCRD: CPT | Performed by: PHYSICIAN ASSISTANT

## 2025-03-11 PROCEDURE — 3017F COLORECTAL CA SCREEN DOC REV: CPT | Performed by: PHYSICIAN ASSISTANT

## 2025-03-11 PROCEDURE — 1160F RVW MEDS BY RX/DR IN RCRD: CPT | Performed by: PHYSICIAN ASSISTANT

## 2025-03-11 PROCEDURE — G8399 PT W/DXA RESULTS DOCUMENT: HCPCS | Performed by: PHYSICIAN ASSISTANT

## 2025-03-11 PROCEDURE — 1090F PRES/ABSN URINE INCON ASSESS: CPT | Performed by: PHYSICIAN ASSISTANT

## 2025-03-11 PROCEDURE — 1123F ACP DISCUSS/DSCN MKR DOCD: CPT | Performed by: PHYSICIAN ASSISTANT

## 2025-03-11 PROCEDURE — G8420 CALC BMI NORM PARAMETERS: HCPCS | Performed by: PHYSICIAN ASSISTANT

## 2025-03-11 PROCEDURE — 1036F TOBACCO NON-USER: CPT | Performed by: PHYSICIAN ASSISTANT

## 2025-03-11 PROCEDURE — 99203 OFFICE O/P NEW LOW 30 MIN: CPT | Performed by: PHYSICIAN ASSISTANT

## 2025-03-11 PROCEDURE — G8427 DOCREV CUR MEDS BY ELIG CLIN: HCPCS | Performed by: PHYSICIAN ASSISTANT

## 2025-03-11 NOTE — TELEPHONE ENCOUNTER
Patient in office, scheduled for colonoscopy with Dr. Bryant on Monday 4/14/2025 at [facility]. Miralax prep instructions given to the patient in office.     Items to purchase at pharmacy 5 days before your procedure:  Dulcolax tablets-- (LAXATIVE ONLY- not stool softener)  238-gram bottle of MiraLAX  64 ounces Gatorade, Crystal Light, Apple juice (NO RED BLUE OR PURPLE IN COLOR)    The day before your procedure: Sunday 4/13/2025  Clear liquids only the entire day (water, coffee, tea, Sprite, 7-up, ginger ale, Jell-O, popsicles, Chicken/beef broth, apple juice, Gatorade) NO MILK, CREAMER, OR DAIRY PRODUCTS. NO SOLID FOODS. NOTHING RED, BLUE, OR PURPLE IN COLOR.  Mix Gatorade and MiraLAX together, shake the solution until the MiraLAX is dissolved. Chill before drinking.   Start at 5:00 pm Start drinking MiraLAX solution. Drink 8-ounce glass every 30 minutes. Until half gone. Put remainder in refrigerator.  At 5:00 pm take 2x Dulcolax tablets with 16 oz of water    NO MORE LIQUIDS AFTER MIDNIGHT (other than 2nd part of MiraLAX solution)    The day of your procedure: Monday 4/14/2025     6 HOURS PRIOR TO PROCEDURE: Drink the other half of your MiraLAX solution until gone.

## 2025-03-11 NOTE — PROGRESS NOTES
Jessenia Prakash (:  1950) is a 75 y.o. female new patient referred to our office for evaluation of the following chief complaint(s):  New Patient        Assessment & Plan   ASSESSMENT/PLAN:  1. Constipation, unspecified constipation type      Assessment & Plan    -Counseled on basic constipation measures. Suspect Parkinson's disease contributing to new onset constipation. Start daily Miralax. Schedule colonoscopy. Follow-up after procedure to review results and recheck symptoms.     Results      Return for scheduled colonoscopy, follow-up visit 1-2 weeks after procedure.         Subjective   SUBJECTIVE/OBJECTIVE  Jessenia Prakash is a 75 y.o. year old female referred to our office for evaluation of chronic constipation and colon cancer screening. Referral note reviewed from 2025.  Lab review on 2025 shows normal Hgb and LFTs. No prior GI or endoscopy records discovered on chart review.  History of Present Illness    She reports one prior colonoscopy > 10 years ago in [place] with no polyps removed. Patient denies family hx of GI malignancy or IBD. Smoked briefly as a teenager. Denies EtOH, OAC, frequent NSAID use except Aspirin 81 mg daily.     She reports new constipation over the past few months which she attributes to Parkinson's disease. Has been taking Sinemet, diagnosed with Parkinson's in 2020. Previously had daily bowel movement, now having more pebble-like stools with straining. She is pescatarian and gets a lot of fiber. Drinks around 60 oz water daily. Denies melena, hematochezia, abdominal pain, nausea, vomiting, acid reflux, indigestion, heartburn, dysphagia, odynophagia. Reports decreased appetite. She is taking Benefiber BID. She has used Miralax once when constipation was at it's worst - it helped.     Past Medical History:   Diagnosis Date    Anxiety     GERD (gastroesophageal reflux disease)     Glaucoma     Hepatitis C     s/p 2 years interferon    Ill-defined condition

## 2025-03-12 ENCOUNTER — TELEPHONE (OUTPATIENT)
Age: 75
End: 2025-03-12

## 2025-03-12 RX ORDER — SODIUM CHLORIDE 0.9 % (FLUSH) 0.9 %
5-40 SYRINGE (ML) INJECTION PRN
Status: CANCELLED | OUTPATIENT
Start: 2025-03-12

## 2025-03-12 RX ORDER — SODIUM CHLORIDE 9 MG/ML
25 INJECTION, SOLUTION INTRAVENOUS PRN
Status: CANCELLED | OUTPATIENT
Start: 2025-03-12

## 2025-03-12 RX ORDER — SODIUM CHLORIDE 0.9 % (FLUSH) 0.9 %
5-40 SYRINGE (ML) INJECTION EVERY 12 HOURS SCHEDULED
Status: CANCELLED | OUTPATIENT
Start: 2025-03-12

## 2025-03-12 NOTE — TELEPHONE ENCOUNTER
Cardiac Clearance        Physician or Practice Requesting:Manny MEDINA MD  : Bri  Contact Phone Number: [phone]   Fax Number: put it in epic  Date of Surgery/Procedure: 4/14/25  Type of Surgery or Procedure: colon  Type of Anesthesia: general  Type of Clearance Requested: Cardiac Clearance and Medication Hold  Medication to Hold:  Days to Hold:

## 2025-04-01 DIAGNOSIS — G20.A1 PARKINSON'S DISEASE WITHOUT DYSKINESIA OR FLUCTUATING MANIFESTATIONS (HCC): Primary | ICD-10-CM

## 2025-04-01 RX ORDER — CARBIDOPA AND LEVODOPA 25; 100 MG/1; MG/1
1 TABLET ORAL 3 TIMES DAILY
Qty: 270 TABLET | Refills: 3 | Status: SHIPPED | OUTPATIENT
Start: 2025-04-01

## 2025-04-09 RX ORDER — POLYETHYLENE GLYCOL 3350 17 G/17G
17 POWDER, FOR SOLUTION ORAL DAILY PRN
COMMUNITY

## 2025-04-09 RX ORDER — WHEAT DEXTRIN 3 G/3.8 G
4 POWDER (GRAM) ORAL 2 TIMES DAILY
COMMUNITY

## 2025-04-09 RX ORDER — ALPRAZOLAM 0.25 MG
0.25 TABLET ORAL AS NEEDED
COMMUNITY

## 2025-04-09 NOTE — PERIOP NOTE
Patient verified name, , and procedure.    Type: 1a; abbreviated assessment per anesthesia guidelines    Labs per anesthesia: none.    Cardiac Clearance- Low Risk. Pt has orthostatic hypotension- monitor carefully-per Dr Jonnie Alexander.- noted in EHR date 3.12.25.    Instructed pt that they will be notified the day before their procedure by the GI Lab for time of arrival if their procedure is [place] and Pre-op for [place] cases. Arrival times should be called by 5 pm. If no phone is received the patient should contact their respective hospital. The GI lab telephone number is [phone] and ES Pre-op is [phone].     Follow diet and prep instructions per office. May have clear liquids up to 2 hours prior to hospital arrive time.      Bath or shower the night before and the am of surgery with non-moisturizing soap. No lotions, oils, powders, cologne on skin. No make up, eye make up or jewelry. Wear loose fitting comfortable, clean clothing.     Must have adult present in building the entire time .     Medications for the day of procedure: Xanax as instructed if needed and Sinemet, patient to hold multivitamin, Vitamin D, and Calcium per anesthesia guidelines.     The following discharge instructions reviewed with patient: medication given during procedure may cause drowsiness for several hours, therefore, do not drive or operate machinery for remainder of the day. You may not drink alcohol on the day of your procedure, please resume regular diet and activity unless otherwise directed. You may experience abdominal distention for several hours that is relieved by the passage of gas. Contact your physician if you have any of the following: fever or chills, severe abdominal pain or excessive amount of bleeding or a large amount when having a bowel movement. Occasional specks of blood with bowel movement would not be unusual.

## 2025-04-11 ENCOUNTER — TELEPHONE (OUTPATIENT)
Dept: INTERNAL MEDICINE CLINIC | Facility: CLINIC | Age: 75
End: 2025-04-11

## 2025-04-11 DIAGNOSIS — E78.5 DYSLIPIDEMIA: Primary | ICD-10-CM

## 2025-04-11 NOTE — TELEPHONE ENCOUNTER
----- Message from RADHA BLOOD LPN sent at 4/11/2025 12:08 PM EDT -----  This pt is scheduled for labs on April 18th, but has no orders in her chart. Can you please place orders? Thanks!

## 2025-04-11 NOTE — PROGRESS NOTES
RN left message with Pt to confirm appointment time of 0815, arrival time 0645, location,  requirement, and instructions for registration at the hospital.

## 2025-04-11 NOTE — TELEPHONE ENCOUNTER
Orders Placed This Encounter    CBC with Auto Differential     Standing Status:   Future     Expected Date:   4/11/2025     Expiration Date:   4/11/2026    Comprehensive Metabolic Panel     Standing Status:   Future     Expected Date:   4/11/2025     Expiration Date:   10/11/2025    Lipid Panel     Standing Status:   Future     Expected Date:   4/11/2025     Expiration Date:   10/11/2025    TSH     Standing Status:   Future     Expected Date:   4/11/2025     Expiration Date:   4/11/2026

## 2025-04-14 ENCOUNTER — ANESTHESIA EVENT (OUTPATIENT)
Dept: ENDOSCOPY | Age: 75
End: 2025-04-14
Payer: MEDICARE

## 2025-04-14 ENCOUNTER — TELEPHONE (OUTPATIENT)
Dept: GASTROENTEROLOGY | Age: 75
End: 2025-04-14

## 2025-04-14 ENCOUNTER — HOSPITAL ENCOUNTER (OUTPATIENT)
Age: 75
Discharge: HOME | End: 2025-04-14
Attending: STUDENT IN AN ORGANIZED HEALTH CARE EDUCATION/TRAINING PROGRAM | Admitting: STUDENT IN AN ORGANIZED HEALTH CARE EDUCATION/TRAINING PROGRAM
Payer: MEDICARE

## 2025-04-14 ENCOUNTER — ANESTHESIA (OUTPATIENT)
Dept: ENDOSCOPY | Age: 75
End: 2025-04-14
Payer: MEDICARE

## 2025-04-14 VITALS
BODY MASS INDEX: 19.15 KG/M2 | HEART RATE: 75 BPM | TEMPERATURE: 98.1 F | OXYGEN SATURATION: 98 % | HEIGHT: 67 IN | SYSTOLIC BLOOD PRESSURE: 115 MMHG | WEIGHT: 122 LBS | DIASTOLIC BLOOD PRESSURE: 56 MMHG | RESPIRATION RATE: 20 BRPM

## 2025-04-14 PROCEDURE — G0121 COLON CA SCRN NOT HI RSK IND: HCPCS | Performed by: STUDENT IN AN ORGANIZED HEALTH CARE EDUCATION/TRAINING PROGRAM

## 2025-04-14 PROCEDURE — 2580000003 HC RX 258: Performed by: STUDENT IN AN ORGANIZED HEALTH CARE EDUCATION/TRAINING PROGRAM

## 2025-04-14 PROCEDURE — 2709999900 HC NON-CHARGEABLE SUPPLY: Performed by: STUDENT IN AN ORGANIZED HEALTH CARE EDUCATION/TRAINING PROGRAM

## 2025-04-14 PROCEDURE — 6360000002 HC RX W HCPCS: Performed by: ANESTHESIOLOGY

## 2025-04-14 PROCEDURE — 3700000000 HC ANESTHESIA ATTENDED CARE: Performed by: STUDENT IN AN ORGANIZED HEALTH CARE EDUCATION/TRAINING PROGRAM

## 2025-04-14 PROCEDURE — 6360000002 HC RX W HCPCS: Performed by: STUDENT IN AN ORGANIZED HEALTH CARE EDUCATION/TRAINING PROGRAM

## 2025-04-14 PROCEDURE — 3700000001 HC ADD 15 MINUTES (ANESTHESIA): Performed by: STUDENT IN AN ORGANIZED HEALTH CARE EDUCATION/TRAINING PROGRAM

## 2025-04-14 PROCEDURE — 3609027000 HC COLONOSCOPY: Performed by: STUDENT IN AN ORGANIZED HEALTH CARE EDUCATION/TRAINING PROGRAM

## 2025-04-14 PROCEDURE — 7100000011 HC PHASE II RECOVERY - ADDTL 15 MIN: Performed by: STUDENT IN AN ORGANIZED HEALTH CARE EDUCATION/TRAINING PROGRAM

## 2025-04-14 PROCEDURE — 7100000010 HC PHASE II RECOVERY - FIRST 15 MIN: Performed by: STUDENT IN AN ORGANIZED HEALTH CARE EDUCATION/TRAINING PROGRAM

## 2025-04-14 RX ORDER — PHENYLEPHRINE HYDROCHLORIDE 10 MG/ML
INJECTION INTRAVENOUS
Status: DISCONTINUED | OUTPATIENT
Start: 2025-04-14 | End: 2025-04-14 | Stop reason: SDUPTHER

## 2025-04-14 RX ORDER — SODIUM CHLORIDE 9 MG/ML
25 INJECTION, SOLUTION INTRAVENOUS PRN
Status: DISCONTINUED | OUTPATIENT
Start: 2025-04-14 | End: 2025-04-14 | Stop reason: HOSPADM

## 2025-04-14 RX ORDER — MIDAZOLAM HYDROCHLORIDE 1 MG/ML
1 INJECTION, SOLUTION INTRAMUSCULAR; INTRAVENOUS
Status: COMPLETED | OUTPATIENT
Start: 2025-04-14 | End: 2025-04-14

## 2025-04-14 RX ORDER — PROPOFOL 10 MG/ML
INJECTION, EMULSION INTRAVENOUS
Status: DISCONTINUED | OUTPATIENT
Start: 2025-04-14 | End: 2025-04-14 | Stop reason: SDUPTHER

## 2025-04-14 RX ORDER — SODIUM CHLORIDE 0.9 % (FLUSH) 0.9 %
5-40 SYRINGE (ML) INJECTION EVERY 12 HOURS SCHEDULED
Status: DISCONTINUED | OUTPATIENT
Start: 2025-04-14 | End: 2025-04-14 | Stop reason: HOSPADM

## 2025-04-14 RX ORDER — LIDOCAINE HYDROCHLORIDE 20 MG/ML
INJECTION, SOLUTION EPIDURAL; INFILTRATION; INTRACAUDAL; PERINEURAL
Status: DISCONTINUED | OUTPATIENT
Start: 2025-04-14 | End: 2025-04-14 | Stop reason: SDUPTHER

## 2025-04-14 RX ORDER — SODIUM CHLORIDE 0.9 % (FLUSH) 0.9 %
5-40 SYRINGE (ML) INJECTION PRN
Status: DISCONTINUED | OUTPATIENT
Start: 2025-04-14 | End: 2025-04-14 | Stop reason: HOSPADM

## 2025-04-14 RX ORDER — SODIUM CHLORIDE, SODIUM LACTATE, POTASSIUM CHLORIDE, CALCIUM CHLORIDE 600; 310; 30; 20 MG/100ML; MG/100ML; MG/100ML; MG/100ML
INJECTION, SOLUTION INTRAVENOUS
Status: DISCONTINUED | OUTPATIENT
Start: 2025-04-14 | End: 2025-04-14 | Stop reason: SDUPTHER

## 2025-04-14 RX ADMIN — PROPOFOL 25 MG: 10 INJECTION, EMULSION INTRAVENOUS at 08:38

## 2025-04-14 RX ADMIN — PROPOFOL 70 MG: 10 INJECTION, EMULSION INTRAVENOUS at 08:35

## 2025-04-14 RX ADMIN — MIDAZOLAM 1 MG: 1 INJECTION INTRAMUSCULAR; INTRAVENOUS at 07:50

## 2025-04-14 RX ADMIN — PHENYLEPHRINE HYDROCHLORIDE 100 MCG: 10 INJECTION INTRAVENOUS at 08:43

## 2025-04-14 RX ADMIN — SODIUM CHLORIDE, SODIUM LACTATE, POTASSIUM CHLORIDE, AND CALCIUM CHLORIDE: 600; 310; 30; 20 INJECTION, SOLUTION INTRAVENOUS at 08:29

## 2025-04-14 RX ADMIN — PROPOFOL 30 MG: 10 INJECTION, EMULSION INTRAVENOUS at 08:46

## 2025-04-14 RX ADMIN — LIDOCAINE HYDROCHLORIDE 60 MG: 20 INJECTION, SOLUTION EPIDURAL; INFILTRATION; INTRACAUDAL; PERINEURAL at 08:33

## 2025-04-14 RX ADMIN — PHENYLEPHRINE HYDROCHLORIDE 200 MCG: 10 INJECTION INTRAVENOUS at 08:51

## 2025-04-14 RX ADMIN — PROPOFOL 25 MG: 10 INJECTION, EMULSION INTRAVENOUS at 08:41

## 2025-04-14 ASSESSMENT — PAIN - FUNCTIONAL ASSESSMENT: PAIN_FUNCTIONAL_ASSESSMENT: 0-10

## 2025-04-14 NOTE — H&P
Jessenia Prakash (:  1950) is a 75 y.o. female new patient referred to our office for evaluation of the following chief complaint(s):  New Patient           Assessment & Plan  ASSESSMENT/PLAN:  1. Constipation, unspecified constipation type        Assessment & Plan     -Counseled on basic constipation measures. Suspect Parkinson's disease contributing to new onset constipation. Start daily Miralax. Schedule colonoscopy. Follow-up after procedure to review results and recheck symptoms.      Results        Return for scheduled colonoscopy, follow-up visit 1-2 weeks after procedure.           Subjective  SUBJECTIVE/OBJECTIVE  Jessenia Prakash is a 75 y.o. year old female referred to our office for evaluation of chronic constipation and colon cancer screening. Referral note reviewed from 2025.  Lab review on 2025 shows normal Hgb and LFTs. No prior GI or endoscopy records discovered on chart review.  History of Present Illness     She reports one prior colonoscopy > 10 years ago in [place] with no polyps removed. Patient denies family hx of GI malignancy or IBD. Smoked briefly as a teenager. Denies EtOH, OAC, frequent NSAID use except Aspirin 81 mg daily.      She reports new constipation over the past few months which she attributes to Parkinson's disease. Has been taking Sinemet, diagnosed with Parkinson's in 2020. Previously had daily bowel movement, now having more pebble-like stools with straining. She is pescatarian and gets a lot of fiber. Drinks around 60 oz water daily. Denies melena, hematochezia, abdominal pain, nausea, vomiting, acid reflux, indigestion, heartburn, dysphagia, odynophagia. Reports decreased appetite. She is taking Benefiber BID. She has used Miralax once when constipation was at it's worst - it helped.      Past Medical History        Past Medical History:   Diagnosis Date    Anxiety      GERD (gastroesophageal reflux disease)      Glaucoma      Hepatitis C       s/p 2

## 2025-04-14 NOTE — ANESTHESIA PRE PROCEDURE
Department of Anesthesiology  Preprocedure Note       Name:  Jesseina Prakash   Age:  75 y.o.  :  1950                                          MRN:  [id number]         Date:  2025      Surgeon: Surgeon(s):  David Bryant MD    Procedure: Procedure(s):  COLORECTAL CANCER SCREENING, NOT HIGH RISK    Medications prior to admission:   Prior to Admission medications    Medication Sig Start Date End Date Taking? Authorizing Provider   docusate (COLACE) 50 MG/5ML liquid Take 5 mLs by mouth 2 times daily   Yes Ivelisse Arzola MD   Multiple Vitamin (MULTIVITAMIN PO) Take 1 tablet by mouth daily   Yes Ivelisse Arzola MD   Wheat Dextrin (BENEFIBER) POWD Take 4 g by mouth in the morning and at bedtime   Yes Ivelisse Arzola MD   ALPRAZolam (XANAX) 0.25 MG tablet Take 1 tablet by mouth as needed.   Yes Ivelisse Arzola MD   polyethylene glycol (GLYCOLAX) 17 g packet Take 1 packet by mouth daily as needed for Constipation   Yes Ivelisse Arzola MD   carbidopa-levodopa (SINEMET)  MG per tablet Take 1 tablet by mouth 3 times daily 25  Yes Mitchell Barkley MD   pravastatin (PRAVACHOL) 20 MG tablet Take 1 tablet by mouth at bedtime 24  Yes Jermaine Barkley DO   metoprolol succinate (TOPROL XL) 25 MG extended release tablet Take 1.5 tablets by mouth daily  Patient taking differently: Take by mouth every evening Take 1.5 tablets by mouth daily 24  Yes Jermaine Barkley DO   aspirin 81 MG chewable tablet Take 1 tablet by mouth daily 3/17/20  Yes Automatic Reconciliation, Ar   calcium carbonate (OYSTER SHELL CALCIUM 500 MG) 1250 (500 Ca) MG tablet Take by mouth daily   Yes Automatic Reconciliation, Ar   Cholecalciferol 50 MCG (2000 UT) TABS Take 1 tablet by mouth daily   Yes Automatic Reconciliation, Ar   sucralfate (CARAFATE) 1 GM tablet Take 1 tablet by mouth 4 times daily  Patient not taking: Reported on 2025   Steve Locke,    latanoprost

## 2025-04-14 NOTE — ANESTHESIA POSTPROCEDURE EVALUATION
Department of Anesthesiology  Postprocedure Note    Patient: Jessenia Prakash  MRN: [id number]  YOB: 1950  Date of evaluation: 4/14/2025    Procedure Summary       Date: 04/14/25 Room / Location: [facility] 04 / [facility] ENDOSCOPY    Anesthesia Start: 0829 Anesthesia Stop: 0855    Procedure: COLORECTAL CANCER SCREENING, NOT HIGH RISK Diagnosis:       Constipation, unspecified constipation type      (Constipation, unspecified constipation type [K59.00])    Surgeons: David Bryant MD Responsible Provider: Arsalan Blanco MD    Anesthesia Type: TIVA ASA Status: 3            Anesthesia Type: No value filed.    Owen Phase I: Owen Score: 10    Owen Phase II: Owen Score: 10    Anesthesia Post Evaluation    Patient location during evaluation: PACU  Patient participation: complete - patient participated  Level of consciousness: awake and alert  Airway patency: patent  Nausea & Vomiting: no nausea and no vomiting  Cardiovascular status: hemodynamically stable  Respiratory status: acceptable, nonlabored ventilation and spontaneous ventilation  Hydration status: euvolemic  Comments: BP (!) 115/56   Pulse 75   Temp 98.1 °F (36.7 °C) (Skin)   Resp 20   Ht 1.702 m (5' 7\")   Wt 55.3 kg (122 lb)   SpO2 98%   BMI 19.11 kg/m²     Multimodal analgesia pain management approach  Pain management: adequate and satisfactory to patient    No notable events documented.

## 2025-04-14 NOTE — TELEPHONE ENCOUNTER
Called pt and attempted to leave voicemail advising them to return call to schedule follow up office visit with PA, Melissa Grinnell per Dr. Bryant's procedure note. Pt's voicemail box was full. Sent [email or web address] message.     2 = A lot of assistance

## 2025-04-14 NOTE — DISCHARGE INSTRUCTIONS
[employer] disclaims any warranty or liability for your use of this information.

## 2025-04-14 NOTE — PROGRESS NOTES
Per Dr. Blanco, Pt was instructed to take home medication of Sinemet 25-100mg oral.  Pt administered medication from home supply at bedside.

## 2025-04-18 ENCOUNTER — LAB (OUTPATIENT)
Dept: INTERNAL MEDICINE CLINIC | Facility: CLINIC | Age: 75
End: 2025-04-18

## 2025-04-18 ENCOUNTER — RESULTS FOLLOW-UP (OUTPATIENT)
Dept: INTERNAL MEDICINE CLINIC | Facility: CLINIC | Age: 75
End: 2025-04-18

## 2025-04-18 DIAGNOSIS — E78.5 DYSLIPIDEMIA: ICD-10-CM

## 2025-04-18 LAB
ALBUMIN SERPL-MCNC: 4.1 G/DL (ref 3.2–4.6)
ALBUMIN/GLOB SERPL: 1.4 (ref 1–1.9)
ALP SERPL-CCNC: 56 U/L (ref 35–104)
ALT SERPL-CCNC: 5 U/L (ref 8–45)
ANION GAP SERPL CALC-SCNC: 12 MMOL/L (ref 7–16)
AST SERPL-CCNC: 27 U/L (ref 15–37)
BASOPHILS # BLD: 0.04 K/UL (ref 0–0.2)
BASOPHILS NFR BLD: 1 % (ref 0–2)
BILIRUB SERPL-MCNC: 1.2 MG/DL (ref 0–1.2)
BUN SERPL-MCNC: 8 MG/DL (ref 8–23)
CALCIUM SERPL-MCNC: 9.9 MG/DL (ref 8.8–10.2)
CHLORIDE SERPL-SCNC: 101 MMOL/L (ref 98–107)
CHOLEST SERPL-MCNC: 141 MG/DL (ref 0–200)
CO2 SERPL-SCNC: 25 MMOL/L (ref 20–29)
CREAT SERPL-MCNC: 0.9 MG/DL (ref 0.6–1.1)
DIFFERENTIAL METHOD BLD: ABNORMAL
EOSINOPHIL # BLD: 0.13 K/UL (ref 0–0.8)
EOSINOPHIL NFR BLD: 3.3 % (ref 0.5–7.8)
ERYTHROCYTE [DISTWIDTH] IN BLOOD BY AUTOMATED COUNT: 12.7 % (ref 11.9–14.6)
GLOBULIN SER CALC-MCNC: 3 G/DL (ref 2.3–3.5)
GLUCOSE SERPL-MCNC: 93 MG/DL (ref 70–99)
HCT VFR BLD AUTO: 41.4 % (ref 35.8–46.3)
HDLC SERPL-MCNC: 63 MG/DL (ref 40–60)
HDLC SERPL: 2.2 (ref 0–5)
HGB BLD-MCNC: 14.1 G/DL (ref 11.7–15.4)
IMM GRANULOCYTES # BLD AUTO: 0.01 K/UL (ref 0–0.5)
IMM GRANULOCYTES NFR BLD AUTO: 0.3 % (ref 0–5)
LDLC SERPL CALC-MCNC: 65 MG/DL (ref 0–100)
LYMPHOCYTES # BLD: 0.84 K/UL (ref 0.5–4.6)
LYMPHOCYTES NFR BLD: 21.3 % (ref 13–44)
MCH RBC QN AUTO: 30.1 PG (ref 26.1–32.9)
MCHC RBC AUTO-ENTMCNC: 34.1 G/DL (ref 31.4–35)
MCV RBC AUTO: 88.5 FL (ref 82–102)
MONOCYTES # BLD: 0.32 K/UL (ref 0.1–1.3)
MONOCYTES NFR BLD: 8.1 % (ref 4–12)
NEUTS SEG # BLD: 2.6 K/UL (ref 1.7–8.2)
NEUTS SEG NFR BLD: 66 % (ref 43–78)
NRBC # BLD: 0 K/UL (ref 0–0.2)
PLATELET # BLD AUTO: 264 K/UL (ref 150–450)
PMV BLD AUTO: 10.5 FL (ref 9.4–12.3)
POTASSIUM SERPL-SCNC: 4.1 MMOL/L (ref 3.5–5.1)
PROT SERPL-MCNC: 7.1 G/DL (ref 6.3–8.2)
RBC # BLD AUTO: 4.68 M/UL (ref 4.05–5.2)
SODIUM SERPL-SCNC: 137 MMOL/L (ref 136–145)
TRIGL SERPL-MCNC: 63 MG/DL (ref 0–150)
TSH, 3RD GENERATION: 1.34 UIU/ML (ref 0.27–4.2)
VLDLC SERPL CALC-MCNC: 13 MG/DL (ref 6–23)
WBC # BLD AUTO: 3.9 K/UL (ref 4.3–11.1)

## 2025-04-20 NOTE — ASSESSMENT & PLAN NOTE
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

## 2025-04-20 NOTE — ASSESSMENT & PLAN NOTE
Echo from 3/16/2020 with EF of 55 to 60%, no right to left shunting, no valvulopathy or pericardial effusion   Seen by cardiology earlier this year for chest pain felt to be noncardiac in nature  Cardiology had discussed with patient weaning down beta-blocker but patient was reluctant as in the past this was met with worsening tremors  Recent labs with normal TSH and electrolytes  Continue metoprolol succinate  Orders:    metoprolol succinate (TOPROL XL) 25 MG extended release tablet; Take 1.5 tablets by mouth daily

## 2025-04-20 NOTE — PROGRESS NOTES
Jessenia Prakash (:  1950) is a 75 y.o. 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  Discussed role of maintenance medication such as maintenance antianxiety/antidepressant and how it can be used in addition to rescue medicine such as her alprazolam  I will reach out to referral coordinator about status of last psychiatry referral that was placed  Counseled patient to seek immediate medical attention such as at the emergency room or [facility] behavioral health with worsening mood symptoms including new onset suicidal homicidal ideations  Currently using alprazolam up to once a day sporadically as needed  PDMP reviewed showing alprazolam 0.25 mg tablets, quantity #60, 30-day supply last filled on 2024.  Over the years patient has been on various benzodiazepines.  Most recently was prescribed diazepam by a psychiatrist and was filled on 2024.  However at visit on 2024 patient states she did not take out of concern for side effects.     Orders:    ALPRAZolam (XANAX) 0.25 MG tablet; Take 1 tablet by mouth daily as needed for

## 2025-04-25 ENCOUNTER — OFFICE VISIT (OUTPATIENT)
Dept: INTERNAL MEDICINE CLINIC | Facility: CLINIC | Age: 75
End: 2025-04-25

## 2025-04-25 VITALS
HEART RATE: 85 BPM | TEMPERATURE: 97.2 F | SYSTOLIC BLOOD PRESSURE: 108 MMHG | OXYGEN SATURATION: 99 % | DIASTOLIC BLOOD PRESSURE: 60 MMHG | BODY MASS INDEX: 19.15 KG/M2 | HEIGHT: 67 IN | WEIGHT: 122 LBS

## 2025-04-25 DIAGNOSIS — J30.9 ALLERGIC RHINITIS, UNSPECIFIED SEASONALITY, UNSPECIFIED TRIGGER: ICD-10-CM

## 2025-04-25 DIAGNOSIS — F32.A ANXIETY AND DEPRESSION: ICD-10-CM

## 2025-04-25 DIAGNOSIS — K59.09 CHRONIC CONSTIPATION: ICD-10-CM

## 2025-04-25 DIAGNOSIS — G20.A1 PARKINSON'S DISEASE WITHOUT DYSKINESIA OR FLUCTUATING MANIFESTATIONS (HCC): Primary | ICD-10-CM

## 2025-04-25 DIAGNOSIS — R00.2 PALPITATIONS: ICD-10-CM

## 2025-04-25 DIAGNOSIS — F41.9 ANXIETY AND DEPRESSION: ICD-10-CM

## 2025-04-25 DIAGNOSIS — I67.2 INTRACRANIAL ATHEROSCLEROSIS: ICD-10-CM

## 2025-04-25 RX ORDER — ALPRAZOLAM 0.25 MG
0.25 TABLET ORAL DAILY PRN
Qty: 30 TABLET | Refills: 1 | Status: SHIPPED | OUTPATIENT
Start: 2025-04-25 | End: 2025-06-24

## 2025-04-25 RX ORDER — METOPROLOL SUCCINATE 25 MG/1
TABLET, EXTENDED RELEASE ORAL
Qty: 135 TABLET | Refills: 2 | Status: SHIPPED | OUTPATIENT
Start: 2025-04-25

## 2025-04-25 RX ORDER — PRAVASTATIN SODIUM 20 MG
20 TABLET ORAL NIGHTLY
Qty: 90 TABLET | Refills: 2 | Status: SHIPPED | OUTPATIENT
Start: 2025-04-25

## 2025-04-25 SDOH — ECONOMIC STABILITY: FOOD INSECURITY: WITHIN THE PAST 12 MONTHS, THE FOOD YOU BOUGHT JUST DIDN'T LAST AND YOU DIDN'T HAVE MONEY TO GET MORE.: NEVER TRUE

## 2025-04-25 SDOH — ECONOMIC STABILITY: FOOD INSECURITY: WITHIN THE PAST 12 MONTHS, YOU WORRIED THAT YOUR FOOD WOULD RUN OUT BEFORE YOU GOT MONEY TO BUY MORE.: NEVER TRUE

## 2025-04-25 ASSESSMENT — PATIENT HEALTH QUESTIONNAIRE - PHQ9
SUM OF ALL RESPONSES TO PHQ QUESTIONS 1-9: 12
3. TROUBLE FALLING OR STAYING ASLEEP: NEARLY EVERY DAY
SUM OF ALL RESPONSES TO PHQ QUESTIONS 1-9: 12
7. TROUBLE CONCENTRATING ON THINGS, SUCH AS READING THE NEWSPAPER OR WATCHING TELEVISION: NOT AT ALL
4. FEELING TIRED OR HAVING LITTLE ENERGY: NEARLY EVERY DAY
6. FEELING BAD ABOUT YOURSELF - OR THAT YOU ARE A FAILURE OR HAVE LET YOURSELF OR YOUR FAMILY DOWN: NOT AT ALL
2. FEELING DOWN, DEPRESSED OR HOPELESS: NOT AT ALL
5. POOR APPETITE OR OVEREATING: NEARLY EVERY DAY
1. LITTLE INTEREST OR PLEASURE IN DOING THINGS: NEARLY EVERY DAY
8. MOVING OR SPEAKING SO SLOWLY THAT OTHER PEOPLE COULD HAVE NOTICED. OR THE OPPOSITE, BEING SO FIGETY OR RESTLESS THAT YOU HAVE BEEN MOVING AROUND A LOT MORE THAN USUAL: NOT AT ALL
9. THOUGHTS THAT YOU WOULD BE BETTER OFF DEAD, OR OF HURTING YOURSELF: NOT AT ALL
SUM OF ALL RESPONSES TO PHQ QUESTIONS 1-9: 12
10. IF YOU CHECKED OFF ANY PROBLEMS, HOW DIFFICULT HAVE THESE PROBLEMS MADE IT FOR YOU TO DO YOUR WORK, TAKE CARE OF THINGS AT HOME, OR GET ALONG WITH OTHER PEOPLE: SOMEWHAT DIFFICULT
SUM OF ALL RESPONSES TO PHQ QUESTIONS 1-9: 12

## 2025-04-25 ASSESSMENT — ENCOUNTER SYMPTOMS
BLOOD IN STOOL: 0
ANAL BLEEDING: 0
CONSTIPATION: 1
WHEEZING: 0

## 2025-05-08 ENCOUNTER — OFFICE VISIT (OUTPATIENT)
Age: 75
End: 2025-05-08
Payer: MEDICARE

## 2025-05-08 VITALS
WEIGHT: 122 LBS | HEART RATE: 61 BPM | OXYGEN SATURATION: 96 % | BODY MASS INDEX: 19.15 KG/M2 | HEIGHT: 67 IN | SYSTOLIC BLOOD PRESSURE: 104 MMHG | DIASTOLIC BLOOD PRESSURE: 59 MMHG

## 2025-05-08 DIAGNOSIS — K59.01 SLOW TRANSIT CONSTIPATION: Primary | ICD-10-CM

## 2025-05-08 PROCEDURE — 99213 OFFICE O/P EST LOW 20 MIN: CPT | Performed by: PHYSICIAN ASSISTANT

## 2025-05-08 PROCEDURE — 1160F RVW MEDS BY RX/DR IN RCRD: CPT | Performed by: PHYSICIAN ASSISTANT

## 2025-05-08 PROCEDURE — G8427 DOCREV CUR MEDS BY ELIG CLIN: HCPCS | Performed by: PHYSICIAN ASSISTANT

## 2025-05-08 PROCEDURE — G8420 CALC BMI NORM PARAMETERS: HCPCS | Performed by: PHYSICIAN ASSISTANT

## 2025-05-08 PROCEDURE — G8399 PT W/DXA RESULTS DOCUMENT: HCPCS | Performed by: PHYSICIAN ASSISTANT

## 2025-05-08 PROCEDURE — 1159F MED LIST DOCD IN RCRD: CPT | Performed by: PHYSICIAN ASSISTANT

## 2025-05-08 PROCEDURE — 3017F COLORECTAL CA SCREEN DOC REV: CPT | Performed by: PHYSICIAN ASSISTANT

## 2025-05-08 PROCEDURE — 1123F ACP DISCUSS/DSCN MKR DOCD: CPT | Performed by: PHYSICIAN ASSISTANT

## 2025-05-08 PROCEDURE — 1036F TOBACCO NON-USER: CPT | Performed by: PHYSICIAN ASSISTANT

## 2025-05-08 PROCEDURE — 1090F PRES/ABSN URINE INCON ASSESS: CPT | Performed by: PHYSICIAN ASSISTANT

## 2025-05-08 NOTE — PROGRESS NOTES
Jessenia Prakash (:  1950) is a 75 y.o. female established patient following up today for the chief complaint(s):  Constipation (F/u colo)        Assessment & Plan   ASSESSMENT/PLAN:  1. Slow transit constipation      Assessment & Plan       -Reviewed colonoscopy results;  no polyps or masses. Suspect constipation secondary to Parkinson's/sinemet use. She has daily bowel function if she takes Miralax 1 capful daily. Reassured okay to take daily and can titrate dose up or down as needed for regular, soft, formed stools. Can add fiber supplement PRN for stool bulking if stools are loose. Continue stool softener if stools are hard.   -Follow-up as needed if constipation worsens or is not well controlled with OTC bowel regimen; handout provided.    Subjective   SUBJECTIVE/OBJECTIVE  Jessenia Prakash is a 75 y.o. year old female with PMH pertinent for Parkinson's disease, GERD, constipation, NEIL history of hepatitis C, anxiety, glaucoma, neuropathy. Surgical history is pertinent for cholecystectomy and hysterectomy.  She denied family history of GI malignancy or IBD.    Pertinent evaluation to-date includes:     - Colonoscopy 2025 (Dr. Bryant): [place] score 9, sigmoid diverticulosis, small internal hemorrhoids, no polyps    Patient was initially evaluated in my office 3/11/2025 for a several month hx of constipation and colon cancer screening.  Symptoms attributed to Parkinson's disease, diagnosed in , treated with Sinemet.  She had 1 prior colonoscopy more than 10 years ago in [place] that was reportedly normal.  We discussed basic constipation measures and recommended daily MiraLAX use.  Colonoscopy was arranged (see above).    Patient presents today for routine follow-up after colonoscopy.     History of Present Illness    She started Miralax daily which was working and allowing daily bowel movement, more loose stool. She weaned to 1/3 cap but this is less effective. Feels pretty bad for about an

## 2025-05-28 ENCOUNTER — APPOINTMENT (OUTPATIENT)
Dept: CT IMAGING | Age: 75
End: 2025-05-28
Payer: MEDICARE

## 2025-05-28 ENCOUNTER — HOSPITAL ENCOUNTER (EMERGENCY)
Age: 75
Discharge: HOME | End: 2025-05-28
Payer: MEDICARE

## 2025-05-28 ENCOUNTER — TELEPHONE (OUTPATIENT)
Dept: NEUROLOGY | Age: 75
End: 2025-05-28

## 2025-05-28 ENCOUNTER — APPOINTMENT (OUTPATIENT)
Dept: GENERAL RADIOLOGY | Age: 75
End: 2025-05-28
Payer: MEDICARE

## 2025-05-28 VITALS
SYSTOLIC BLOOD PRESSURE: 112 MMHG | DIASTOLIC BLOOD PRESSURE: 52 MMHG | WEIGHT: 120 LBS | OXYGEN SATURATION: 100 % | HEIGHT: 67 IN | HEART RATE: 96 BPM | RESPIRATION RATE: 18 BRPM | TEMPERATURE: 97.3 F | BODY MASS INDEX: 18.83 KG/M2

## 2025-05-28 DIAGNOSIS — R42 DIZZINESS: ICD-10-CM

## 2025-05-28 DIAGNOSIS — E86.0 DEHYDRATION: ICD-10-CM

## 2025-05-28 DIAGNOSIS — H81.10 BENIGN PAROXYSMAL POSITIONAL VERTIGO, UNSPECIFIED LATERALITY: Primary | ICD-10-CM

## 2025-05-28 LAB
ALBUMIN SERPL-MCNC: 4.3 G/DL (ref 3.2–4.6)
ALBUMIN/GLOB SERPL: 1.2 (ref 1–1.9)
ALP SERPL-CCNC: 70 U/L (ref 35–104)
ALT SERPL-CCNC: 13 U/L (ref 8–45)
ANION GAP SERPL CALC-SCNC: 14 MMOL/L (ref 7–16)
APPEARANCE UR: CLEAR
AST SERPL-CCNC: 31 U/L (ref 15–37)
BACTERIA URNS QL MICRO: NEGATIVE /HPF
BASOPHILS # BLD: 0.04 K/UL (ref 0–0.2)
BASOPHILS NFR BLD: 0.6 % (ref 0–2)
BILIRUB SERPL-MCNC: 1.1 MG/DL (ref 0–1.2)
BILIRUB UR QL: NEGATIVE
BUN SERPL-MCNC: 12 MG/DL (ref 8–23)
CALCIUM SERPL-MCNC: 10.3 MG/DL (ref 8.8–10.2)
CHLORIDE SERPL-SCNC: 98 MMOL/L (ref 98–107)
CO2 SERPL-SCNC: 23 MMOL/L (ref 20–29)
COLOR UR: ABNORMAL
CREAT SERPL-MCNC: 0.87 MG/DL (ref 0.6–1.1)
DIFFERENTIAL METHOD BLD: ABNORMAL
EOSINOPHIL # BLD: 0.04 K/UL (ref 0–0.8)
EOSINOPHIL NFR BLD: 0.6 % (ref 0.5–7.8)
EPI CELLS #/AREA URNS HPF: ABNORMAL /HPF
ERYTHROCYTE [DISTWIDTH] IN BLOOD BY AUTOMATED COUNT: 12.6 % (ref 11.9–14.6)
GLOBULIN SER CALC-MCNC: 3.4 G/DL (ref 2.3–3.5)
GLUCOSE SERPL-MCNC: 100 MG/DL (ref 70–99)
GLUCOSE UR STRIP.AUTO-MCNC: NEGATIVE MG/DL
HCT VFR BLD AUTO: 39.6 % (ref 35.8–46.3)
HGB BLD-MCNC: 14 G/DL (ref 11.7–15.4)
HGB UR QL STRIP: NEGATIVE
HYALINE CASTS URNS QL MICRO: ABNORMAL /LPF
IMM GRANULOCYTES # BLD AUTO: 0.02 K/UL (ref 0–0.5)
IMM GRANULOCYTES NFR BLD AUTO: 0.3 % (ref 0–5)
KETONES UR QL STRIP.AUTO: 40 MG/DL
LEUKOCYTE ESTERASE UR QL STRIP.AUTO: ABNORMAL
LYMPHOCYTES # BLD: 1.12 K/UL (ref 0.5–4.6)
LYMPHOCYTES NFR BLD: 17.6 % (ref 13–44)
MAGNESIUM SERPL-MCNC: 2.2 MG/DL (ref 1.8–2.4)
MCH RBC QN AUTO: 30.2 PG (ref 26.1–32.9)
MCHC RBC AUTO-ENTMCNC: 35.4 G/DL (ref 31.4–35)
MCV RBC AUTO: 85.5 FL (ref 82–102)
MONOCYTES # BLD: 0.46 K/UL (ref 0.1–1.3)
MONOCYTES NFR BLD: 7.2 % (ref 4–12)
NEUTS SEG # BLD: 4.7 K/UL (ref 1.7–8.2)
NEUTS SEG NFR BLD: 73.7 % (ref 43–78)
NITRITE UR QL STRIP.AUTO: NEGATIVE
NRBC # BLD: 0 K/UL (ref 0–0.2)
PH UR STRIP: 7 (ref 5–9)
PLATELET # BLD AUTO: 288 K/UL (ref 150–450)
PMV BLD AUTO: 9.9 FL (ref 9.4–12.3)
POTASSIUM SERPL-SCNC: 4.1 MMOL/L (ref 3.5–5.1)
PROT SERPL-MCNC: 7.7 G/DL (ref 6.3–8.2)
PROT UR STRIP-MCNC: NEGATIVE MG/DL
RBC # BLD AUTO: 4.63 M/UL (ref 4.05–5.2)
RBC #/AREA URNS HPF: ABNORMAL /HPF
SODIUM SERPL-SCNC: 135 MMOL/L (ref 136–145)
SP GR UR REFRACTOMETRY: 1.01 (ref 1–1.02)
TROPONIN T SERPL HS-MCNC: 8.9 NG/L (ref 0–14)
UROBILINOGEN UR QL STRIP.AUTO: 0.2 EU/DL (ref 0.2–1)
WBC # BLD AUTO: 6.4 K/UL (ref 4.3–11.1)
WBC URNS QL MICRO: ABNORMAL /HPF

## 2025-05-28 PROCEDURE — 81001 URINALYSIS AUTO W/SCOPE: CPT

## 2025-05-28 PROCEDURE — 84484 ASSAY OF TROPONIN QUANT: CPT

## 2025-05-28 PROCEDURE — 99285 EMERGENCY DEPT VISIT HI MDM: CPT

## 2025-05-28 PROCEDURE — 6370000000 HC RX 637 (ALT 250 FOR IP): Performed by: NURSE PRACTITIONER

## 2025-05-28 PROCEDURE — 85025 COMPLETE CBC W/AUTO DIFF WBC: CPT

## 2025-05-28 PROCEDURE — 2580000003 HC RX 258: Performed by: NURSE PRACTITIONER

## 2025-05-28 PROCEDURE — 71045 X-RAY EXAM CHEST 1 VIEW: CPT

## 2025-05-28 PROCEDURE — 83735 ASSAY OF MAGNESIUM: CPT

## 2025-05-28 PROCEDURE — 96360 HYDRATION IV INFUSION INIT: CPT

## 2025-05-28 PROCEDURE — 70450 CT HEAD/BRAIN W/O DYE: CPT

## 2025-05-28 PROCEDURE — 80053 COMPREHEN METABOLIC PANEL: CPT

## 2025-05-28 RX ORDER — 0.9 % SODIUM CHLORIDE 0.9 %
1000 INTRAVENOUS SOLUTION INTRAVENOUS ONCE
Status: COMPLETED | OUTPATIENT
Start: 2025-05-28 | End: 2025-05-28

## 2025-05-28 RX ORDER — MECLIZINE HYDROCHLORIDE 25 MG/1
25 TABLET ORAL
Status: COMPLETED | OUTPATIENT
Start: 2025-05-28 | End: 2025-05-28

## 2025-05-28 RX ORDER — MECLIZINE HCL 12.5 MG 12.5 MG/1
12.5 TABLET ORAL 3 TIMES DAILY PRN
Qty: 15 TABLET | Refills: 0 | Status: SHIPPED | OUTPATIENT
Start: 2025-05-28 | End: 2025-06-07

## 2025-05-28 RX ADMIN — SODIUM CHLORIDE 1000 ML: 0.9 INJECTION, SOLUTION INTRAVENOUS at 15:12

## 2025-05-28 RX ADMIN — MECLIZINE HYDROCHLORIDE 25 MG: 25 TABLET ORAL at 12:25

## 2025-05-28 ASSESSMENT — PAIN DESCRIPTION - LOCATION: LOCATION: FOOT

## 2025-05-28 ASSESSMENT — PAIN DESCRIPTION - DESCRIPTORS: DESCRIPTORS: TINGLING

## 2025-05-28 ASSESSMENT — PAIN - FUNCTIONAL ASSESSMENT: PAIN_FUNCTIONAL_ASSESSMENT: 0-10

## 2025-05-28 ASSESSMENT — PAIN SCALES - GENERAL: PAINLEVEL_OUTOF10: 8

## 2025-05-28 ASSESSMENT — PAIN DESCRIPTION - ORIENTATION: ORIENTATION: LEFT;RIGHT

## 2025-05-28 NOTE — ED NOTES
Patient has cup with UA sticker for when she is able void.     Osiris Rob, BETTEN  05/28/25 [id number]

## 2025-05-28 NOTE — ED TRIAGE NOTES
Patient to triage with CO feeling dizzy since 2000 last night that has been intermittent. Patient also CO having difficulty finding words. Reports having numbness in bilateral feet. Reports difficulty with balance x  months. Hx parkinsons

## 2025-05-28 NOTE — ED PROVIDER NOTES
Emergency Department Provider Note       [facility] EMERGENCY DEPT   PCP: Jermaine Barkley DO   Age: 75 y.o.   Sex: female     DISPOSITION Decision To Discharge 05/28/2025 05:09:50 PM    ICD-10-CM    1. Benign paroxysmal positional vertigo, unspecified laterality  H81.10       2. Dehydration  E86.0       3. Dizziness  R42           Medical Decision Making     75-year-old white female with a past medical history of palpitations, GERD, glaucoma, former cigarette smoker, anxiety, hepatitis C, NEIL, orthostatic hypotension, Parkinson's disease, neuropathy, gallstones, cholecystectomy, lumbar laminectomy, hysterectomy, presents emergency room with a chief complaint of onset of feeling dizzy since 8:00 last night it has been intermittent off and on.  She also feels like she has had some difficulty finding words.  Reports has been having numbness and tingling in the bilateral feet.  Reports difficulty with balance for months now.  She states the difficulty finding words the numbness in the feet the more stiff and rigid gait, difficulty with balance, has been ongoing for 4 months and has been progressively worse.  With the acute onset of dizziness off and on started at 8:00 last night.  She states that she was in bed she cannot recall whether she rolled or turned her head a certain way when it started.  It lasted for several minutes and seem to get better.  She felt his own while she was going to pass out.  She states then several times throughout the day today she has had the intermittent dizziness.  She called her neurologist who advised her to come to the emergency room for further evaluation and care.    Patient seen and evaluated in the emergency department.  Will obtain CBC, CMP, serum mag, urinalysis, twelve-lead EKG, troponin, CT of the head without contrast.  Working diagnosis of exacerbation of Parkinson's, benign positional vertigo, central vertigo, TIA versus CVA, fatigue, UTI, debility, generalized

## 2025-05-28 NOTE — DISCHARGE INSTRUCTIONS
Rest is much as possible  Make sure to stay well-hydrated drinking 64 to 80 ounces of clear fluids daily  Overall your workup here in the emergency department was reassuring.  CT scan shows no CT evidence of acute intracranial abnormality.  Your chest x-ray showed no acute cardiopulmonary abnormality.  Your twelve-lead EKG was reassuring.  Your lab work was all reassuring.  And urine showed no urinary tract infection.  You were mildly orthostatic when you came in.  But after IV fluids your blood pressure and heart rate responded as that should.  Your ambulation trial was reassuring.  However if you have any worsening in your condition, you start to have any worsening signs of dizziness, or that near-syncopal feeling comes back return to the ER right away  Use the meclizine sparingly but as needed for any acute vertigo.  This can cause what is known as CNS depression which can make you drowsy.  And can sometimes interact with the Sinemet to make you more drowsy than normal.  If this happens you can take just half the tablet.  And do not take it at the same time as your sentiment.

## 2025-05-28 NOTE — TELEPHONE ENCOUNTER
Patient called stating that she has been very dizzy since last night, feet are stiff and hallucinating. Discussed with Dr Barkley and advised patient to go to the ER.

## 2025-05-29 ENCOUNTER — CARE COORDINATION (OUTPATIENT)
Dept: CARE COORDINATION | Facility: CLINIC | Age: 75
End: 2025-05-29

## 2025-05-29 NOTE — CARE COORDINATION
Ambulatory Care Coordination Note     2025 4:03 PM     Patient Current Location:  [place]     This patient was received as a referral from Ambulatory Care Manager .    ACM contacted the patient by telephone. Verified name and  with patient as identifiers. Provided introduction to self, and explanation of the ACM role.   Patient declined care management services at this time.          ACM: Viridiana Lizama RN     Challenges to be reviewed by the provider   Additional needs identified to be addressed with provider No                 Method of communication with provider: phone.    Utilization: Initial Call - Discharge Date: 25   Discharge Facility: [facility]  Reason for ED Visit: dizziness  Visit Diagnosis: BPPV    Number of ED visits in the last 6 months: 2      Do you have any ongoing symptoms? Yes, my symptoms have improved.   Current symptoms: dizziness.    Did you call your PCP prior to going to the ED? No, did not call the PCP office.     Review of Discharge Instructions:   [x] AVS discharge instructions  [x] Right Care, Right Place, Right Time document  [x] Medication changes  [x] Follow up appointments  [x] Referral follow up          Care Summary Note: Pt has respectfully declined services at this time, my contact information has been shared with pt in the event there circumstances change. They are free to reach out at any time. ACM signing off.    PCP/Specialist follow up:   Future Appointments         Provider Specialty Dept Phone    2025 7:30 AM Mitchell Barkley MD Neurology [phone]    2025 8:40 AM Jermaine Barkley DO Internal Medicine [phone]    2025 8:15 AM Grinnell, Melissa R, PA-C Gastroenterology [phone]    2025 9:15 AM Eun Spencer MD Cardiology [phone]    2025 8:00 AM [facility] Vascular Surgery [phone]    2025 8:30 AM Jay Jay Wright MD Vascular Surgery [phone]

## 2025-06-02 ENCOUNTER — OFFICE VISIT (OUTPATIENT)
Dept: FAMILY MEDICINE CLINIC | Facility: CLINIC | Age: 75
End: 2025-06-02
Payer: MEDICARE

## 2025-06-02 VITALS
DIASTOLIC BLOOD PRESSURE: 73 MMHG | WEIGHT: 121.4 LBS | HEART RATE: 82 BPM | RESPIRATION RATE: 16 BRPM | OXYGEN SATURATION: 99 % | TEMPERATURE: 98 F | SYSTOLIC BLOOD PRESSURE: 115 MMHG | BODY MASS INDEX: 19.06 KG/M2 | HEIGHT: 67 IN

## 2025-06-02 DIAGNOSIS — R42 DIZZINESS: Primary | ICD-10-CM

## 2025-06-02 DIAGNOSIS — I95.1 ORTHOSTASIS: ICD-10-CM

## 2025-06-02 DIAGNOSIS — K59.00 CONSTIPATION, UNSPECIFIED CONSTIPATION TYPE: ICD-10-CM

## 2025-06-02 DIAGNOSIS — H81.10 BENIGN PAROXYSMAL POSITIONAL VERTIGO, UNSPECIFIED LATERALITY: ICD-10-CM

## 2025-06-02 PROCEDURE — 1090F PRES/ABSN URINE INCON ASSESS: CPT | Performed by: PHYSICIAN ASSISTANT

## 2025-06-02 PROCEDURE — 3017F COLORECTAL CA SCREEN DOC REV: CPT | Performed by: PHYSICIAN ASSISTANT

## 2025-06-02 PROCEDURE — 1123F ACP DISCUSS/DSCN MKR DOCD: CPT | Performed by: PHYSICIAN ASSISTANT

## 2025-06-02 PROCEDURE — G8427 DOCREV CUR MEDS BY ELIG CLIN: HCPCS | Performed by: PHYSICIAN ASSISTANT

## 2025-06-02 PROCEDURE — 1036F TOBACCO NON-USER: CPT | Performed by: PHYSICIAN ASSISTANT

## 2025-06-02 PROCEDURE — 99215 OFFICE O/P EST HI 40 MIN: CPT | Performed by: PHYSICIAN ASSISTANT

## 2025-06-02 PROCEDURE — G8399 PT W/DXA RESULTS DOCUMENT: HCPCS | Performed by: PHYSICIAN ASSISTANT

## 2025-06-02 PROCEDURE — G8420 CALC BMI NORM PARAMETERS: HCPCS | Performed by: PHYSICIAN ASSISTANT

## 2025-06-02 PROCEDURE — 1160F RVW MEDS BY RX/DR IN RCRD: CPT | Performed by: PHYSICIAN ASSISTANT

## 2025-06-02 PROCEDURE — 1159F MED LIST DOCD IN RCRD: CPT | Performed by: PHYSICIAN ASSISTANT

## 2025-06-02 NOTE — PROGRESS NOTES
Shalini Cabrera PA-C, [facility], MPH  [facility]  [address], [address]  [place] [address]  Phone [phone]    SUBJECTIVE  Chief Complaint   Patient presents with    Follow-up     Constipation/dehydration was given fluids     ER follow-up     HPI   Jessenia Prakash is a 75 y.o. female with PMH as below presents for an acute visit.     Chart reviewed.  Patient was seen in the [facility] ER on 5/28/2025 for evaluation of dizziness that started the previous night as well as difficulty finding words, numbness in bilateral feet, and difficulty with balance times months.  She has a history of Parkinson's disease.  Lab workup was reassuring.  She did not have leukocytosis.  Sodium was 135.  Creatinine was at baseline.  Calcium mildly elevated at 10.3.  Magnesium and troponin were normal.  Urinalysis was normal other than positive for ketones and trace leuk esterase.  Portable chest x-ray showed her lungs and no acute findings in the chest.  CT head without contrast showed no CT evidence of acute intracranial abnormality. Orthostatic vital signs showed dynamic changes with drop in blood pressure from lying to standing. Patient was given a liter of IV fluids and meclizine 25 mg p.o. with improvement in symptoms and orthostasis.  She was discharged home with prescription for meclizine 12.5 mg 1 tablet p.o. 3 times daily as needed dizziness.    Pt is accompanied today by her , Ten, who augments the history.    Pt states she is still dizzy but not as much as previously. It is triggered by rolling over. It comes and goes away between episodes. States her eyes \"shake\" when she has vertigo episodes. She has been doing the Epley maneuver. She continues to have spinning sensation at times if she turns her head rapidly. She is doing the Epley maneuver daily and states her dizzy spells overall will last shorter periods of time than previously. States that sometimes after

## 2025-06-03 ENCOUNTER — TELEPHONE (OUTPATIENT)
Dept: INTERNAL MEDICINE CLINIC | Facility: CLINIC | Age: 75
End: 2025-06-03

## 2025-06-03 NOTE — TELEPHONE ENCOUNTER
Can we please try and get this patient follow-up with myself or another provider in the next 2 weeks or so for follow-up on her blood pressure?  Thanks

## 2025-06-04 ENCOUNTER — OFFICE VISIT (OUTPATIENT)
Dept: INTERNAL MEDICINE CLINIC | Facility: CLINIC | Age: 75
End: 2025-06-04

## 2025-06-04 VITALS
BODY MASS INDEX: 18.99 KG/M2 | SYSTOLIC BLOOD PRESSURE: 112 MMHG | DIASTOLIC BLOOD PRESSURE: 72 MMHG | TEMPERATURE: 97.1 F | WEIGHT: 121 LBS | HEART RATE: 64 BPM | HEIGHT: 67 IN

## 2025-06-04 DIAGNOSIS — K59.09 CHRONIC CONSTIPATION: ICD-10-CM

## 2025-06-04 DIAGNOSIS — G62.9 NEUROPATHY: ICD-10-CM

## 2025-06-04 DIAGNOSIS — I95.1 ORTHOSTATIC HYPOTENSION: ICD-10-CM

## 2025-06-04 DIAGNOSIS — R63.4 UNINTENTIONAL WEIGHT LOSS: ICD-10-CM

## 2025-06-04 DIAGNOSIS — H69.92 DYSFUNCTION OF LEFT EUSTACHIAN TUBE: ICD-10-CM

## 2025-06-04 DIAGNOSIS — R42 DIZZINESS: Primary | ICD-10-CM

## 2025-06-04 LAB — VIT B12 SERPL-MCNC: 627 PG/ML (ref 193–986)

## 2025-06-04 RX ORDER — IPRATROPIUM BROMIDE 21 UG/1
2 SPRAY, METERED NASAL EVERY 12 HOURS
Qty: 30 ML | Refills: 3 | Status: SHIPPED | OUTPATIENT
Start: 2025-06-04

## 2025-06-04 ASSESSMENT — ENCOUNTER SYMPTOMS
BLOOD IN STOOL: 0
NAUSEA: 1
COLOR CHANGE: 1
ANAL BLEEDING: 0
VOMITING: 0
CONSTIPATION: 1
SHORTNESS OF BREATH: 1

## 2025-06-04 NOTE — PROGRESS NOTES
Jessenia Prakash (:  1950) is a 75 y.o. female,Established patient, here for evaluation of the following chief complaint(s):  Blood Pressure Check and Follow-up         Assessment & Plan  Dizziness  Likely multifactorial in setting of BPPV as well as orthostatic hypotension due to Parkinson's disease  Possible component from eustachian tube dysfunction given effusion behind left tympanic membrane on exam today.  Suspect this is causing some of the decreased hearing and sensation of fullness in the left ear but if no significant improvement in the symptoms with below management then consider Ménière's in the differential  Echo on 3/16/2020 with EF of 55 to 60%, normal LV diastolic function and RV systolic function.  No evidence of PFO or significant valvulopathy  Carotid artery ultrasound from 2024 with less than 50% stenosis of the bilateral ICA, normal antegrade flow of the bilateral vertebral arteries  CT head without contrast from 2025 with evidence of chronic microvascular ischemia, mild global parenchymal atrophy but no acute intracranial abnormality  Troponin negative x 1 on 2025.  No significant electrolyte derangements, renal insufficiency, anemia or leukocytosis  EKG from 2025 personally reviewed with sinus rhythm, poor baseline, normal axis  Patient has been doing Epley maneuvers.  Was given prescription for as needed meclizine (I counseled on potential sedating nature of this medication)  Maintain adequate hydration, orthostatic precautions as discussed further below       Orthostatic hypotension  Secondary to Parkinson's disease  Has been counseled on gradual position changes, use of compression stockings (patient recently acquired thigh-high compression stockings that she plans on using), adequate hydration  Cardiac testing as above  Based on frequency and severity of symptoms consider weaning off beta-blocker and/or low-dose midodrine         Neuropathy  Suspect

## 2025-06-04 NOTE — ASSESSMENT & PLAN NOTE
Prior intolerance to Flonase with hallucinations  Start trial of ipratropium nasal spray    Orders:    ipratropium (ATROVENT) 0.03 % nasal spray; 2 sprays by Each Nostril route in the morning and 2 sprays in the evening.

## 2025-06-05 ENCOUNTER — HOSPITAL ENCOUNTER (EMERGENCY)
Age: 75
Discharge: HOME | End: 2025-06-05
Payer: MEDICARE

## 2025-06-05 ENCOUNTER — APPOINTMENT (OUTPATIENT)
Dept: MRI IMAGING | Age: 75
End: 2025-06-05
Payer: MEDICARE

## 2025-06-05 ENCOUNTER — RESULTS FOLLOW-UP (OUTPATIENT)
Dept: INTERNAL MEDICINE CLINIC | Facility: CLINIC | Age: 75
End: 2025-06-05

## 2025-06-05 ENCOUNTER — APPOINTMENT (OUTPATIENT)
Dept: CT IMAGING | Age: 75
End: 2025-06-05
Payer: MEDICARE

## 2025-06-05 VITALS
WEIGHT: 121 LBS | HEIGHT: 67 IN | OXYGEN SATURATION: 96 % | DIASTOLIC BLOOD PRESSURE: 68 MMHG | RESPIRATION RATE: 17 BRPM | TEMPERATURE: 97.4 F | HEART RATE: 86 BPM | SYSTOLIC BLOOD PRESSURE: 121 MMHG | BODY MASS INDEX: 18.99 KG/M2

## 2025-06-05 DIAGNOSIS — I77.3 FIBROMUSCULAR DYSPLASIA: ICD-10-CM

## 2025-06-05 DIAGNOSIS — R42 DIZZINESS: Primary | ICD-10-CM

## 2025-06-05 LAB
ALBUMIN SERPL-MCNC: 4.1 G/DL (ref 3.2–4.6)
ALBUMIN/GLOB SERPL: 1.5 (ref 1–1.9)
ALP SERPL-CCNC: 73 U/L (ref 35–104)
ALT SERPL-CCNC: 5 U/L (ref 8–45)
ANION GAP SERPL CALC-SCNC: 15 MMOL/L (ref 7–16)
AST SERPL-CCNC: 28 U/L (ref 15–37)
BASOPHILS # BLD: 0.05 K/UL (ref 0–0.2)
BASOPHILS NFR BLD: 1 % (ref 0–2)
BILIRUB SERPL-MCNC: 0.9 MG/DL (ref 0–1.2)
BUN SERPL-MCNC: 14 MG/DL (ref 8–23)
CALCIUM SERPL-MCNC: 9.6 MG/DL (ref 8.8–10.2)
CHLORIDE SERPL-SCNC: 100 MMOL/L (ref 98–107)
CO2 SERPL-SCNC: 22 MMOL/L (ref 20–29)
CREAT SERPL-MCNC: 0.84 MG/DL (ref 0.6–1.1)
DIFFERENTIAL METHOD BLD: NORMAL
EKG ATRIAL RATE: 70 BPM
EKG DIAGNOSIS: NORMAL
EKG P AXIS: 76 DEGREES
EKG P-R INTERVAL: 168 MS
EKG Q-T INTERVAL: 401 MS
EKG QRS DURATION: 87 MS
EKG QTC CALCULATION (BAZETT): 436 MS
EKG R AXIS: 68 DEGREES
EKG T AXIS: 57 DEGREES
EKG VENTRICULAR RATE: 71 BPM
EOSINOPHIL # BLD: 0.12 K/UL (ref 0–0.8)
EOSINOPHIL NFR BLD: 2.3 % (ref 0.5–7.8)
ERYTHROCYTE [DISTWIDTH] IN BLOOD BY AUTOMATED COUNT: 12.4 % (ref 11.9–14.6)
GLOBULIN SER CALC-MCNC: 2.7 G/DL (ref 2.3–3.5)
GLUCOSE BLD STRIP.AUTO-MCNC: 109 MG/DL (ref 65–100)
GLUCOSE SERPL-MCNC: 104 MG/DL (ref 70–99)
HCT VFR BLD AUTO: 39.1 % (ref 35.8–46.3)
HGB BLD-MCNC: 13 G/DL (ref 11.7–15.4)
IMM GRANULOCYTES # BLD AUTO: 0.01 K/UL (ref 0–0.5)
IMM GRANULOCYTES NFR BLD AUTO: 0.2 % (ref 0–5)
INR PPP: 0.9
LYMPHOCYTES # BLD: 1.18 K/UL (ref 0.5–4.6)
LYMPHOCYTES NFR BLD: 23 % (ref 13–44)
MCH RBC QN AUTO: 29.3 PG (ref 26.1–32.9)
MCHC RBC AUTO-ENTMCNC: 33.2 G/DL (ref 31.4–35)
MCV RBC AUTO: 88.1 FL (ref 82–102)
MONOCYTES # BLD: 0.48 K/UL (ref 0.1–1.3)
MONOCYTES NFR BLD: 9.4 % (ref 4–12)
NEUTS SEG # BLD: 3.28 K/UL (ref 1.7–8.2)
NEUTS SEG NFR BLD: 64.1 % (ref 43–78)
NRBC # BLD: 0 K/UL (ref 0–0.2)
PLATELET # BLD AUTO: 249 K/UL (ref 150–450)
PMV BLD AUTO: 9.9 FL (ref 9.4–12.3)
POTASSIUM SERPL-SCNC: 4.3 MMOL/L (ref 3.5–5.1)
PROT SERPL-MCNC: 6.8 G/DL (ref 6.3–8.2)
PROTHROMBIN TIME: 12.7 SEC (ref 11.3–14.9)
RBC # BLD AUTO: 4.44 M/UL (ref 4.05–5.2)
SERVICE CMNT-IMP: ABNORMAL
SODIUM SERPL-SCNC: 137 MMOL/L (ref 136–145)
TROPONIN T SERPL HS-MCNC: <6 NG/L (ref 0–14)
WBC # BLD AUTO: 5.1 K/UL (ref 4.3–11.1)

## 2025-06-05 PROCEDURE — 93005 ELECTROCARDIOGRAM TRACING: CPT | Performed by: STUDENT IN AN ORGANIZED HEALTH CARE EDUCATION/TRAINING PROGRAM

## 2025-06-05 PROCEDURE — 84484 ASSAY OF TROPONIN QUANT: CPT

## 2025-06-05 PROCEDURE — 80053 COMPREHEN METABOLIC PANEL: CPT

## 2025-06-05 PROCEDURE — 70553 MRI BRAIN STEM W/O & W/DYE: CPT

## 2025-06-05 PROCEDURE — 70496 CT ANGIOGRAPHY HEAD: CPT

## 2025-06-05 PROCEDURE — 82962 GLUCOSE BLOOD TEST: CPT

## 2025-06-05 PROCEDURE — 85610 PROTHROMBIN TIME: CPT

## 2025-06-05 PROCEDURE — 85025 COMPLETE CBC W/AUTO DIFF WBC: CPT

## 2025-06-05 PROCEDURE — A9579 GAD-BASE MR CONTRAST NOS,1ML: HCPCS | Performed by: STUDENT IN AN ORGANIZED HEALTH CARE EDUCATION/TRAINING PROGRAM

## 2025-06-05 PROCEDURE — 6360000004 HC RX CONTRAST MEDICATION: Performed by: STUDENT IN AN ORGANIZED HEALTH CARE EDUCATION/TRAINING PROGRAM

## 2025-06-05 PROCEDURE — 70450 CT HEAD/BRAIN W/O DYE: CPT

## 2025-06-05 PROCEDURE — 99285 EMERGENCY DEPT VISIT HI MDM: CPT

## 2025-06-05 PROCEDURE — 93010 ELECTROCARDIOGRAM REPORT: CPT | Performed by: INTERNAL MEDICINE

## 2025-06-05 PROCEDURE — 6370000000 HC RX 637 (ALT 250 FOR IP): Performed by: STUDENT IN AN ORGANIZED HEALTH CARE EDUCATION/TRAINING PROGRAM

## 2025-06-05 RX ORDER — IOPAMIDOL 755 MG/ML
60 INJECTION, SOLUTION INTRAVASCULAR
Status: COMPLETED | OUTPATIENT
Start: 2025-06-05 | End: 2025-06-05

## 2025-06-05 RX ORDER — MECLIZINE HYDROCHLORIDE 25 MG/1
50 TABLET ORAL
Status: COMPLETED | OUTPATIENT
Start: 2025-06-05 | End: 2025-06-05

## 2025-06-05 RX ORDER — GADOTERIDOL 279.3 MG/ML
11 INJECTION INTRAVENOUS
Status: COMPLETED | OUTPATIENT
Start: 2025-06-05 | End: 2025-06-05

## 2025-06-05 RX ADMIN — GADOTERIDOL 11 ML: 279.3 INJECTION, SOLUTION INTRAVENOUS at 15:25

## 2025-06-05 RX ADMIN — MECLIZINE HYDROCHLORIDE 50 MG: 25 TABLET ORAL at 14:36

## 2025-06-05 RX ADMIN — IOPAMIDOL 60 ML: 755 INJECTION, SOLUTION INTRAVENOUS at 15:42

## 2025-06-05 ASSESSMENT — ENCOUNTER SYMPTOMS
PHOTOPHOBIA: 0
VOMITING: 0
COUGH: 0
ABDOMINAL PAIN: 0
SHORTNESS OF BREATH: 0
FACIAL SWELLING: 0
TROUBLE SWALLOWING: 0

## 2025-06-05 ASSESSMENT — PAIN - FUNCTIONAL ASSESSMENT: PAIN_FUNCTIONAL_ASSESSMENT: 0-10

## 2025-06-05 ASSESSMENT — PAIN SCALES - GENERAL: PAINLEVEL_OUTOF10: 0

## 2025-06-05 NOTE — PROGRESS NOTES
Requesting MRI screening form for patient. Signatures are required by RN and patient / legal authority. If any complications with the signing pad, press the \"N\" symbol and use the mouse please.

## 2025-06-05 NOTE — ED TRIAGE NOTES
Pt w/c to triage with c/o intermittent dizziness X40 minutes. Pt reports history of Parkinson's. Pt reports she was diagnosed with vertigo at  DT last Wednesday, but states she has not taken the prescribed Meclizine.

## 2025-06-05 NOTE — DISCHARGE INSTRUCTIONS
Your workup here was reassuring.  You can use the prescribed meclizine from prior visit to help with your dizziness.  You can use the Epley maneuver at home to help with your dizziness.  Follow-up as needed.

## 2025-06-06 ENCOUNTER — CARE COORDINATION (OUTPATIENT)
Dept: CARE COORDINATION | Facility: CLINIC | Age: 75
End: 2025-06-06

## 2025-06-06 NOTE — CARE COORDINATION
Ambulatory Care Coordination Note     2025 10:41 AM     Patient Current Location:  [place]     This patient was received as a referral from Ambulatory Care Manager .    ACM contacted the patient by telephone. Verified name and  with patient as identifiers. Provided introduction to self, and explanation of the ACM role.   Patient declined care management services at this time.          ACM: Viridiana Lizama RN     Challenges to be reviewed by the provider   Additional needs identified to be addressed with provider No                 Method of communication with provider: phone.    Utilization: Initial Call - Discharge Date: 25   Discharge Facility: [facility]  Reason for ED Visit: dizziness  Visit Diagnosis: dizziness    Number of ED visits in the last 6 months: 3      Do you have any ongoing symptoms? Yes, my symptoms have improved.   Current symptoms: dizziness.    Did you call your PCP prior to going to the ED? No, did not call the PCP office.     Review of Discharge Instructions:   [x] AVS discharge instructions  [x] Right Care, Right Place, Right Time document  [x] Medication changes  [x] Follow up appointments  [x] Referral follow up          Care Summary Note:  PCP/Specialist follow up:   Future Appointments         Provider Specialty Dept Phone    2025 7:30 AM Mitchell Barkley MD Neurology [phone]    2025 8:40 AM Jermaine Barkley,  Internal Medicine [phone]    2025 8:15 AM Grinnell, Melissa R, PA-C Gastroenterology [phone]    2025 9:15 AM Eun Spencer MD Cardiology [phone]    2025 8:00 AM [facility] Vascular Surgery [phone]    2025 8:30 AM Jay Jay Wright MD Vascular Surgery [phone]

## 2025-06-09 LAB — VIT B1 BLD-SCNC: 142.6 NMOL/L (ref 66.5–200)

## 2025-06-10 LAB — VIT B6 SERPL-MCNC: 29.2 UG/L (ref 3.4–65.2)

## 2025-07-25 ENCOUNTER — TELEPHONE (OUTPATIENT)
Dept: INTERNAL MEDICINE CLINIC | Facility: CLINIC | Age: 75
End: 2025-07-25

## 2025-07-25 ENCOUNTER — TELEPHONE (OUTPATIENT)
Dept: NEUROLOGY | Age: 75
End: 2025-07-25

## 2025-07-25 NOTE — TELEPHONE ENCOUNTER
Patient is having episodes of extreme vertigo and dizziness. She took Meclizine yesterday but she is not sure how often she can take it.

## 2025-07-25 NOTE — TELEPHONE ENCOUNTER
Pt and  called, pt is experiencing dizziness and vertigo for 6 months due to Parkinson's. States that it has worsened in past week. Made pt and  aware of next avail w/our practice (7/28) as well as [place] (7/30) and [place] (8/4). Made aware of UC/ED. Pt and  voiced understanding.

## 2025-07-25 NOTE — TELEPHONE ENCOUNTER
Meclizine can be taken up to 3 times a day.      She has an appointment on 8/5 so we can follow-up.      Should symptoms worsen before then, then she can go to the ED.

## 2025-07-28 ENCOUNTER — OFFICE VISIT (OUTPATIENT)
Dept: INTERNAL MEDICINE CLINIC | Facility: CLINIC | Age: 75
End: 2025-07-28

## 2025-07-28 VITALS
HEIGHT: 67 IN | HEART RATE: 81 BPM | SYSTOLIC BLOOD PRESSURE: 118 MMHG | TEMPERATURE: 97 F | OXYGEN SATURATION: 99 % | BODY MASS INDEX: 18.83 KG/M2 | WEIGHT: 120 LBS | DIASTOLIC BLOOD PRESSURE: 72 MMHG

## 2025-07-28 DIAGNOSIS — R55 NEAR SYNCOPE: ICD-10-CM

## 2025-07-28 DIAGNOSIS — R42 DIZZINESS: ICD-10-CM

## 2025-07-28 DIAGNOSIS — I95.1 ORTHOSTATIC HYPOTENSION: Primary | ICD-10-CM

## 2025-07-28 DIAGNOSIS — H69.92 DYSFUNCTION OF LEFT EUSTACHIAN TUBE: ICD-10-CM

## 2025-07-28 DIAGNOSIS — G20.A1 PARKINSON'S DISEASE WITHOUT DYSKINESIA OR FLUCTUATING MANIFESTATIONS (HCC): ICD-10-CM

## 2025-07-28 NOTE — PROGRESS NOTES
[facility]      2025    Patient Name: Jessenia Prakash  :  1950      Chief Complaint:  Chief Complaint   Patient presents with    Follow-up     ER, vertigo, feels out of sorts         HPI  Patient presents today for follow-up on dizziness. Symptoms started about two months ago. She has been seen in the ER twice, as well as our office and urgent care, since late-May when symptoms started. Symptoms occurring mainly with movements of the head and rolling over in bed. Has been trying to do the Epley maneuver at home but denies improvement. Upon chart review, it appears echo completed in 2020 with EF of 55 to 60%, normal LV diastolic function and RV systolic function. No evidence of PFO or significant valvulopathy. Carotid artery ultrasound from 2024 with less than 50% stenosis of the bilateral ICA, normal antegrade flow of the bilateral vertebral arteries. CT head without contrast from 2025 with evidence of chronic microvascular ischemia, mild global parenchymal atrophy but no acute intracranial abnormality. Troponin negative x 1 on 2025. Orthostatics positive in ER on 25. Given fluids and meclizine. Symptoms improved.     25 office note from PCP visit reviewed. Symptoms felt to be multifactorial in the setting of BPPV, as well as orthostatic hypotension due to Parkinson's disease. Possible component of eustachian tube dysfunction as she has serous otitis media behind left TM at that time.    She would like to see an ENT specialist.    She is now also having positional lightheadedness which started about 10 days ago. No syncope. Occurring mainly when changing positions, such as when going from lying to sitting and sitting to standing. She denies any associated CP, palpitations, SOB. Wearing knee-high compression stockings. Drinking at least 80 oz of water daily. It appears that she was seen by cardiology on 25 following an ER visit for chest pain. ER workup

## 2025-07-29 ASSESSMENT — ENCOUNTER SYMPTOMS
WHEEZING: 0
COUGH: 0
VOMITING: 0
DIARRHEA: 0
ABDOMINAL PAIN: 0
NAUSEA: 0
SHORTNESS OF BREATH: 0
SORE THROAT: 0
CONSTIPATION: 0

## 2025-07-31 ENCOUNTER — TELEPHONE (OUTPATIENT)
Age: 75
End: 2025-07-31

## 2025-07-31 NOTE — TELEPHONE ENCOUNTER
Pt's  Ten (on [place]) calling in concerned about pt. States her BP keeps fluctuating with standing, laying, and sitting. She gets light headed if she stands too long. Feels fatigue. Has had slight nausea. Feels sob when she gets anxious. Pt had to lay down just from sitting up to watch tv. Said her feet are burning and they tried compression socks. It seemed to have made her feel worse when wearing them.

## 2025-07-31 NOTE — TELEPHONE ENCOUNTER
Advised patient and Ten of Dr. Alexander's response. Advised them to monitor and record BP and HR and bring BP record and home monitor to appointment. Patient and Ten verbalized understanding.

## 2025-07-31 NOTE — TELEPHONE ENCOUNTER
Feels okay when flat in bed, but gets \"super light headed\" and nauseated when she sits up and stands up, often feeling she will pass out.   Per medical record, at yesterday's PCP appointment:         Supine BP-150/88, HR-77.        Sitting BP-130/82, HR-83.        Standing BP-118/80, HR-99.  Increased SOB when she is feeling very anxious.   Burning pain in feet with intermittent dark redness, bright redness, and paleness of skin due to neuropathy.   Slight swelling in ankles.   No chest pain.   Compression socks cause increased discomfort in feet.   Next scheduled appointment with Dr. Alexander is 8/11/25.   Taking ASA 81 mg qd, Toprol XL 25 mg qd, and pravastatin 20 mg qd.     Patient asks for recommendations for symptomatic low BP when sitting and standing. She asks for earlier appointment with Dr. Alexander.

## 2025-07-31 NOTE — TELEPHONE ENCOUNTER
Those BP readings are not terribly concerning.  Yes they drop but still quite good.  Hydrate, compression socks and follow up as scheduled. ER if worse

## 2025-08-05 ENCOUNTER — OFFICE VISIT (OUTPATIENT)
Dept: NEUROLOGY | Age: 75
End: 2025-08-05
Payer: MEDICARE

## 2025-08-05 VITALS
HEIGHT: 67 IN | WEIGHT: 120 LBS | DIASTOLIC BLOOD PRESSURE: 62 MMHG | BODY MASS INDEX: 18.83 KG/M2 | HEART RATE: 63 BPM | SYSTOLIC BLOOD PRESSURE: 92 MMHG

## 2025-08-05 DIAGNOSIS — I95.1 ORTHOSTATIC HYPOTENSION: ICD-10-CM

## 2025-08-05 DIAGNOSIS — F41.9 ANXIETY AND DEPRESSION: ICD-10-CM

## 2025-08-05 DIAGNOSIS — G20.A1 PARKINSON'S DISEASE WITHOUT DYSKINESIA OR FLUCTUATING MANIFESTATIONS (HCC): Primary | ICD-10-CM

## 2025-08-05 DIAGNOSIS — F32.A ANXIETY AND DEPRESSION: ICD-10-CM

## 2025-08-05 DIAGNOSIS — M79.2 NEUROPATHIC PAIN: ICD-10-CM

## 2025-08-05 DIAGNOSIS — Z86.73 HISTORY OF TIA (TRANSIENT ISCHEMIC ATTACK): ICD-10-CM

## 2025-08-05 PROCEDURE — G8399 PT W/DXA RESULTS DOCUMENT: HCPCS | Performed by: PSYCHIATRY & NEUROLOGY

## 2025-08-05 PROCEDURE — 3017F COLORECTAL CA SCREEN DOC REV: CPT | Performed by: PSYCHIATRY & NEUROLOGY

## 2025-08-05 PROCEDURE — 1159F MED LIST DOCD IN RCRD: CPT | Performed by: PSYCHIATRY & NEUROLOGY

## 2025-08-05 PROCEDURE — 99215 OFFICE O/P EST HI 40 MIN: CPT | Performed by: PSYCHIATRY & NEUROLOGY

## 2025-08-05 PROCEDURE — 1036F TOBACCO NON-USER: CPT | Performed by: PSYCHIATRY & NEUROLOGY

## 2025-08-05 PROCEDURE — G8427 DOCREV CUR MEDS BY ELIG CLIN: HCPCS | Performed by: PSYCHIATRY & NEUROLOGY

## 2025-08-05 PROCEDURE — 1123F ACP DISCUSS/DSCN MKR DOCD: CPT | Performed by: PSYCHIATRY & NEUROLOGY

## 2025-08-05 PROCEDURE — 1090F PRES/ABSN URINE INCON ASSESS: CPT | Performed by: PSYCHIATRY & NEUROLOGY

## 2025-08-05 PROCEDURE — G8420 CALC BMI NORM PARAMETERS: HCPCS | Performed by: PSYCHIATRY & NEUROLOGY

## 2025-08-05 ASSESSMENT — ENCOUNTER SYMPTOMS
COUGH: 0
CONSTIPATION: 0

## 2025-08-06 PROBLEM — Z86.73 HISTORY OF STROKE: Status: ACTIVE | Noted: 2020-03-15

## 2025-08-08 ENCOUNTER — OFFICE VISIT (OUTPATIENT)
Age: 75
End: 2025-08-08
Payer: MEDICARE

## 2025-08-08 VITALS
SYSTOLIC BLOOD PRESSURE: 119 MMHG | OXYGEN SATURATION: 99 % | BODY MASS INDEX: 18.8 KG/M2 | DIASTOLIC BLOOD PRESSURE: 73 MMHG | WEIGHT: 119.8 LBS | RESPIRATION RATE: 17 BRPM | HEART RATE: 89 BPM | HEIGHT: 67 IN

## 2025-08-08 DIAGNOSIS — K59.01 SLOW TRANSIT CONSTIPATION: Primary | ICD-10-CM

## 2025-08-08 PROCEDURE — G8399 PT W/DXA RESULTS DOCUMENT: HCPCS | Performed by: PHYSICIAN ASSISTANT

## 2025-08-08 PROCEDURE — 1123F ACP DISCUSS/DSCN MKR DOCD: CPT | Performed by: PHYSICIAN ASSISTANT

## 2025-08-08 PROCEDURE — 99213 OFFICE O/P EST LOW 20 MIN: CPT | Performed by: PHYSICIAN ASSISTANT

## 2025-08-08 PROCEDURE — G8427 DOCREV CUR MEDS BY ELIG CLIN: HCPCS | Performed by: PHYSICIAN ASSISTANT

## 2025-08-08 PROCEDURE — 1090F PRES/ABSN URINE INCON ASSESS: CPT | Performed by: PHYSICIAN ASSISTANT

## 2025-08-08 PROCEDURE — G8420 CALC BMI NORM PARAMETERS: HCPCS | Performed by: PHYSICIAN ASSISTANT

## 2025-08-08 PROCEDURE — 3017F COLORECTAL CA SCREEN DOC REV: CPT | Performed by: PHYSICIAN ASSISTANT

## 2025-08-08 PROCEDURE — 1036F TOBACCO NON-USER: CPT | Performed by: PHYSICIAN ASSISTANT

## 2025-08-08 PROCEDURE — 1159F MED LIST DOCD IN RCRD: CPT | Performed by: PHYSICIAN ASSISTANT

## 2025-08-08 RX ORDER — LUBIPROSTONE 8 UG/1
8 CAPSULE ORAL 2 TIMES DAILY WITH MEALS
Qty: 180 CAPSULE | Refills: 0 | Status: SHIPPED | OUTPATIENT
Start: 2025-08-08

## 2025-08-11 ENCOUNTER — OFFICE VISIT (OUTPATIENT)
Age: 75
End: 2025-08-11
Payer: MEDICARE

## 2025-08-11 VITALS
BODY MASS INDEX: 20.25 KG/M2 | HEART RATE: 104 BPM | DIASTOLIC BLOOD PRESSURE: 68 MMHG | SYSTOLIC BLOOD PRESSURE: 110 MMHG | HEIGHT: 67 IN | WEIGHT: 129 LBS

## 2025-08-11 DIAGNOSIS — I95.1 ORTHOSTATIC HYPOTENSION: Primary | ICD-10-CM

## 2025-08-11 PROCEDURE — 99214 OFFICE O/P EST MOD 30 MIN: CPT | Performed by: INTERNAL MEDICINE

## 2025-08-11 PROCEDURE — 1036F TOBACCO NON-USER: CPT | Performed by: INTERNAL MEDICINE

## 2025-08-11 PROCEDURE — 1126F AMNT PAIN NOTED NONE PRSNT: CPT | Performed by: INTERNAL MEDICINE

## 2025-08-11 PROCEDURE — G8420 CALC BMI NORM PARAMETERS: HCPCS | Performed by: INTERNAL MEDICINE

## 2025-08-11 PROCEDURE — G8427 DOCREV CUR MEDS BY ELIG CLIN: HCPCS | Performed by: INTERNAL MEDICINE

## 2025-08-11 PROCEDURE — 1160F RVW MEDS BY RX/DR IN RCRD: CPT | Performed by: INTERNAL MEDICINE

## 2025-08-11 PROCEDURE — 3017F COLORECTAL CA SCREEN DOC REV: CPT | Performed by: INTERNAL MEDICINE

## 2025-08-11 PROCEDURE — G8399 PT W/DXA RESULTS DOCUMENT: HCPCS | Performed by: INTERNAL MEDICINE

## 2025-08-11 PROCEDURE — 1090F PRES/ABSN URINE INCON ASSESS: CPT | Performed by: INTERNAL MEDICINE

## 2025-08-11 PROCEDURE — 1123F ACP DISCUSS/DSCN MKR DOCD: CPT | Performed by: INTERNAL MEDICINE

## 2025-08-11 PROCEDURE — 1159F MED LIST DOCD IN RCRD: CPT | Performed by: INTERNAL MEDICINE

## 2025-08-11 RX ORDER — SODIUM CHLORIDE 1 G/1
1 TABLET ORAL 2 TIMES DAILY
COMMUNITY
Start: 2025-08-11

## 2025-09-04 DIAGNOSIS — G20.A1 PARKINSON'S DISEASE WITHOUT DYSKINESIA OR FLUCTUATING MANIFESTATIONS (HCC): ICD-10-CM

## 2025-09-04 RX ORDER — CARBIDOPA AND LEVODOPA 25; 100 MG/1; MG/1
1 TABLET ORAL 4 TIMES DAILY
Qty: 360 TABLET | Refills: 3 | Status: SHIPPED | OUTPATIENT
Start: 2025-09-04

## (undated) DEVICE — LUBE JELLY FOIL PACK 1.4 OZ: Brand: MEDLINE INDUSTRIES, INC.

## (undated) DEVICE — GAUZE,SPONGE,4"X4",12PLY,WOVEN,NS,LF: Brand: MEDLINE

## (undated) DEVICE — AIRLIFE™ OXYGEN TUBING 7 FEET (2.1 M) CRUSH RESISTANT OXYGEN TUBING, VINYL TIPPED: Brand: AIRLIFE™

## (undated) DEVICE — KENDALL RADIOLUCENT FOAM MONITORING ELECTRODE RECTANGULAR SHAPE: Brand: KENDALL

## (undated) DEVICE — SINGLE PORT MANIFOLD: Brand: NEPTUNE 2

## (undated) DEVICE — SYRINGE MEDICAL 3ML CLEAR PLASTIC STANDARD NON CONTROL LUERLOCK TIP DISPOSABLE

## (undated) DEVICE — NEEDLE SYRINGE 18GA L1.5IN RED PLAS HUB S STL BLNT FILL W/O

## (undated) DEVICE — DISPOSABLE BIOPSY VALVE MAJ-1555: Brand: SINGLE USE BIOPSY VALVE (STERILE)

## (undated) DEVICE — SYRINGE MED 10ML LUERLOCK TIP W/O SFTY DISP

## (undated) DEVICE — ENDOSCOPIC KIT 1.1+ OP4 CA DE 2 GWN AAMI LEVEL 3

## (undated) DEVICE — CONNECTOR TBNG OD5-7MM O2 END DISP